# Patient Record
Sex: FEMALE | Race: WHITE | NOT HISPANIC OR LATINO | Employment: OTHER | URBAN - METROPOLITAN AREA
[De-identification: names, ages, dates, MRNs, and addresses within clinical notes are randomized per-mention and may not be internally consistent; named-entity substitution may affect disease eponyms.]

---

## 2017-01-24 ENCOUNTER — HOSPITAL ENCOUNTER (OUTPATIENT)
Dept: MAMMOGRAPHY | Facility: MEDICAL CENTER | Age: 52
Discharge: HOME | End: 2017-01-24
Payer: COMMERCIAL

## 2017-01-24 DIAGNOSIS — Z12.31 ENCOUNTER FOR SCREENING MAMMOGRAM FOR MALIGNANT NEOPLASM OF BREAST: ICD-10-CM

## 2017-01-24 PROCEDURE — 77063 BREAST TOMOSYNTHESIS BI: CPT

## 2017-01-24 PROCEDURE — G0202 SCR MAMMO BI INCL CAD: HCPCS

## 2017-01-25 ENCOUNTER — GENERIC CONVERSION - ENCOUNTER (OUTPATIENT)
Dept: OTHER | Facility: OTHER | Age: 52
End: 2017-01-25

## 2017-01-31 ENCOUNTER — GENERIC CONVERSION - ENCOUNTER (OUTPATIENT)
Dept: OTHER | Facility: OTHER | Age: 52
End: 2017-01-31

## 2017-03-09 ENCOUNTER — ALLSCRIPTS OFFICE VISIT (OUTPATIENT)
Dept: OTHER | Facility: OTHER | Age: 52
End: 2017-03-09

## 2017-04-12 ENCOUNTER — GENERIC CONVERSION - ENCOUNTER (OUTPATIENT)
Dept: OTHER | Facility: OTHER | Age: 52
End: 2017-04-12

## 2018-01-10 NOTE — PROGRESS NOTES
Assessment    1  Viral syndrome (079 99) (B34 9)    Plan  Viral syndrome    · Oseltamivir Phosphate 75 MG Oral Capsule (Oseltamivir Phosphate); TAKE 1  CAPSULE TWICE DAILY WITH MEALS    Discussion/Summary    Take meds as rxed, note for work  Chief Complaint    1  Cold Symptoms  PT C/O HEADACHE, FEVER, WEAKNESS, AND SORE THROAT  -sx started Monday- was sick, t max 102      Advance Directives  Advance Directive [facility]:   The patient is not in agreement to receive the Advance Care Planning service   Capacity/Competence: This patient has full decision making capacity for discussion of advance care planning and This patient has full decision making competency for discussion of advance care planning  Summary of Advance Directive Conversation  declines at this time  History of Present Illness  Cold Symptoms:   Kashif Tomlinson presents with complaints of sudden onset of constant episodes of moderate cold symptoms  Her symptoms are caused by home contact with URI  Symptoms are worsening  Review of Systems    Constitutional: fever, feeling poorly and chills  ENT: sore throat  Respiratory: cough  Gastrointestinal: no complaints of abdominal pain, no constipation, no nausea or diarrhea, no vomiting, no bloody stools  Neurological: headache  Active Problems    1  Abdominal pain (789 00) (R10 9)   2  Hashimoto's disease (245 2) (E06 3)   3  Visit for screening mammogram (V76 12) (Z12 31)    Past Medical History    1  History of hypothyroidism (V12 29) (Z86 39)  Active Problems And Past Medical History Reviewed: The active problems and past medical history were reviewed and updated today  Family History  Mother    1  Family history of kidney disease (V18 69) (Z84 1)  Father    2  Family history of cardiac disorder (V17 49) (Z82 49)   3  Family history of diabetes mellitus (V18 0) (Z83 3)   4  Family history of glaucoma (V19 11) (Z83 511)   5   Family history of hypothyroidism (V18 19) (Z83 49)  Sister    6  Family history of anemia (V18 2) (Z83 2)  Brother    7  Family history of hypothyroidism (V18 19) (Z83 49)  Family History Reviewed: The family history was reviewed and updated today  Social History    ·    · Never a smoker  The social history was reviewed and updated today  Surgical History    1  History of Oral Surgery Tooth Extraction  Surgical History Reviewed: The surgical history was reviewed and updated today  Current Meds   1  Levothroid 88 MCG TABS; Take 1 tablet daily Recorded    The medication list was reviewed and updated today  Allergies    1  Amoxicillin TABS   2  erythromycin    Vitals   Recorded: 02KQT0791 12:33PM   Temperature 98 5 F   Heart Rate 73   Systolic 188   Diastolic 78   Height 5 ft 6 in   Weight 176 lb 8 0 oz   BMI Calculated 28 49   BSA Calculated 1 9     Physical Exam    Constitutional   General appearance: Abnormal   acutely ill and overweight  Ears, Nose, Mouth, and Throat   Otoscopic examination: Tympanic membranes translucent with normal light reflex  Canals patent without erythema  Nasal mucosa, septum, and turbinates: Normal without edema or erythema  Oropharynx: Abnormal   There was erythema of both tonsils  Pulmonary   Auscultation of lungs: Clear to auscultation  Lymphatic   Palpation of lymph nodes in neck: No lymphadenopathy  Message  Return to work or school:   Mirtha Gallego is under my professional care  She was seen in my office on 3/9/17   She is able to return to work on  3/13/17      Oow 3/6-3/10 with leaving work early 3/6 and 3/9  Signatures   Electronically signed by :  Dilip Evans MD; Mar  9 2017 12:53PM EST                       (Author)

## 2018-01-10 NOTE — RESULT NOTES
Verified Results  MAMMO SCREENING BILATERAL W 3D & CAD [id number] 06:18PM [facility] Order Number: [id number]    - Patient Instructions: To schedule this appointment, please contact Central Scheduling at [phone]  Do not wear any perfume, powder, lotion or deodorant on breast or underarm area  Please bring your doctors order, referral (if needed) and insurance information with you on the day of the test  Failure to bring this information may result in this test being rescheduled  Arrive 15 minutes prior to your appointment time to register  On the day of your test, please bring any prior mammogram or breast studies with you that were not performed at a [facility]  Failure to bring prior exams may result in your test needing to be rescheduled  Test Name Result Flag Reference   MAMMO SCREENING BILATERAL W 3D & CAD (Report)     Patient History:   Patient is nulliparous  Family history of prostate cancer in paternal uncle at age 48 or    over  Patient has never smoked  Patient's BMI is 28 2  Reason for exam: screening (asymptomatic)  Mammo Screening Bilateral W DBT and CAD: January 24, 2017 - Check   In #: [de-identified]   2D/3D Procedure   3D views: Bilateral MLO view(s) were taken  2D views: Bilateral CC view(s) were taken  Technologist: DAVID Mendez (R)(M)   Prior study comparison: December 28, 2015, bilateral [facility] scr    bilat mammo w/DBT and CAD performed at [address] [place] [address]  December 19, 2014, bilateral digital    screening mammogram performed at [place] [address]  November 1, 2012, bilateral digital screening    mammogram performed at [place] [address]  September 29, 2011, bilateral digital screening    mammogram performed at [place] [address]  The breast tissue is heterogeneously dense, potentially limiting    the sensitivity of mammography  Patient risk, included in this    report, assists in determining the appropriate screening regimen    (such as 3-D mammography or the inclusion of automated breast    ultrasound or MRI)  3-D mammography may also remain indicated as    screening  No dominant soft tissue mass, architectural distortion or    suspicious calcifications are noted in either breast   The skin    and nipple structures are within normal limits  Scattered benign   appearing calcifications are noted  No significant changes when compared with prior studies  ASSESSMENT: BiRad:2 - Benign     Recommendation:   Routine screening mammogram of both breasts in 1 year  A    reminder letter will be scheduled  8-10% of cancers will be missed on mammography  Management of a    palpable abnormality must be based on clinical grounds  Patients    will be notified of their results via letter from our facility  Accredited by [employer] of Radiology and FDA       Transcription Location:  [address]orge [address]: [id number]     Risk Value(s):   Tyrer-Cuzick 10 Year: 3 731%, Tyrer-Cuzick Lifetime: 13 569%,    Myriad Table: 1 5%, ARTHUR 5 Year: 1 1%, NCI Lifetime: 8 6%

## 2018-01-10 NOTE — MISCELLANEOUS
Message  Return to work or school:   Zeina Segundo is under my professional care  She was seen in my office on 3/9/17   She is able to return to work on  3/13/17      Oow 3/6-3/10 with leaving work early 3/6 and 3/9  Advance Directives  Advance Directive [facility]:   The patient is not in agreement to receive the Advance Care Planning service   Capacity/Competence: This patient has full decision making capacity for discussion of advance care planning and This patient has full decision making competency for discussion of advance care planning  Summary of Advance Directive Conversation  declines at this time  Signatures   Electronically signed by :  Roly Ludwig MD; Mar  9 2017 12:53PM EST                       (Author)

## 2018-01-13 VITALS
BODY MASS INDEX: 28.37 KG/M2 | TEMPERATURE: 98.5 F | HEIGHT: 66 IN | SYSTOLIC BLOOD PRESSURE: 118 MMHG | WEIGHT: 176.5 LBS | DIASTOLIC BLOOD PRESSURE: 78 MMHG | HEART RATE: 73 BPM

## 2018-01-15 ENCOUNTER — TRANSCRIBE ORDERS (OUTPATIENT)
Dept: ADMINISTRATIVE | Facility: HOSPITAL | Age: 53
End: 2018-01-15

## 2018-01-15 DIAGNOSIS — Z12.31 VISIT FOR SCREENING MAMMOGRAM: Primary | ICD-10-CM

## 2018-01-16 DIAGNOSIS — Z12.31 ENCOUNTER FOR SCREENING MAMMOGRAM FOR MALIGNANT NEOPLASM OF BREAST: ICD-10-CM

## 2018-02-06 ENCOUNTER — HOSPITAL ENCOUNTER (OUTPATIENT)
Dept: MAMMOGRAPHY | Facility: MEDICAL CENTER | Age: 53
Discharge: HOME | End: 2018-02-06
Payer: COMMERCIAL

## 2018-02-06 DIAGNOSIS — Z12.31 ENCOUNTER FOR SCREENING MAMMOGRAM FOR MALIGNANT NEOPLASM OF BREAST: ICD-10-CM

## 2018-02-06 PROCEDURE — 77067 SCR MAMMO BI INCL CAD: CPT

## 2018-02-06 PROCEDURE — 77063 BREAST TOMOSYNTHESIS BI: CPT

## 2018-02-07 ENCOUNTER — TELEPHONE (OUTPATIENT)
Dept: FAMILY MEDICINE CLINIC | Facility: CLINIC | Age: 53
End: 2018-02-07

## 2018-02-27 ENCOUNTER — HOSPITAL ENCOUNTER (OUTPATIENT)
Dept: MAMMOGRAPHY | Facility: CLINIC | Age: 53
Discharge: HOME | End: 2018-02-27
Payer: COMMERCIAL

## 2018-02-27 DIAGNOSIS — R92.8 ABNORMAL SCREENING MAMMOGRAM: ICD-10-CM

## 2018-02-27 PROCEDURE — 77065 DX MAMMO INCL CAD UNI: CPT

## 2018-02-27 RX ORDER — MULTIVITAMIN
1 TABLET ORAL DAILY
COMMUNITY

## 2018-02-27 RX ORDER — LEVOTHYROXINE SODIUM 88 UG/1
1 TABLET ORAL DAILY
COMMUNITY

## 2018-02-27 NOTE — PROGRESS NOTES
Met with patient and Dr Petey Soni regarding recommendation for;      __x___ RIGHT ______LEFT      _____Ultrasound guided  ___x___Stereotactic  Breast biopsy  _____Verbalized understanding        Blood thinners:  _____yes __x___no    Date stopped: _____n/a______    Biopsy teaching sheet given:  ____x___yes ______no

## 2018-02-28 ENCOUNTER — TELEPHONE (OUTPATIENT)
Dept: FAMILY MEDICINE CLINIC | Facility: CLINIC | Age: 53
End: 2018-02-28

## 2018-02-28 NOTE — TELEPHONE ENCOUNTER
----- Message from Valery Peralta MD sent at 2/27/2018  5:48 PM EST -----  Based on mammo they are recommending further testing-is pt scheduled for testing they recommended and is she okay with going for further testing?

## 2018-03-01 ENCOUNTER — HOSPITAL ENCOUNTER (OUTPATIENT)
Dept: MAMMOGRAPHY | Facility: CLINIC | Age: 53
Discharge: HOME | End: 2018-03-01
Payer: COMMERCIAL

## 2018-03-01 VITALS — HEART RATE: 88 BPM | DIASTOLIC BLOOD PRESSURE: 82 MMHG | SYSTOLIC BLOOD PRESSURE: 108 MMHG

## 2018-03-01 DIAGNOSIS — R92.0 MAMMOGRAPHIC MICROCALCIFICATION: ICD-10-CM

## 2018-03-01 DIAGNOSIS — R92.8 OTHER ABNORMAL AND INCONCLUSIVE FINDINGS ON DIAGNOSTIC IMAGING OF BREAST: ICD-10-CM

## 2018-03-01 PROCEDURE — 88305 TISSUE EXAM BY PATHOLOGIST: CPT | Performed by: PATHOLOGY

## 2018-03-01 PROCEDURE — 88342 IMHCHEM/IMCYTCHM 1ST ANTB: CPT | Performed by: RADIOLOGY

## 2018-03-01 PROCEDURE — 88361 TUMOR IMMUNOHISTOCHEM/COMPUT: CPT | Performed by: PATHOLOGY

## 2018-03-01 PROCEDURE — 88361 TUMOR IMMUNOHISTOCHEM/COMPUT: CPT | Performed by: RADIOLOGY

## 2018-03-01 PROCEDURE — 88341 IMHCHEM/IMCYTCHM EA ADD ANTB: CPT | Performed by: RADIOLOGY

## 2018-03-01 PROCEDURE — 88342 IMHCHEM/IMCYTCHM 1ST ANTB: CPT | Performed by: PATHOLOGY

## 2018-03-01 PROCEDURE — 88305 TISSUE EXAM BY PATHOLOGIST: CPT | Performed by: RADIOLOGY

## 2018-03-01 PROCEDURE — 19081 BX BREAST 1ST LESION STRTCTC: CPT

## 2018-03-01 PROCEDURE — 88341 IMHCHEM/IMCYTCHM EA ADD ANTB: CPT | Performed by: PATHOLOGY

## 2018-03-01 RX ORDER — SODIUM BICARBONATE 42 MG/ML
1 INJECTION, SOLUTION INTRAVENOUS ONCE
Status: COMPLETED | OUTPATIENT
Start: 2018-03-01 | End: 2018-03-01

## 2018-03-01 RX ORDER — LIDOCAINE HYDROCHLORIDE 10 MG/ML
4 INJECTION, SOLUTION INFILTRATION; PERINEURAL ONCE
Status: COMPLETED | OUTPATIENT
Start: 2018-03-01 | End: 2018-03-01

## 2018-03-01 RX ORDER — LIDOCAINE HYDROCHLORIDE AND EPINEPHRINE BITARTRATE 20; .01 MG/ML; MG/ML
9 INJECTION, SOLUTION SUBCUTANEOUS ONCE
Status: COMPLETED | OUTPATIENT
Start: 2018-03-01 | End: 2018-03-01

## 2018-03-01 RX ADMIN — LIDOCAINE HYDROCHLORIDE AND EPINEPHRINE 9 ML: 20; 10 INJECTION, SOLUTION INFILTRATION; PERINEURAL at 12:44

## 2018-03-01 RX ADMIN — LIDOCAINE HYDROCHLORIDE 4 ML: 10 INJECTION, SOLUTION INFILTRATION; PERINEURAL at 12:44

## 2018-03-01 RX ADMIN — SODIUM BICARBONATE 0.5 MEQ: 42 SOLUTION INTRAVENOUS at 12:44

## 2018-03-01 NOTE — PROGRESS NOTES
Procedure type:    _____ultrasound guided __X___stereotactic    Breast:    _____Left __X___Right    Location:     Needle: 9G Standard Eviva    # of passes: A - 4 cores with calcs            B - remaining no calcs    Clip: S mey [place] 2S 13-Tophat    Performed by: Dr Birgit Quiroz held for 5 minutes by: Keshia Hess RN    Stergregory Strips:    __X___yes _____no    Alexandre Ryan aid:    __X___yes_____no    Tape and guaze:    __X___yes _____no    Tolerated procedure:    __X___yes _____no

## 2018-03-01 NOTE — PROGRESS NOTES
Ice pack given:    __X___yes _____no    Discharge instructions signed by patient:    ___X__yes _____no    Discharge instructions given to patient:    ___X__yes _____no    Discharged via:    __X___amulatory    _____wheelchair    _____stretcher    Stable on discharge:    __X___yes ____no

## 2018-03-01 NOTE — DISCHARGE INSTR - OTHER ORDERS
POST LARGE CORE BREAST BIOPSY PATIENT INFORMATION      1  Place an ice pack inside your bra over the top of the dressing every hour for 20 minutes (20 minutes on, 60 minutes off) Do this until bedtime  2  Do not shower or bathe until the following morning       3  You may bathe your breast carefully with the steri-strips in place  Be careful    Not to loosen them  The steri-strips will fall off in 3-5 days  4  You may have mild discomfort, and you may have some bruising where the   Needle entered the skin  This should clear within 5-7 days  5  If you need medicine for discomfort, take acetaminophen products such as   Tylenol  You may also take Advil or Motrin products  6  Do not participate in strenuous activities such as-tennis, aerobics, skiing,  Weight lifting, etc  for 24 hours  Refrain from swimming for 72 hours  7  Wearing a bra for sleeping may be more comfortable for the first 24-48 hours  8  Watch for continued bleeding, pain or fever over 101     For procedures done at the [address] [place] [address] call:  Shani Bland RN at [phone] or  Eren Santillan RN at [phone]  After 4 PM call the Interventional Radiology Department at [phone] and ask to speak with the nurse on call  For procedures performed at [address] [place] [address] call: The Radiology Nurse at [phone]    After 4 PM call your physician, or go to the Emergency Department  9          The final results of your biopsy are usually available within one week

## 2018-03-02 NOTE — PROGRESS NOTES
Post procedure call completed on 3/2/18 at 1125    Bleeding: _____yes __x___no    Pain: _____yes ___x___no    Redness/Swelling: ______yes __x____no    Band aid removed: ___x__yes _____no    Steri-Strips intact: ____x__yes _____no

## 2018-03-05 ENCOUNTER — TELEPHONE (OUTPATIENT)
Dept: FAMILY MEDICINE CLINIC | Facility: CLINIC | Age: 53
End: 2018-03-05

## 2018-03-05 DIAGNOSIS — D05.11 DUCTAL CARCINOMA IN SITU (DCIS) OF RIGHT BREAST: Primary | ICD-10-CM

## 2018-03-06 NOTE — PROGRESS NOTES
Patient Past Medical History:  Thyroid dysfunction          Allergies:Bactrim, Amoxicillin, E-Mycin, Clindamycin        Past Breast History:     Previous Biopsy:   Yes______ No____x____      Breast: Right____ Left______       Malignant______ Benign_______      Treatments if applicable        Present Breast History:     Right_____x_____    Left_____________     Density_________    Calcifications______x______   Palpable______________      Patient notified of results on:  3/5/18    Date of Appointment with Surgeon Dr Kimberly Loving, appt   3/12/18

## 2018-03-08 ENCOUNTER — TELEPHONE (OUTPATIENT)
Dept: FAMILY MEDICINE CLINIC | Facility: CLINIC | Age: 53
End: 2018-03-08

## 2018-03-08 NOTE — TELEPHONE ENCOUNTER
Patient left voice mail requesting her biopsy results shea had done on  3/1/2018  Patient also requested her Pathology report to be mailed to her house  Report will be mailed out tomorrow

## 2018-03-08 NOTE — TELEPHONE ENCOUNTER
I spoke with pt yesterday-did she have more questions after that, please mail results to her so she has for appt with surgeon Dr Bryson Serra next week

## 2018-03-14 ENCOUNTER — DOCUMENTATION (OUTPATIENT)
Dept: HEMATOLOGY ONCOLOGY | Facility: CLINIC | Age: 53
End: 2018-03-14

## 2018-03-14 ENCOUNTER — OFFICE VISIT (OUTPATIENT)
Dept: SURGICAL ONCOLOGY | Facility: CLINIC | Age: 53
End: 2018-03-14
Payer: COMMERCIAL

## 2018-03-14 VITALS
DIASTOLIC BLOOD PRESSURE: 82 MMHG | BODY MASS INDEX: 28.38 KG/M2 | HEIGHT: 66 IN | SYSTOLIC BLOOD PRESSURE: 134 MMHG | RESPIRATION RATE: 16 BRPM | WEIGHT: 176.6 LBS | HEART RATE: 72 BPM

## 2018-03-14 DIAGNOSIS — R92.2 DENSE BREAST TISSUE: ICD-10-CM

## 2018-03-14 DIAGNOSIS — D05.11 DUCTAL CARCINOMA IN SITU (DCIS) OF RIGHT BREAST WITH MICROINVASIVE COMPONENT: Primary | ICD-10-CM

## 2018-03-14 PROBLEM — C50.919: Status: ACTIVE | Noted: 2018-03-14

## 2018-03-14 PROBLEM — R92.30 DENSE BREAST TISSUE: Status: ACTIVE | Noted: 2018-03-14

## 2018-03-14 PROBLEM — D05.10: Status: ACTIVE | Noted: 2018-03-14

## 2018-03-14 PROCEDURE — 99244 OFF/OP CNSLTJ NEW/EST MOD 40: CPT | Performed by: SURGERY

## 2018-03-14 NOTE — PROGRESS NOTES
Oncology Navigator Visit  Assessment:  Called and left voice message on pt's cell phone for purpose of initial RN navigation outreach  Left contact info and instructions to call office at best convenience  Will outreach PRN        Potential Barriers:    Care Coordination:      Communication/Education:    Cultural/Faith/Spiritual:    Health Promotion:    Insurance/Medical Costs:    Logistical:    Psychosocial/Distress/Behavioral:    Work/School:    Interventions:    Referrals:        Comments:

## 2018-03-14 NOTE — LETTER
2018     MD [address]  [id number]    Patient: Ana Archuleta   YOB: 1965   Date of Visit: 3/14/2018       Dear Dr Gerald Townsend:    Thank you for referring Erika Flynn to me for evaluation  Below are my notes for this consultation  If you have questions, please do not hesitate to call me  I look forward to following your patient along with you  Sincerely,        Geneva Schneider MD        CC: No Recipients  Geneva Schneider MD  3/14/2018 11:32 AM  Sign at close encounter    Breast Consultation-Surgical Oncology     [place] [address]    Name:  Ana Archuleta  YOB: 1965  MRN:  [id number]    Assessment/Plan   Diagnoses and all orders for this visit:    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  -     MRI breast bilateral w or wo contrast; Future    Dense breast tissue  -     MRI breast bilateral w or wo contrast; Future        Assessment:  Right breast carcinoma, microinvasion in a background of extensive ductal carcinoma in situ; dense breast tissue and additional calcifications in the right breast    Plan:  MRI of the bilateral breast to define extent of disease, surgical recommendations will be made following this    HPI: Ana Archuleta is a 45y o  year old female who presents with a newly diagnosed right breast cancer  Pt denies any breast lumps, nipple drainage or skin changes  Right breast microcalcifications seen on breast imaging  She had right stereotactic breast biopsy done on 18 which revealed DCIS with microinvasion  Pt's biopsy site is dry, intact and healing  Surgical treatment to date consisted of n/a              Pertinent reproductive history:  Age at menarche:  13  OB History      Para Term  AB Living    0 0 0 0 0 0    SAB TAB Ectopic Multiple Live Births    0 0 0 0 0        Obstetric Comments    Age at menarche 13  Hx use of BCP X 2 years        Age at first live birth:  Never pregnant  Age at menopause:  unk  Hysterectomy/Oophrectomy:  No  Hormone replacement therapy:  No  Birth control pills:  Yes    Problem List:   Patient Active Problem List   Diagnosis    Ductal carcinoma in situ (DCIS) of breast with microinvasive component    Dense breast tissue     Past Medical History:   Diagnosis Date    Bilateral breast cysts     Breast tenderness     Frequent urination     Night sweats      Past Surgical History:   Procedure Laterality Date    BREAST BIOPSY Right 03 01/18    DCIS    THYROID SURGERY       Family History   Problem Relation Age of Onset    Prostate cancer Paternal Uncle 64     Social History     Social History    Marital status: /Civil Union     Spouse name: N/A    Number of children: N/A    Years of education: N/A     Occupational History    Not on file  Social History Main Topics    Smoking status: Never Smoker    Smokeless tobacco: Never Used    Alcohol use Yes      Comment: 8 drinks per week    Drug use: Unknown    Sexual activity: Not on file     Other Topics Concern    Not on file     Social History Narrative    No narrative on file     Current Outpatient Prescriptions   Medication Sig Dispense Refill    levothyroxine 88 mcg tablet Take 1 tablet by mouth daily      Multiple Vitamin (MULTIVITAMIN) tablet Take 1 tablet by mouth daily       No current facility-administered medications for this visit        Allergies   Allergen Reactions    Clindamycin Other (See Comments)     Was painful to walk     Erythromycin Hives    Sulfamethoxazole-Trimethoprim     Amoxicillin Rash         The following portions of the patient's history were reviewed and updated as appropriate: allergies, current medications, past family history, past medical history, past social history, past surgical history and problem list     Review of Systems:  Review of Systems Constitutional: Negative for appetite change and fever  Hot flashes   HENT: Positive for tinnitus  Eyes: Negative  Respiratory: Negative for shortness of breath  Cardiovascular: Negative  Gastrointestinal: Negative  Endocrine: Negative  Genitourinary: Positive for frequency  Musculoskeletal: Negative  Negative for arthralgias and myalgias  Skin: Negative  Allergic/Immunologic: Negative  Neurological: Negative  Hematological: Negative  Negative for adenopathy  Does not bruise/bleed easily  Psychiatric/Behavioral: Negative  Physical Exam:  Physical Exam   Constitutional: She is oriented to person, place, and time  She appears well-developed and well-nourished  HENT:   Head: Normocephalic and atraumatic  Cardiovascular: Normal heart sounds  Pulmonary/Chest: Breath sounds normal  Right breast exhibits skin change (well healed biopsy site upper outer right breast)  Right breast exhibits no inverted nipple, no mass, no nipple discharge and no tenderness  Left breast exhibits no inverted nipple, no mass, no nipple discharge, no skin change and no tenderness  Abdominal: Soft  Lymphadenopathy:        Right axillary: No pectoral and no lateral adenopathy present  Left axillary: No pectoral and no lateral adenopathy present  Right: No supraclavicular adenopathy present  Left: No supraclavicular adenopathy present  Neurological: She is alert and oriented to person, place, and time  Psychiatric: She has a normal mood and affect  Laboratory:  2018 stereotactic biopsy of the right breast microinvasive carcinoma in a background of extensive ductal carcinoma in situ    Tumor node status:  Clinically node-negative     Hormone receptor status:  estrogen receptor negative, ? On invasive component vs DCIS      Imagin18  3D Bilateral screening mammogram,  B0 (3)   Right breast calcifications; left benign  18 right dx mammogram, B4 (3)  Suspicious calcs for which biopsy was recommended  03/01/18 right stereotactic breast biopsy as noted        Treatment options include:  Lumpectomy vs mastectomy    Discussion/Summary:  61-year-old female with newly diagnosed carcinoma of the right breast   This is a micro invasive carcinoma in in a background of extensive ductal carcinoma in situ  She does have additional calcifications in the right breast that have remained stable  She also has dense glandular tissue  For this reason, a breast MRI has been recommended by the radiologist and I concur with this recommendation  I did discuss with her the multimodality treatment of breast cancer to include surgery, radiation and medical therapy  If there are no additional areas of concern seen on the breast MRI, then she would be a good candidate for breast conservation  If any of the other calcifications would be of concern, then she would need an additional biopsy  Irregardless of the type of surgery, she will need a sentinel node biopsy  She understands if she has breast conservation that she will need radiation therapy as well  Regarding the medical therapy, I discussed her receptor status with her  One of the questions I have is whether or not this was done on the invasive component versus the in situ component  The invasive component is noted to be 0 8 mm on the current biopsy report  Even if she is indeed ER WV negative and her2 positive on the invasive component, with a tumor this small she would not likely benefit from any adjuvant medical therapy  I discussed the possibility of repeating this on her final surgical pathology  All of her questions were answered today  I will make arrangements for her breast MRI  I will call her following this with the results and to make any further recommendations

## 2018-03-14 NOTE — PROGRESS NOTES
Breast Consultation-Surgical Oncology     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  -     MRI breast bilateral w or wo contrast; Future    Dense breast tissue  -     MRI breast bilateral w or wo contrast; Future        Assessment:  Right breast carcinoma, microinvasion in a background of extensive ductal carcinoma in situ; dense breast tissue and additional calcifications in the right breast    Plan:  MRI of the bilateral breast to define extent of disease, surgical recommendations will be made following this    HPI: Александр Ash is a 45y o  year old female who presents with a newly diagnosed right breast cancer  Pt denies any breast lumps, nipple drainage or skin changes  Right breast microcalcifications seen on breast imaging  She had right stereotactic breast biopsy done on 18 which revealed DCIS with microinvasion  Pt's biopsy site is dry, intact and healing  Surgical treatment to date consisted of n/a  Pertinent reproductive history:  Age at menarche:  13  OB History      Para Term  AB Living    0 0 0 0 0 0    SAB TAB Ectopic Multiple Live Births    0 0 0 0 0        Obstetric Comments    Age at menarche 13   Hx use of BCP X 2 years        Age at first live birth:  Never pregnant  Age at menopause:  unk  Hysterectomy/Oophrectomy:  No  Hormone replacement therapy:  No  Birth control pills:  Yes    Problem List:   Patient Active Problem List   Diagnosis    Ductal carcinoma in situ (DCIS) of breast with microinvasive component    Dense breast tissue     Past Medical History:   Diagnosis Date    Bilateral breast cysts     Breast tenderness     Frequent urination     Night sweats      Past Surgical History:   Procedure Laterality Date    BREAST BIOPSY Right 03 01/18    DCIS    THYROID SURGERY       Family History   Problem Relation Age of Onset    Prostate cancer Paternal Uncle 64     Social History     Social History    Marital status: /Civil Union     Spouse name: N/A    Number of children: N/A    Years of education: N/A     Occupational History    Not on file  Social History Main Topics    Smoking status: Never Smoker    Smokeless tobacco: Never Used    Alcohol use Yes      Comment: 8 drinks per week    Drug use: Unknown    Sexual activity: Not on file     Other Topics Concern    Not on file     Social History Narrative    No narrative on file     Current Outpatient Prescriptions   Medication Sig Dispense Refill    levothyroxine 88 mcg tablet Take 1 tablet by mouth daily      Multiple Vitamin (MULTIVITAMIN) tablet Take 1 tablet by mouth daily       No current facility-administered medications for this visit  Allergies   Allergen Reactions    Clindamycin Other (See Comments)     Was painful to walk     Erythromycin Hives    Sulfamethoxazole-Trimethoprim     Amoxicillin Rash         The following portions of the patient's history were reviewed and updated as appropriate: allergies, current medications, past family history, past medical history, past social history, past surgical history and problem list     Review of Systems:  Review of Systems   Constitutional: Negative for appetite change and fever  Hot flashes   HENT: Positive for tinnitus  Eyes: Negative  Respiratory: Negative for shortness of breath  Cardiovascular: Negative  Gastrointestinal: Negative  Endocrine: Negative  Genitourinary: Positive for frequency  Musculoskeletal: Negative  Negative for arthralgias and myalgias  Skin: Negative  Allergic/Immunologic: Negative  Neurological: Negative  Hematological: Negative  Negative for adenopathy  Does not bruise/bleed easily  Psychiatric/Behavioral: Negative  Physical Exam:  Physical Exam   Constitutional: She is oriented to person, place, and time  She appears well-developed and well-nourished  HENT:   Head: Normocephalic and atraumatic  Cardiovascular: Normal heart sounds  Pulmonary/Chest: Breath sounds normal  Right breast exhibits skin change (well healed biopsy site upper outer right breast)  Right breast exhibits no inverted nipple, no mass, no nipple discharge and no tenderness  Left breast exhibits no inverted nipple, no mass, no nipple discharge, no skin change and no tenderness  Abdominal: Soft  Lymphadenopathy:        Right axillary: No pectoral and no lateral adenopathy present  Left axillary: No pectoral and no lateral adenopathy present  Right: No supraclavicular adenopathy present  Left: No supraclavicular adenopathy present  Neurological: She is alert and oriented to person, place, and time  Psychiatric: She has a normal mood and affect  Laboratory:  2018 stereotactic biopsy of the right breast microinvasive carcinoma in a background of extensive ductal carcinoma in situ    Tumor node status:  Clinically node-negative     Hormone receptor status:  estrogen receptor negative, ? On invasive component vs DCIS      Imagin18  3D Bilateral screening mammogram,  B0 (3)  Right breast calcifications; left benign  18 right dx mammogram, B4 (3)  Suspicious calcs for which biopsy was recommended  18 right stereotactic breast biopsy as noted        Treatment options include:  Lumpectomy vs mastectomy    Discussion/Summary:  77-year-old female with newly diagnosed carcinoma of the right breast   This is a micro invasive carcinoma in in a background of extensive ductal carcinoma in situ  She does have additional calcifications in the right breast that have remained stable  She also has dense glandular tissue   For this reason, a breast MRI has been recommended by the radiologist and I concur with this recommendation  I did discuss with her the multimodality treatment of breast cancer to include surgery, radiation and medical therapy  If there are no additional areas of concern seen on the breast MRI, then she would be a good candidate for breast conservation  If any of the other calcifications would be of concern, then she would need an additional biopsy  Irregardless of the type of surgery, she will need a sentinel node biopsy  She understands if she has breast conservation that she will need radiation therapy as well  Regarding the medical therapy, I discussed her receptor status with her  One of the questions I have is whether or not this was done on the invasive component versus the in situ component  The invasive component is noted to be 0 8 mm on the current biopsy report  Even if she is indeed ER CT negative and her2 positive on the invasive component, with a tumor this small she would not likely benefit from any adjuvant medical therapy  I discussed the possibility of repeating this on her final surgical pathology  All of her questions were answered today  I will make arrangements for her breast MRI  I will call her following this with the results and to make any further recommendations

## 2018-03-20 ENCOUNTER — HOSPITAL ENCOUNTER (OUTPATIENT)
Dept: RADIOLOGY | Facility: HOSPITAL | Age: 53
Discharge: HOME | End: 2018-03-20
Attending: SURGERY
Payer: COMMERCIAL

## 2018-03-20 DIAGNOSIS — R92.2 DENSE BREAST TISSUE: ICD-10-CM

## 2018-03-20 DIAGNOSIS — D05.11 DUCTAL CARCINOMA IN SITU (DCIS) OF RIGHT BREAST WITH MICROINVASIVE COMPONENT: ICD-10-CM

## 2018-03-20 PROCEDURE — C8908 MRI W/O FOL W/CONT, BREAST,: HCPCS

## 2018-03-20 PROCEDURE — A9585 GADOBUTROL INJECTION: HCPCS | Performed by: SURGERY

## 2018-03-20 RX ADMIN — GADOBUTROL 6 ML: 604.72 INJECTION INTRAVENOUS at 13:20

## 2018-03-21 DIAGNOSIS — R92.8 ABNORMAL MRI, BREAST: Primary | ICD-10-CM

## 2018-03-23 ENCOUNTER — TELEPHONE (OUTPATIENT)
Dept: SURGICAL ONCOLOGY | Facility: CLINIC | Age: 53
End: 2018-03-23

## 2018-03-23 NOTE — TELEPHONE ENCOUNTER
As per Dr Maegan Sanchez spoke with pt and informed her of breast MRI results and the recommendation for a left breast second look Us  Pt verbalized understanding and is scheduled for Tuesday, 03/27/18  Will proceed with biopsy if warranted  Pt informed of refraining from blood thinning medications

## 2018-03-27 ENCOUNTER — HOSPITAL ENCOUNTER (OUTPATIENT)
Dept: ULTRASOUND IMAGING | Facility: CLINIC | Age: 53
Discharge: HOME | End: 2018-03-27
Payer: COMMERCIAL

## 2018-03-27 ENCOUNTER — HOSPITAL ENCOUNTER (OUTPATIENT)
Dept: ULTRASOUND IMAGING | Facility: CLINIC | Age: 53
Discharge: HOME | End: 2018-03-27

## 2018-03-27 DIAGNOSIS — R92.8 ABNORMAL MRI, BREAST: ICD-10-CM

## 2018-03-27 DIAGNOSIS — R93.89 ABNORMAL MRI: Primary | ICD-10-CM

## 2018-03-27 PROCEDURE — 76642 ULTRASOUND BREAST LIMITED: CPT

## 2018-03-27 RX ORDER — LIDOCAINE HYDROCHLORIDE 10 MG/ML
4 INJECTION, SOLUTION INFILTRATION; PERINEURAL ONCE
Status: DISCONTINUED | OUTPATIENT
Start: 2018-03-27 | End: 2018-03-28 | Stop reason: HOSPADM

## 2018-03-27 RX ORDER — SODIUM BICARBONATE 42 MG/ML
1 INJECTION, SOLUTION INTRAVENOUS ONCE
Status: DISCONTINUED | OUTPATIENT
Start: 2018-03-27 | End: 2018-03-28 | Stop reason: HOSPADM

## 2018-04-04 ENCOUNTER — OFFICE VISIT (OUTPATIENT)
Dept: SURGICAL ONCOLOGY | Facility: CLINIC | Age: 53
End: 2018-04-04
Payer: COMMERCIAL

## 2018-04-04 VITALS
HEIGHT: 66 IN | WEIGHT: 175 LBS | TEMPERATURE: 98.2 F | SYSTOLIC BLOOD PRESSURE: 90 MMHG | BODY MASS INDEX: 28.12 KG/M2 | HEART RATE: 72 BPM | DIASTOLIC BLOOD PRESSURE: 80 MMHG | RESPIRATION RATE: 16 BRPM

## 2018-04-04 DIAGNOSIS — D05.11 DUCTAL CARCINOMA IN SITU (DCIS) OF RIGHT BREAST WITH MICROINVASIVE COMPONENT: Primary | ICD-10-CM

## 2018-04-04 PROBLEM — C50.911 DUCTAL CARCINOMA IN SITU (DCIS) OF RIGHT BREAST WITH MICROINVASIVE COMPONENT (HCC): Status: ACTIVE | Noted: 2018-04-04

## 2018-04-04 PROCEDURE — 99215 OFFICE O/P EST HI 40 MIN: CPT | Performed by: SURGERY

## 2018-04-04 RX ORDER — TRAMADOL HYDROCHLORIDE 50 MG/1
50 TABLET ORAL EVERY 6 HOURS PRN
Qty: 20 TABLET | Refills: 0 | Status: SHIPPED | OUTPATIENT
Start: 2018-04-04 | End: 2018-08-15

## 2018-04-04 NOTE — PROGRESS NOTES
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  -     traMADol (ULTRAM) 50 mg tablet; Take 1 tablet (50 mg total) by mouth every 6 (six) hours as needed for moderate pain  -     Mammo needle localization right (all inc); Future  -     Mammo needle localization right (all inc) each add; Future  -     NM lymphatic breast; Future        Advance Care Planning/Advance Directives:  Did not discuss  with the patient  Oncology History:     No history exists  History of Present Illness: multifocal dcis right breast with microinvasion  -Interval History:none    Review of Systems:  Review of Systems   Constitutional: Negative  Negative for appetite change and fever  Eyes: Negative  Respiratory: Negative for shortness of breath  Cardiovascular: Negative  Gastrointestinal: Negative  Endocrine: Negative  Genitourinary: Negative  Musculoskeletal: Negative  Negative for arthralgias and myalgias  Skin: Negative  Allergic/Immunologic: Negative  Neurological: Negative  Hematological: Negative  Negative for adenopathy  Does not bruise/bleed easily  Psychiatric/Behavioral: Negative          Patient Active Problem List   Diagnosis    Ductal carcinoma in situ (DCIS) of breast with microinvasive component    Dense breast tissue     Past Medical History:   Diagnosis Date    Bilateral breast cysts     Breast tenderness     Frequent urination     Night sweats      Past Surgical History:   Procedure Laterality Date  BREAST BIOPSY Right 03 01/18    DCIS    THYROID SURGERY       Family History   Problem Relation Age of Onset    Prostate cancer Paternal Uncle 64     Social History     Social History    Marital status: /Civil Union     Spouse name: N/A    Number of children: N/A    Years of education: N/A     Occupational History    Not on file  Social History Main Topics    Smoking status: Never Smoker    Smokeless tobacco: Never Used    Alcohol use Yes      Comment: 8 drinks per week    Drug use: Unknown    Sexual activity: Not on file     Other Topics Concern    Not on file     Social History Narrative    No narrative on file       Current Outpatient Prescriptions:     levothyroxine 88 mcg tablet, Take 1 tablet by mouth daily, Disp: , Rfl:     Multiple Vitamin (MULTIVITAMIN) tablet, Take 1 tablet by mouth daily, Disp: , Rfl:     traMADol (ULTRAM) 50 mg tablet, Take 1 tablet (50 mg total) by mouth every 6 (six) hours as needed for moderate pain, Disp: 20 tablet, Rfl: 0  Allergies   Allergen Reactions    Clindamycin Other (See Comments)     Was painful to walk     Erythromycin Hives    Sulfamethoxazole-Trimethoprim     Amoxicillin Rash       The following portions of the patient's history were reviewed and updated as appropriate: allergies, current medications, past family history, past medical history, past social history, past surgical history and problem list         Vitals:    04/04/18 0820   BP: 90/80   Pulse: 72   Resp: 16   Temp: 98 2 °F (36 8 °C)       Physical Exam   Constitutional: She is oriented to person, place, and time  She appears well-developed and well-nourished  Cardiovascular: Normal heart sounds  Pulmonary/Chest: Breath sounds normal    Neurological: She is alert and oriented to person, place, and time  Psychiatric: She has a normal mood and affect           Results:      Imaging  3/20/18 bilateral breast MRI multifocal DCIS right breast, area of enhancement on the left for which a second-look ultrasound was recommended  03/27/2018 left breast ultrasound no correlate identified for the MRI finding therefore an MRI guided biopsy is recommended    I reviewed the above imaging data  Discussion/Summary:  57-year-old female with multifocal ductal carcinoma in situ of the right breast with an area of microinvasion  This appears to involve the entire radian of the breast   I discussed this with her and recommended either a bracketed lumpectomy versus a mastectomy  If she chooses the former, she will have asymmetry to the breast   She will also need radiation therapy  If the latter, she has the option of doing reconstruction at the same time of the mastectomy  She does prefers to save her breast   I therefore discussed a bracketed needle localized lumpectomy of the right breast along with sentinel node biopsy  As stated she will need radiation therapy  She will also meet with Medical Oncology in the postoperative setting to discuss any adjuvant therapy  If she has additional invasive foci, I will repeat her receptor status  She has an upcoming MRI guided biopsy of the contralateral breast scheduled for 4/16  I will plan for surgery on 04/24 to allow time for those results  If the MRI guided biopsy is benign, we will proceed as stated above with a needle localized bracketed right lumpectomy with lymphatic mapping and sentinel node biopsy  All of her questions were answered today and consent was signed in the office

## 2018-04-11 ENCOUNTER — ANESTHESIA EVENT (OUTPATIENT)
Dept: PERIOP | Facility: HOSPITAL | Age: 53
End: 2018-04-11
Payer: COMMERCIAL

## 2018-04-11 RX ORDER — SODIUM CHLORIDE 9 MG/ML
125 INJECTION, SOLUTION INTRAVENOUS CONTINUOUS
Status: CANCELLED | OUTPATIENT
Start: 2018-04-24

## 2018-04-12 ENCOUNTER — HOSPITAL ENCOUNTER (OUTPATIENT)
Dept: NON INVASIVE DIAGNOSTICS | Facility: HOSPITAL | Age: 53
Discharge: HOME | End: 2018-04-12
Attending: SURGERY
Payer: COMMERCIAL

## 2018-04-12 ENCOUNTER — HOSPITAL ENCOUNTER (OUTPATIENT)
Dept: RADIOLOGY | Facility: HOSPITAL | Age: 53
Discharge: HOME | End: 2018-04-12
Attending: SURGERY
Payer: COMMERCIAL

## 2018-04-12 ENCOUNTER — APPOINTMENT (OUTPATIENT)
Dept: LAB | Facility: HOSPITAL | Age: 53
End: 2018-04-12
Attending: SURGERY
Payer: COMMERCIAL

## 2018-04-12 ENCOUNTER — APPOINTMENT (OUTPATIENT)
Dept: PREADMISSION TESTING | Facility: HOSPITAL | Age: 53
End: 2018-04-12
Payer: COMMERCIAL

## 2018-04-12 DIAGNOSIS — D05.11 DUCTAL CARCINOMA IN SITU (DCIS) OF RIGHT BREAST WITH MICROINVASIVE COMPONENT: ICD-10-CM

## 2018-04-12 LAB
ALBUMIN SERPL BCP-MCNC: 3.7 G/DL (ref 3.5–5)
ALP SERPL-CCNC: 73 U/L (ref 46–116)
ALT SERPL W P-5'-P-CCNC: 38 U/L (ref 12–78)
ANION GAP SERPL CALCULATED.3IONS-SCNC: 8 MMOL/L (ref 4–13)
APTT PPP: 28 SECONDS (ref 23–35)
AST SERPL W P-5'-P-CCNC: 24 U/L (ref 5–45)
ATRIAL RATE: 72 BPM
BASOPHILS # BLD AUTO: 0.05 THOUSANDS/ΜL (ref 0–0.1)
BASOPHILS NFR BLD AUTO: 1 % (ref 0–1)
BILIRUB SERPL-MCNC: 0.41 MG/DL (ref 0.2–1)
BILIRUB UR QL STRIP: NEGATIVE
BUN SERPL-MCNC: 18 MG/DL (ref 5–25)
CALCIUM SERPL-MCNC: 9.4 MG/DL
CHLORIDE SERPL-SCNC: 104 MMOL/L (ref 100–108)
CLARITY UR: CLEAR
CO2 SERPL-SCNC: 28 MMOL/L (ref 21–32)
COLOR UR: YELLOW
CREAT SERPL-MCNC: 0.76 MG/DL (ref 0.6–1.3)
EOSINOPHIL # BLD AUTO: 0.17 THOUSAND/ΜL (ref 0–0.61)
EOSINOPHIL NFR BLD AUTO: 2 % (ref 0–6)
ERYTHROCYTE [DISTWIDTH] IN BLOOD BY AUTOMATED COUNT: 12.5 % (ref 11.6–15.1)
GFR SERPL CREATININE-BSD FRML MDRD: 89 ML/MIN/1.73SQ M
GLUCOSE SERPL-MCNC: 91 MG/DL (ref 65–140)
GLUCOSE UR STRIP-MCNC: NEGATIVE MG/DL
HCT VFR BLD AUTO: 41.7 % (ref 34.8–46.1)
HGB BLD-MCNC: 13.8 G/DL (ref 11.5–15.4)
HGB UR QL STRIP.AUTO: NEGATIVE
INR PPP: 0.96 (ref 0.86–1.16)
KETONES UR STRIP-MCNC: NEGATIVE MG/DL
LEUKOCYTE ESTERASE UR QL STRIP: NEGATIVE
LYMPHOCYTES # BLD AUTO: 2.18 THOUSANDS/ΜL (ref 0.6–4.47)
LYMPHOCYTES NFR BLD AUTO: 30 % (ref 14–44)
MCH RBC QN AUTO: 29.7 PG (ref 26.8–34.3)
MCHC RBC AUTO-ENTMCNC: 33.1 G/DL (ref 31.4–37.4)
MCV RBC AUTO: 90 FL (ref 82–98)
MONOCYTES # BLD AUTO: 0.56 THOUSAND/ΜL (ref 0.17–1.22)
MONOCYTES NFR BLD AUTO: 8 % (ref 4–12)
NEUTROPHILS # BLD AUTO: 4.38 THOUSANDS/ΜL (ref 1.85–7.62)
NEUTS SEG NFR BLD AUTO: 59 % (ref 43–75)
NITRITE UR QL STRIP: NEGATIVE
NRBC BLD AUTO-RTO: 0 /100 WBCS
P AXIS: 63 DEGREES
PH UR STRIP.AUTO: 5 [PH] (ref 4.5–8)
PLATELET # BLD AUTO: 230 THOUSANDS/UL (ref 149–390)
PMV BLD AUTO: 11 FL (ref 8.9–12.7)
POTASSIUM SERPL-SCNC: 4.3 MMOL/L (ref 3.5–5.3)
PR INTERVAL: 150 MS
PROT SERPL-MCNC: 7.7 G/DL (ref 6.4–8.2)
PROT UR STRIP-MCNC: NEGATIVE MG/DL
PROTHROMBIN TIME: 12.8 SECONDS (ref 12.1–14.4)
QRS AXIS: 45 DEGREES
QRSD INTERVAL: 80 MS
QT INTERVAL: 388 MS
QTC INTERVAL: 424 MS
RBC # BLD AUTO: 4.64 MILLION/UL (ref 3.81–5.12)
SODIUM SERPL-SCNC: 140 MMOL/L (ref 136–145)
SP GR UR STRIP.AUTO: 1.02 (ref 1–1.03)
T WAVE AXIS: 50 DEGREES
UROBILINOGEN UR QL STRIP.AUTO: 0.2 E.U./DL
VENTRICULAR RATE: 72 BPM
WBC # BLD AUTO: 7.34 THOUSAND/UL (ref 4.31–10.16)

## 2018-04-12 PROCEDURE — 93005 ELECTROCARDIOGRAM TRACING: CPT

## 2018-04-12 PROCEDURE — 71046 X-RAY EXAM CHEST 2 VIEWS: CPT

## 2018-04-12 PROCEDURE — 85610 PROTHROMBIN TIME: CPT

## 2018-04-12 PROCEDURE — 36415 COLL VENOUS BLD VENIPUNCTURE: CPT

## 2018-04-12 PROCEDURE — 85025 COMPLETE CBC W/AUTO DIFF WBC: CPT

## 2018-04-12 PROCEDURE — 93010 ELECTROCARDIOGRAM REPORT: CPT | Performed by: INTERNAL MEDICINE

## 2018-04-12 PROCEDURE — 81003 URINALYSIS AUTO W/O SCOPE: CPT | Performed by: SURGERY

## 2018-04-12 PROCEDURE — 80053 COMPREHEN METABOLIC PANEL: CPT

## 2018-04-12 PROCEDURE — 85730 THROMBOPLASTIN TIME PARTIAL: CPT

## 2018-04-12 NOTE — ANESTHESIA PREPROCEDURE EVALUATION
Review of Systems/Medical History  Patient summary reviewed        Cardiovascular  Negative cardio ROS    Pulmonary  Negative pulmonary ROS        GI/Hepatic  Negative GI/hepatic ROS          Negative  ROS        Endo/Other  History of thyroid disease , hypothyroidism,      GYN    Breast cancer (DCIS R breast)        Hematology  Negative hematology ROS      Musculoskeletal  Negative musculoskeletal ROS        Neurology  Negative neurology ROS      Psychology   Negative psychology ROS              Physical Exam    Airway    Mallampati score: II  TM Distance: >3 FB  Neck ROM: full     Dental   No notable dental hx     Cardiovascular  Comment: Negative ROS, Rhythm: regular, Rate: normal, Cardiovascular exam normal    Pulmonary  Pulmonary exam normal Breath sounds clear to auscultation,     Other Findings        Anesthesia Plan  ASA Score- 2     Anesthesia Type- general with ASA Monitors  Additional Monitors:   Airway Plan:         Plan Factors-Patient not instructed to abstain from smoking on day of procedure  Patient did not smoke on day of surgery  Induction- intravenous  Postoperative Plan- Plan for postoperative opioid use  Informed Consent- Anesthetic plan and risks discussed with patient

## 2018-04-12 NOTE — PRE-PROCEDURE INSTRUCTIONS
Pre-Surgery Instructions:   Medication Instructions    levothyroxine 88 mcg tablet Patient was instructed by Physician and understands   Multiple Vitamin (MULTIVITAMIN) tablet Patient was instructed by Physician and understands  Patient was seen by Dr Peter Hua and was instructed to take levothyroxine am of surgery with a sip of water  Patient was instructed to avoid NSAIDS, Aspirin, Vitamins, and supplements 7 days prior to surgery  [facility] pre-op instructions reviewed  Pre-op bathing reviewed with patient and was given chlorhexidine soap

## 2018-04-16 ENCOUNTER — HOSPITAL ENCOUNTER (OUTPATIENT)
Dept: RADIOLOGY | Facility: HOSPITAL | Age: 53
Discharge: HOME | End: 2018-04-16
Payer: COMMERCIAL

## 2018-04-16 ENCOUNTER — HOSPITAL ENCOUNTER (OUTPATIENT)
Dept: RADIOLOGY | Facility: HOSPITAL | Age: 53
Discharge: HOME | End: 2018-04-16
Attending: SURGERY
Payer: COMMERCIAL

## 2018-04-16 DIAGNOSIS — R93.89 ABNORMAL MRI: ICD-10-CM

## 2018-04-16 PROCEDURE — 19085 BX BREAST 1ST LESION MR IMAG: CPT

## 2018-04-16 PROCEDURE — 88305 TISSUE EXAM BY PATHOLOGIST: CPT | Performed by: PATHOLOGY

## 2018-04-16 PROCEDURE — A9585 GADOBUTROL INJECTION: HCPCS | Performed by: SURGERY

## 2018-04-16 RX ORDER — LIDOCAINE HYDROCHLORIDE AND EPINEPHRINE BITARTRATE 20; .01 MG/ML; MG/ML
18 INJECTION, SOLUTION SUBCUTANEOUS ONCE
Status: COMPLETED | OUTPATIENT
Start: 2018-04-16 | End: 2018-04-16

## 2018-04-16 RX ORDER — LIDOCAINE HYDROCHLORIDE 10 MG/ML
4.5 INJECTION, SOLUTION EPIDURAL; INFILTRATION; INTRACAUDAL; PERINEURAL ONCE
Status: COMPLETED | OUTPATIENT
Start: 2018-04-16 | End: 2018-04-16

## 2018-04-16 RX ADMIN — GADOBUTROL 9 ML: 604.72 INJECTION INTRAVENOUS at 09:05

## 2018-04-16 RX ADMIN — LIDOCAINE HYDROCHLORIDE 4.5 ML: 10 INJECTION, SOLUTION EPIDURAL; INFILTRATION; INTRACAUDAL; PERINEURAL at 09:17

## 2018-04-16 RX ADMIN — LIDOCAINE HYDROCHLORIDE,EPINEPHRINE BITARTRATE 18 ML: 20; .01 INJECTION, SOLUTION INFILTRATION; PERINEURAL at 09:17

## 2018-04-24 ENCOUNTER — HOSPITAL ENCOUNTER (OUTPATIENT)
Dept: MAMMOGRAPHY | Facility: HOSPITAL | Age: 53
Setting detail: OUTPATIENT SURGERY
Discharge: HOME | End: 2018-04-24
Payer: COMMERCIAL

## 2018-04-24 ENCOUNTER — CONVERSION ENCOUNTER (OUTPATIENT)
Dept: MAMMOGRAPHY | Facility: HOSPITAL | Age: 53
End: 2018-04-24

## 2018-04-24 ENCOUNTER — HOSPITAL ENCOUNTER (OUTPATIENT)
Facility: HOSPITAL | Age: 53
Setting detail: OUTPATIENT SURGERY
Discharge: HOME | End: 2018-04-24
Attending: SURGERY | Admitting: SURGERY
Payer: COMMERCIAL

## 2018-04-24 ENCOUNTER — HOSPITAL ENCOUNTER (OUTPATIENT)
Dept: MAMMOGRAPHY | Facility: HOSPITAL | Age: 53
Discharge: HOME | End: 2018-04-24
Attending: SURGERY
Payer: COMMERCIAL

## 2018-04-24 ENCOUNTER — HOSPITAL ENCOUNTER (OUTPATIENT)
Dept: NUCLEAR MEDICINE | Facility: HOSPITAL | Age: 53
Discharge: HOME | End: 2018-04-24
Attending: SURGERY
Payer: COMMERCIAL

## 2018-04-24 ENCOUNTER — ANESTHESIA (OUTPATIENT)
Dept: PERIOP | Facility: HOSPITAL | Age: 53
End: 2018-04-24
Payer: COMMERCIAL

## 2018-04-24 VITALS
TEMPERATURE: 98.4 F | HEART RATE: 62 BPM | DIASTOLIC BLOOD PRESSURE: 59 MMHG | WEIGHT: 170 LBS | BODY MASS INDEX: 27.32 KG/M2 | HEIGHT: 66 IN | OXYGEN SATURATION: 96 % | RESPIRATION RATE: 18 BRPM | SYSTOLIC BLOOD PRESSURE: 100 MMHG

## 2018-04-24 DIAGNOSIS — D05.11 DUCTAL CARCINOMA IN SITU (DCIS) OF RIGHT BREAST WITH MICROINVASIVE COMPONENT: ICD-10-CM

## 2018-04-24 PROCEDURE — 78195 LYMPH SYSTEM IMAGING: CPT

## 2018-04-24 PROCEDURE — 88367 INSITU HYBRIDIZATION AUTO: CPT | Performed by: PATHOLOGY

## 2018-04-24 PROCEDURE — 38525 BIOPSY/REMOVAL LYMPH NODES: CPT | Performed by: SURGERY

## 2018-04-24 PROCEDURE — 88307 TISSUE EXAM BY PATHOLOGIST: CPT | Performed by: PATHOLOGY

## 2018-04-24 PROCEDURE — 88361 TUMOR IMMUNOHISTOCHEM/COMPUT: CPT | Performed by: PATHOLOGY

## 2018-04-24 PROCEDURE — 88342 IMHCHEM/IMCYTCHM 1ST ANTB: CPT | Performed by: PATHOLOGY

## 2018-04-24 PROCEDURE — 19282 PERQ DEVICE BREAST EA IMAG: CPT

## 2018-04-24 PROCEDURE — 88341 IMHCHEM/IMCYTCHM EA ADD ANTB: CPT | Performed by: PATHOLOGY

## 2018-04-24 PROCEDURE — 19301 PARTIAL MASTECTOMY: CPT | Performed by: SURGERY

## 2018-04-24 PROCEDURE — A9541 TC99M SULFUR COLLOID: HCPCS

## 2018-04-24 PROCEDURE — 38900 IO MAP OF SENT LYMPH NODE: CPT | Performed by: SURGERY

## 2018-04-24 PROCEDURE — 19281 PERQ DEVICE BREAST 1ST IMAG: CPT

## 2018-04-24 RX ORDER — PROPOFOL 10 MG/ML
INJECTION, EMULSION INTRAVENOUS AS NEEDED
Status: DISCONTINUED | OUTPATIENT
Start: 2018-04-24 | End: 2018-04-24 | Stop reason: SURG

## 2018-04-24 RX ORDER — MIDAZOLAM HYDROCHLORIDE 1 MG/ML
INJECTION INTRAMUSCULAR; INTRAVENOUS AS NEEDED
Status: DISCONTINUED | OUTPATIENT
Start: 2018-04-24 | End: 2018-04-24 | Stop reason: SURG

## 2018-04-24 RX ORDER — SODIUM CHLORIDE 9 MG/ML
75 INJECTION, SOLUTION INTRAVENOUS CONTINUOUS
Status: DISCONTINUED | OUTPATIENT
Start: 2018-04-24 | End: 2018-04-24 | Stop reason: HOSPADM

## 2018-04-24 RX ORDER — MAGNESIUM HYDROXIDE 1200 MG/15ML
LIQUID ORAL AS NEEDED
Status: DISCONTINUED | OUTPATIENT
Start: 2018-04-24 | End: 2018-04-24 | Stop reason: HOSPADM

## 2018-04-24 RX ORDER — FENTANYL CITRATE/PF 50 MCG/ML
50 SYRINGE (ML) INJECTION
Status: DISCONTINUED | OUTPATIENT
Start: 2018-04-24 | End: 2018-04-24 | Stop reason: HOSPADM

## 2018-04-24 RX ORDER — IBUPROFEN 600 MG/1
600 TABLET ORAL EVERY 6 HOURS PRN
Status: DISCONTINUED | OUTPATIENT
Start: 2018-04-24 | End: 2018-04-24 | Stop reason: HOSPADM

## 2018-04-24 RX ORDER — LIDOCAINE WITH 8.4% SOD BICARB 0.9%(10ML)
5 SYRINGE (ML) INJECTION ONCE
Status: DISCONTINUED | OUTPATIENT
Start: 2018-04-24 | End: 2018-04-25 | Stop reason: HOSPADM

## 2018-04-24 RX ORDER — MEPERIDINE HYDROCHLORIDE 50 MG/ML
12.5 INJECTION INTRAMUSCULAR; INTRAVENOUS; SUBCUTANEOUS ONCE AS NEEDED
Status: DISCONTINUED | OUTPATIENT
Start: 2018-04-24 | End: 2018-04-24 | Stop reason: HOSPADM

## 2018-04-24 RX ORDER — LIDOCAINE HYDROCHLORIDE 10 MG/ML
INJECTION, SOLUTION INFILTRATION; PERINEURAL AS NEEDED
Status: DISCONTINUED | OUTPATIENT
Start: 2018-04-24 | End: 2018-04-24 | Stop reason: SURG

## 2018-04-24 RX ORDER — SODIUM CHLORIDE 9 MG/ML
125 INJECTION, SOLUTION INTRAVENOUS CONTINUOUS
Status: DISCONTINUED | OUTPATIENT
Start: 2018-04-24 | End: 2018-04-24

## 2018-04-24 RX ORDER — BUPIVACAINE HYDROCHLORIDE 5 MG/ML
INJECTION, SOLUTION PERINEURAL AS NEEDED
Status: DISCONTINUED | OUTPATIENT
Start: 2018-04-24 | End: 2018-04-24 | Stop reason: HOSPADM

## 2018-04-24 RX ORDER — TRAMADOL HYDROCHLORIDE 50 MG/1
50 TABLET ORAL EVERY 6 HOURS PRN
Status: DISCONTINUED | OUTPATIENT
Start: 2018-04-24 | End: 2018-04-24 | Stop reason: HOSPADM

## 2018-04-24 RX ORDER — ONDANSETRON 2 MG/ML
INJECTION INTRAMUSCULAR; INTRAVENOUS AS NEEDED
Status: DISCONTINUED | OUTPATIENT
Start: 2018-04-24 | End: 2018-04-24 | Stop reason: SURG

## 2018-04-24 RX ORDER — ISOSULFAN BLUE 50 MG/5ML
INJECTION, SOLUTION SUBCUTANEOUS AS NEEDED
Status: DISCONTINUED | OUTPATIENT
Start: 2018-04-24 | End: 2018-04-24 | Stop reason: HOSPADM

## 2018-04-24 RX ORDER — FENTANYL CITRATE 50 UG/ML
INJECTION, SOLUTION INTRAMUSCULAR; INTRAVENOUS AS NEEDED
Status: DISCONTINUED | OUTPATIENT
Start: 2018-04-24 | End: 2018-04-24 | Stop reason: SURG

## 2018-04-24 RX ORDER — ONDANSETRON 2 MG/ML
4 INJECTION INTRAMUSCULAR; INTRAVENOUS ONCE AS NEEDED
Status: DISCONTINUED | OUTPATIENT
Start: 2018-04-24 | End: 2018-04-24 | Stop reason: HOSPADM

## 2018-04-24 RX ADMIN — SODIUM CHLORIDE: 0.9 INJECTION, SOLUTION INTRAVENOUS at 14:21

## 2018-04-24 RX ADMIN — SODIUM CHLORIDE 75 ML/HR: 0.9 INJECTION, SOLUTION INTRAVENOUS at 10:10

## 2018-04-24 RX ADMIN — FENTANYL CITRATE 50 MCG: 50 INJECTION INTRAMUSCULAR; INTRAVENOUS at 15:18

## 2018-04-24 RX ADMIN — MIDAZOLAM HYDROCHLORIDE 2 MG: 1 INJECTION, SOLUTION INTRAMUSCULAR; INTRAVENOUS at 13:24

## 2018-04-24 RX ADMIN — PROPOFOL 200 MG: 10 INJECTION, EMULSION INTRAVENOUS at 13:29

## 2018-04-24 RX ADMIN — TRAMADOL HYDROCHLORIDE 50 MG: 50 TABLET, FILM COATED ORAL at 16:14

## 2018-04-24 RX ADMIN — ONDANSETRON HYDROCHLORIDE 8 MG: 2 INJECTION, SOLUTION INTRAVENOUS at 13:44

## 2018-04-24 RX ADMIN — DEXAMETHASONE SODIUM PHOSPHATE 8 MG: 10 INJECTION INTRAMUSCULAR; INTRAVENOUS at 13:44

## 2018-04-24 RX ADMIN — HYDROMORPHONE HYDROCHLORIDE 0.5 MG: 1 INJECTION, SOLUTION INTRAMUSCULAR; INTRAVENOUS; SUBCUTANEOUS at 15:35

## 2018-04-24 RX ADMIN — FENTANYL CITRATE 50 MCG: 50 INJECTION, SOLUTION INTRAMUSCULAR; INTRAVENOUS at 13:56

## 2018-04-24 RX ADMIN — HYDROMORPHONE HYDROCHLORIDE 0.5 MG: 1 INJECTION, SOLUTION INTRAMUSCULAR; INTRAVENOUS; SUBCUTANEOUS at 15:26

## 2018-04-24 RX ADMIN — FENTANYL CITRATE 50 MCG: 50 INJECTION INTRAMUSCULAR; INTRAVENOUS at 15:09

## 2018-04-24 RX ADMIN — FENTANYL CITRATE 50 MCG: 50 INJECTION, SOLUTION INTRAMUSCULAR; INTRAVENOUS at 13:32

## 2018-04-24 RX ADMIN — LIDOCAINE HYDROCHLORIDE 60 MG: 10 INJECTION, SOLUTION INFILTRATION; PERINEURAL at 13:29

## 2018-04-24 RX ADMIN — CEFAZOLIN SODIUM 1000 MG: 1 SOLUTION INTRAVENOUS at 13:41

## 2018-04-24 NOTE — H&P (VIEW-ONLY)
Surgical Oncology Follow Up       [facility] SURGICAL ONCOLOGY [place]  [address] [facility]  [place] [address]    Diomedes Paige  1965  [id number]  [facility] SURGICAL ONCOLOGY [place]  [address] 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  -     traMADol (ULTRAM) 50 mg tablet; Take 1 tablet (50 mg total) by mouth every 6 (six) hours as needed for moderate pain  -     Mammo needle localization right (all inc); Future  -     Mammo needle localization right (all inc) each add; Future  -     NM lymphatic breast; Future        Advance Care Planning/Advance Directives:  Did not discuss  with the patient  Oncology History:     No history exists  History of Present Illness: multifocal dcis right breast with microinvasion  -Interval History:none    Review of Systems:  Review of Systems   Constitutional: Negative  Negative for appetite change and fever  Eyes: Negative  Respiratory: Negative for shortness of breath  Cardiovascular: Negative  Gastrointestinal: Negative  Endocrine: Negative  Genitourinary: Negative  Musculoskeletal: Negative  Negative for arthralgias and myalgias  Skin: Negative  Allergic/Immunologic: Negative  Neurological: Negative  Hematological: Negative  Negative for adenopathy  Does not bruise/bleed easily  Psychiatric/Behavioral: Negative          Patient Active Problem List   Diagnosis    Ductal carcinoma in situ (DCIS) of breast with microinvasive component    Dense breast tissue     Past Medical History:   Diagnosis Date    Bilateral breast cysts     Breast tenderness     Frequent urination     Night sweats      Past Surgical History:   Procedure Laterality Date  BREAST BIOPSY Right 03 01/18    DCIS    THYROID SURGERY       Family History   Problem Relation Age of Onset    Prostate cancer Paternal Uncle 64     Social History     Social History    Marital status: /Civil Union     Spouse name: N/A    Number of children: N/A    Years of education: N/A     Occupational History    Not on file  Social History Main Topics    Smoking status: Never Smoker    Smokeless tobacco: Never Used    Alcohol use Yes      Comment: 8 drinks per week    Drug use: Unknown    Sexual activity: Not on file     Other Topics Concern    Not on file     Social History Narrative    No narrative on file       Current Outpatient Prescriptions:     levothyroxine 88 mcg tablet, Take 1 tablet by mouth daily, Disp: , Rfl:     Multiple Vitamin (MULTIVITAMIN) tablet, Take 1 tablet by mouth daily, Disp: , Rfl:     traMADol (ULTRAM) 50 mg tablet, Take 1 tablet (50 mg total) by mouth every 6 (six) hours as needed for moderate pain, Disp: 20 tablet, Rfl: 0  Allergies   Allergen Reactions    Clindamycin Other (See Comments)     Was painful to walk     Erythromycin Hives    Sulfamethoxazole-Trimethoprim     Amoxicillin Rash       The following portions of the patient's history were reviewed and updated as appropriate: allergies, current medications, past family history, past medical history, past social history, past surgical history and problem list         Vitals:    04/04/18 0820   BP: 90/80   Pulse: 72   Resp: 16   Temp: 98 2 °F (36 8 °C)       Physical Exam   Constitutional: She is oriented to person, place, and time  She appears well-developed and well-nourished  Cardiovascular: Normal heart sounds  Pulmonary/Chest: Breath sounds normal    Neurological: She is alert and oriented to person, place, and time  Psychiatric: She has a normal mood and affect           Results:      Imaging  3/20/18 bilateral breast MRI multifocal DCIS right breast, area of enhancement on the left for which a second-look ultrasound was recommended  03/27/2018 left breast ultrasound no correlate identified for the MRI finding therefore an MRI guided biopsy is recommended    I reviewed the above imaging data  Discussion/Summary:  24-year-old female with multifocal ductal carcinoma in situ of the right breast with an area of microinvasion  This appears to involve the entire radian of the breast   I discussed this with her and recommended either a bracketed lumpectomy versus a mastectomy  If she chooses the former, she will have asymmetry to the breast   She will also need radiation therapy  If the latter, she has the option of doing reconstruction at the same time of the mastectomy  She does prefers to save her breast   I therefore discussed a bracketed needle localized lumpectomy of the right breast along with sentinel node biopsy  As stated she will need radiation therapy  She will also meet with Medical Oncology in the postoperative setting to discuss any adjuvant therapy  If she has additional invasive foci, I will repeat her receptor status  She has an upcoming MRI guided biopsy of the contralateral breast scheduled for 4/16  I will plan for surgery on 04/24 to allow time for those results  If the MRI guided biopsy is benign, we will proceed as stated above with a needle localized bracketed right lumpectomy with lymphatic mapping and sentinel node biopsy  All of her questions were answered today and consent was signed in the office

## 2018-04-24 NOTE — OP NOTE
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 [D05 81]    Procedure(s) (LRB):  LUMPECTOMY BREAST NEEDLE LOCALIZED BRACKET (Right)  BIOPSY LYMPH NODE SENTINEL (Right)    Specimen(s):  ID Type Source Tests Collected by Time Destination   1 : Right Lumpectomy, Short Superior Long Lateral Tissue Breast, Right TISSUE EXAM Cheo Montemayor MD 2018 1401    2 : New Margin Inferior, Suture [place] True Margin  Tissue Breast, Right TISSUE EXAM Cheo Montemayor MD 2018 1408    3 : New Margin Medial, Suture Marks True Margin  Tissue Breast, Right TISSUE EXAM Cheo Montemayor MD 2018 1409    4 : New Margin Superior, Suture Marks True Margin  Tissue Breast, Right TISSUE EXAM Cheo Montemayor MD 2018 1410    5 : New Margin Posterior, Suture Marks True Margin Tissue Breast, Right TISSUE EXAM Cheo Montemayor MD 2018 1410    6 : Right [place] Node 1 Tissue Lymph Node, [place] TISSUE EXAM Cheo Montemayro MD 2018 1417        Estimated Blood Loss:   Minimal    Drains:       Anesthesia Type:   General    Operative Indications:  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 [D05 81]      Operative Findings:  Clip and wire in specimen with residual calcifications    Complications:   None    Procedure and Technique:  Needle localized lumpectomy right breast, bracket fashion; lymphatic mapping and sentinel node biopsy with use of gamma probe       Patient Disposition:  extubated and stable       Gilbert Hugo is a 24-year-old female who presented to my office with ductal carcinoma in situ with microinvasion of the right breast   She had additional imaging to include breast MRI which showed roughly a 5 to 7 cm extent of disease in the breast   She also had an area of concern on the contralateral breast and underwent a biopsy, which was benign  She was counseled on bracketed lumpectomy versus mastectomy  She opted for the former  She was also counseled on sentinel node biopsy  She presented the day of surgery to the radiology suite and underwent bracket localization of the right breast   She also had lymphoscintigraphy performed  She then presented to the operating room she was given general anesthesia  She had 1 g IV Ancef for antibiotic prophylaxis  She also had a 5 cc subareolar injection on the right side of Lymphazurin blue dye for lymphatic mapping  She was prepped and draped in the usual standard fashion  Time-out was performed  Attention was turned to the upper outer right breast   0 5% Marcaine plain, 10 cc, was used for supplemental anesthesia  An elliptical incision was created in the upper outer quadrant in between the two localization wires  Electrocautery was then used to dissect inferior and medial as well as superior to the level of the wires, which were then elevated into the wound  A broad margin was excised medial, inferior, lateral, superior and posterior  This was marked with a short stitch superior and a long stitch lateral and imaged in the operating room  The prior biopsy clip was noted as well as both localization wires, which were intact  There were also residual calcifications noted  The areas of concern were closest to the inferior, medial, superior and posterior margins  New margins were therefore excised in these locations and sutures were placed on the true margins  All breast specimens were then sent to pathology in formalin  Attention was then turned to the right axilla  There was an area of increased radioactive uptake at the pectoralis edge    Additional 0 5% Marcaine plain, 8 cc, was injected for supplemental anesthesia  An incision was created in this location through the skin and subcutaneous tissue  Electrocautery was then used to dissect through the remaining subcutaneous tissue and clavipectoral fascia to enter the axillary fat pad  In the high subpectoral region, there was a blue lymphatic channel that traced to a blue-stained and radioactive node  This was excised completely and submitted as sentinel node, right axilla  Following removal of this node there were no blue-stained, palpable or radioactive nodes remaining  There were hemoclips used on the draining vessel in the axilla  Both of her wounds were irrigated and hemostasis was achieved  Hemoclips were also placed within the lumpectomy cavity for anticipated radiation therapy  Her wounds were then closed in a layered fashion using interrupted 3-0 Monocryl suture and a running 4-0 Monocryl subcuticular stitch  The skin was cleaned and dried  Histacryl, fluffs and a surgical bra were applied  All counts were correct  The patient was then extubated and taken to recovery in stable condition      SIGNATURE: Donna Lima MD  DATE: April 24, 2018  TIME: 2:50 PM

## 2018-04-24 NOTE — DISCHARGE INSTRUCTIONS
POST-OPERATIVE CARE INSTRUCTIONS       Care after your procedure:   General  · Rest and relax for 24 hours, then gradually return to normal activities  · Do not preform any heavy lifting or strenuous physical activities for 14 days  · Your activity restrictions will be re-evaluated at your post op visit  · Drink clear liquids until you are certain there is no nausea, then resume a normal diet  · Do not drink alcohol, drive any vehicle, operate mechanical equipment or make critical decisions for at least 24 hours and until you are off any narcotic pain medications  The Incision  · Your incision is closed with:   dissolvable stiches just underneath the skin  · The incision is also covered with:                          clear waterproof glue  · A gauze-pad is covering the wound  Wound care  · Remove your gauze-pad after 24 hours  · You may then shower using soap and water to clean your incision  Gently dry the wound  · You may redress your wound with additional gauze and tape if you choose  · A little bruising at the wound site is normal     Medication  · Resume all previous medications  ·  Take either Naproxen (Aleve) one tablet every 8 hours or Ibuprofen(Advil/Motrin) one(1) to two(2) tablets every 6 hours around the clock for the first 2-3 days  Take this even if you don't think you need it for at least the first 24 hours  · Pain Medication Instructions:tramadol as prescribed          Other (If applicable)  · Wear a post-surgical bra around the clock  · May use ice to the incision site(s) for the next 24-48 hours, twice daily     Call your  doctor if you have any of the following:  · Redness, swelling, heat, drainage, and/or bleeding from your wound  · Chills or fever ( above 101' F )  · Pain, not relieved with the above medications  · If you have any questions or problems call our office [phone]    Follow-up appointment:  · As scheduled

## 2018-04-25 ENCOUNTER — DOCUMENTATION (OUTPATIENT)
Dept: HEMATOLOGY ONCOLOGY | Facility: CLINIC | Age: 53
End: 2018-04-25

## 2018-04-25 NOTE — PROGRESS NOTES
Oncology Navigator Visit    Assessment:  [facility] purpose of navigation follow-up post surgery  Pt encouraged to call for any questions and or concerns  Contact information provided  Will continue to outreach PRN        Potential Barriers:    Care Coordination:    Communication/Education:    Cultural/B[employer]ist/Spiritual:    Health Promotion:    Insurance/Medical Costs:    Logistical:    Psychosocial/Distress/Behavioral:    Work/School:    Interventions:    Referrals:        Comments:

## 2018-05-01 ENCOUNTER — TELEPHONE (OUTPATIENT)
Dept: HEMATOLOGY ONCOLOGY | Facility: CLINIC | Age: 53
End: 2018-05-01

## 2018-05-01 ENCOUNTER — DOCUMENTATION (OUTPATIENT)
Dept: HEMATOLOGY ONCOLOGY | Facility: CLINIC | Age: 53
End: 2018-05-01

## 2018-05-01 NOTE — PROGRESS NOTES
Oncology Navigator Visit  Spoke to pt on phone  Pt reported having a "swishing sensation" to area of surgical incision  She stated that she "spoke with the office about this last week but thought it would have stopped by now"  Pt voiced no other symptoms or concerns  We additionally discussed use of her surgical arm, exercise and physical therapy  Pt was given contact information and encouraged to call for any further concerns  Pt agreeable  Kat Sainz RN made aware  Pt has follow up on 5/5/18 with Dr Luis Garcia       Assessment:      Potential Barriers:    Care Coordination:    Communication/Education:    Cultural/Scientologist/Spiritual:    Health Promotion:    Insurance/Medical Costs:    Logistical:    Psychosocial/Distress/Behavioral:    Work/School:    Interventions:    Referrals:        Comments:

## 2018-05-08 ENCOUNTER — OFFICE VISIT (OUTPATIENT)
Dept: SURGICAL ONCOLOGY | Facility: CLINIC | Age: 53
End: 2018-05-08

## 2018-05-08 VITALS
BODY MASS INDEX: 27.64 KG/M2 | SYSTOLIC BLOOD PRESSURE: 102 MMHG | DIASTOLIC BLOOD PRESSURE: 70 MMHG | HEIGHT: 66 IN | WEIGHT: 172 LBS | RESPIRATION RATE: 18 BRPM | TEMPERATURE: 98.5 F | HEART RATE: 60 BPM

## 2018-05-08 DIAGNOSIS — C50.411 MALIGNANT NEOPLASM OF UPPER-OUTER QUADRANT OF RIGHT BREAST IN FEMALE, ESTROGEN RECEPTOR POSITIVE (HCC): Primary | ICD-10-CM

## 2018-05-08 DIAGNOSIS — Z17.0 MALIGNANT NEOPLASM OF UPPER-OUTER QUADRANT OF RIGHT BREAST IN FEMALE, ESTROGEN RECEPTOR POSITIVE (HCC): Primary | ICD-10-CM

## 2018-05-08 PROCEDURE — 99024 POSTOP FOLLOW-UP VISIT: CPT | Performed by: SURGERY

## 2018-05-08 NOTE — PROGRESS NOTES
45 y o  female is here today s/p right breast lumpectomy, SLNB on 18  Her incision lines are dry, intact and healing well  She has mild discomfort at surgical sites  Physical Exam   Constitutional: She appears well-developed and well-nourished  Pulmonary/Chest: Right breast exhibits skin change (well healing lumpectomy and axillary scars)  Psychiatric: She has a normal mood and affect  Data:       Stagin mm IDC is largest focus-2 additional 0 8mm foci and extensive DCIS  Tumor grade 2  LVI none  Margins clean for invasive and in situ  Estrogen receptor and progesterone receptor status ER +, SC-  HER2 status and test method 2+, FISH is pending   Lymph node assessment/status 0/1      Neoadjuvant therapy: none  Stage: IA      Diagnoses and all orders for this visit:    Malignant neoplasm of upper-outer quadrant of right breast in female, estrogen receptor positive ([facility] [address] )  -     Ambulatory referral to Hematology / Oncology; Future  -     Ambulatory referral to Radiation Oncology; Future        Assessment:  49-year-old female status post right breast conservation for a stage IA carcinoma with the largest focus being 4 mm  Plan:  I am referring her to Medical and Radiation Oncology  I will see her again in three months or sooner should the need arise

## 2018-05-11 ENCOUNTER — APPOINTMENT (OUTPATIENT)
Dept: RADIATION ONCOLOGY | Facility: CLINIC | Age: 53
End: 2018-05-11
Attending: RADIOLOGY
Payer: COMMERCIAL

## 2018-05-11 ENCOUNTER — RADIATION ONCOLOGY CONSULT (OUTPATIENT)
Dept: RADIATION ONCOLOGY | Facility: CLINIC | Age: 53
End: 2018-05-11

## 2018-05-11 VITALS
HEART RATE: 67 BPM | HEIGHT: 66 IN | SYSTOLIC BLOOD PRESSURE: 92 MMHG | RESPIRATION RATE: 16 BRPM | TEMPERATURE: 98.2 F | DIASTOLIC BLOOD PRESSURE: 60 MMHG | WEIGHT: 176.2 LBS | BODY MASS INDEX: 28.32 KG/M2

## 2018-05-11 DIAGNOSIS — Z17.0 MALIGNANT NEOPLASM OF UPPER-OUTER QUADRANT OF RIGHT BREAST IN FEMALE, ESTROGEN RECEPTOR POSITIVE (HCC): ICD-10-CM

## 2018-05-11 DIAGNOSIS — C50.411 MALIGNANT NEOPLASM OF UPPER-OUTER QUADRANT OF RIGHT BREAST IN FEMALE, ESTROGEN RECEPTOR POSITIVE (HCC): ICD-10-CM

## 2018-05-11 PROCEDURE — 99214 OFFICE O/P EST MOD 30 MIN: CPT | Performed by: RADIOLOGY

## 2018-05-11 PROCEDURE — G0463 HOSPITAL OUTPT CLINIC VISIT: HCPCS | Performed by: RADIOLOGY

## 2018-05-11 NOTE — PROGRESS NOTES
Surya Corea  1965  Ms Samira Granado is a 45 y o  female    Chief Complaint   Patient presents with    Consult     radiation oncology     Assessment:  Stage IA, pT1a (4 x 3 mm grade 2 invasive carcinoma, 4 mm size and 2 additional foci of 0 8 mm all margins negative, extensive high-grade DCIS which measured 2 6 cm approximately 85% of the lesion all margins negative greater than 2 mm), LCIS present and extensive, pN0 (3 sentinel node negative for metastatic carcinoma), cM0, ER positive 90-95%, LA less than 1% negative and Her 2 by IHC 2+ equivocal fish pending  Plan:  Adjuvant right breast irradiation  Discussion and summary:  Patient has an appointment to see Dr Mauricio Leon 21 at which time fish analysis of Her2 should be available  We discussed treatment course of more likely the standard of 33 treatments 50 4 Gy in 28 treatments to the entire breast tissue and boost to the lumpectomy cavity for additional dose of 10 Gy in 5 treatments  We also discussed the shorter hypofractionation course but by inspection today her breast size will probably fail to meet the criteria but the final determination will be after the CT scan simulation and planning  We inform patient of the side effects of skin reaction usually inframammary and lower axilla areas will show up towards the end of treatment and increasing moderate fatigue as she progress through the treatment course  We also discuss late treatment sequela of muscle pains or edema which will resolved by post treatment breast massage  We gave her a simulation appointment between her planned vacation weeks in June after Deepthi 3 but before June 9  Physical examination:  She is well-developed and looks her stated age, highly cooperative without any complaints  There are no palpable nodes  Lungs are clear  No breast masses  No seroma, edema or hematoma of the right breast with slightl tenderness on the incision which is well-healed    Heart tones are normal with sinus rhythm  Abdomen without masses  Cancer Staging  Malignant neoplasm of upper-outer quadrant of right breast in female, estrogen receptor positive ([facility] [address] )  Staging form: Breast, AJCC 8th Edition  - Clinical: cT1mi, cN0, cM0 - Signed by Eloy Whittington MD on 3/14/2018  - Pathologic: Stage IA (pT1a, pN0(sn), cM0, G2, ER: Positive, CA: Negative, HER2: Equivocal) - Signed by Eloy Whittington MD on 5/8/2018      Oncology History    Nulliparous  Menarche 13  Hx BCP x2 years  No HRT  Age at menopause - approximately at age 46  No family history of breast cancer  2/6/18 Bilateral screening mammogram  (asymptomatic):  New outer right breast calcifications  Stable left mammogram     2/27/18 Diagnostic RIGHT mammogram:  Right breast 9:00 calcifications are suspicious  Stereotactic biopsy is recommended  ACR BI-RADS® Assessments: BiRad:4 - Suspicious    3/11/18 RIGHT breast biopsy:  ER/CA negative, HER2 by IHC 3+ positive  Microinvasive carcinoma arising in extensive high-grade ductal carcinoma in-situ (DCIS), grade     3/14/18 Dr Fu consult    3/20/18 Bilateral breast MRI:  Findings suspicious for segmental multifocal DCIS throughout the 9-10 o'clock region in the right breast      Single focus of indeterminate intermediate suspicion nodular   enhancement in the left posterior outer lower quadrant  Recommend 2nd look ultrasound  3/27/18 LEFT breast US:  Small 5 x 4 mm cystic focus is identified in the 4 o'clock position 5 cm from   the nipple  No competent sonographic correlate for the left breast MRI enhancing nodule  4/16/18 MRI guided LEFT breast biopsy:  No malignancy,  in-situ carcinoma and atypical hyperplasia  4/24/18 RIGHT breast lumpectomy and SLN biopsy:  Stage IA - pT1a, pN0(sn), cM0, G2, ER 90-95% positive, CA <1% negative, HER2 by IHC 2+ equivocal; FISH pending  IDC 4 mm, with 2 additional foci of 0 8 mm; extensive DCIS, 2 6 cm, LCIS present, extensive  No LVI   Margins negative  0/3 negative SLN's     5/8/18 Dr Jose Castle post op visit  Refer to medical and radiation oncology  To return in 3 months     5/21/18 Dr Awilda Acosta consult    Vacation planned for weeks of 5/24 - 6/3/18 and 6/9-6/16/18    Mild right breast soreness, incisions are  closed and healing  She works full time as  for [employer]  Malignant neoplasm of upper-outer quadrant of right breast in female, estrogen receptor positive ([facility] [address] )    3/1/2018 Initial Diagnosis     Malignant neoplasm of upper-outer quadrant of right breast in female, estrogen receptor positive ([facility] [address] )         3/1/2018 Biopsy     RIGHT breast biopsy:  ER/SD negative, HER2 by IHC 3+ positive  Microinvasive carcinoma arising in extensive high-grade ductal carcinoma in-situ (DCIS), grade            4/24/2018 Surgery     LUMPECTOMY BREAST NEEDLE LOCALIZED BRACKET (Right)  BIOPSY LYMPH NODE SENTINEL (Right) - Dr Jose Castle         4/24/2018 -  Cancer Staged     Stage IA - pT1a, pN0(sn), cM0, G2, ER 90-95% positive, SD <1% negative, HER2 by IHC 2+ equivocal; FISH pending  4 mm IDC is largest focus-2 additional 0 8mm foci and extensive DCIS  No LVI   Margins negative for invasive and in situ  Lymph node assessment/status 0/3               Clinical Trial: no    Screening  Tobacco  Current tobacco user: no  If yes, brief counseling provided: NA    Hypertension  Hypertension screening performed: yes  Normotensive:  yes  If no, referred to PCP: n/a    Depression Screening  Screened for depression using PHQ-2: yes    Screened for depression using PHQ-9:  n/a  Screening positive or negative:  negative  If score >4, was any of the following actions taken?    Additional evaluation for depression, suicide risk assesment, referral to PCP or psychiatry, medication started:  n/a    Advanced Care Planning for Patients >65 years  Advanced Care Planning Discussed:  n/a  Patient named surrogate decision maker or care plan in chart: n/a    OB/GYN History:  The patient underwent menarche at 13 years  Menopause Status Pre, Brenna, Post, Unknown and No Answer  No LMP recorded  Patient is postmenopausal   Menopause at 48} years  Menopause Reason - normal  Hormone replacement therapy: no      0   Para 0   Age at first delivery being n/a   Nursing: not applicable  Birth control pills: yes    Years used 2      Health Maintenance   Topic Date Due    HIV SCREENING  1965    Hepatitis C Screening  1965    PNEUMOCOCCAL POLYSACCHARIDE VACCINE X 2 AGE 11-64 YEARS IMMUNOCOMPROMISED (1) 1974    Depression Screening PHQ-9  1975    DTaP,Tdap,and Td Vaccines (1 - Tdap) 1984    INFLUENZA VACCINE  2018    COLONOSCOPY  2023       Patient Active Problem List   Diagnosis    Malignant neoplasm of upper-outer quadrant of right breast in female, estrogen receptor positive ([facility] [address] )    Dense breast tissue     Past Medical History:   Diagnosis Date    Anxiety     Bilateral breast cysts     Breast tenderness     Depression     Frequent urination     Hashimoto's disease     Hypothyroidism     Night sweats     Palpitations     improved since lowered caffeine intake    Tinnitus     b/l    Viral syndrome      Past Surgical History:   Procedure Laterality Date    BREAST BIOPSY Right     DCIS    BREAST BIOPSY Left 2018    benign    BREAST LUMPECTOMY Right 2018    Procedure: LUMPECTOMY BREAST NEEDLE LOCALIZED BRACKET;  Surgeon: Himanshu Frausto MD;  Location: [facility];  Service: Surgical Oncology    COLONOSCOPY      ESOPHAGOGASTRODUODENOSCOPY      LYMPH NODE BIOPSY Right 2018    Procedure: BIOPSY LYMPH NODE SENTINEL;  Surgeon: Himanshu Frausto MD;  Location: [facility];  Service: Surgical Oncology    WISDOM TOOTH EXTRACTION       Family History   Problem Relation Age of Onset    Prostate cancer Paternal Uncle 64    No Known Problems Mother     Dementia Father     Heart disease Father Social History     Social History    Marital status: /Civil Union     Spouse name: N/A    Number of children: N/A    Years of education: N/A     Occupational History    Not on file  Social History Main Topics    Smoking status: Never Smoker    Smokeless tobacco: Never Used    Alcohol use 4 2 oz/week     7 Standard drinks or equivalent per week      Comment: 7 -8 drinks a week    Drug use: No    Sexual activity: Not on file     Other Topics Concern    Not on file     Social History Narrative    No narrative on file       Current Outpatient Prescriptions:     levothyroxine 88 mcg tablet, Take 1 tablet by mouth daily, Disp: , Rfl:     Multiple Vitamin (MULTIVITAMIN) tablet, Take 1 tablet by mouth daily, Disp: , Rfl:     traMADol (ULTRAM) 50 mg tablet, Take 1 tablet (50 mg total) by mouth every 6 (six) hours as needed for moderate pain, Disp: 20 tablet, Rfl: 0    Allergies   Allergen Reactions    Clindamycin Other (See Comments)     Was painful to walk     Erythromycin Hives    Sulfamethoxazole-Trimethoprim Hives    Amoxicillin Rash       Review of Systems:  Review of Systems   Constitutional: Negative  HENT: Positive for tinnitus (both ears)  Eyes: Negative  Respiratory: Negative  Cardiovascular: Negative  Gastrointestinal: Negative  Endocrine: Negative  Genitourinary: Negative  Musculoskeletal: Negative  Skin: Negative  Allergic/Immunologic: Negative  Neurological: Negative  Hematological: Negative  Psychiatric/Behavioral: Negative  Vitals:    05/11/18 0940   BP: 92/60   Pulse: 67   Resp: 16   Temp: 98 2 °F (36 8 °C)   TempSrc: Temporal   Weight: 79 9 kg (176 lb 3 2 oz)   Height: 5' 6" (1 676 m)       Pain Score:   4    Imaging:Xr Chest Pa & Lateral    Result Date: 4/14/2018  Narrative: CHEST INDICATION:   D05 81: Other specified type of carcinoma in situ of right breast   Preop   COMPARISON:  None EXAM PERFORMED/VIEWS:  XR CHEST PA & LATERAL FINDINGS: Cardiomediastinal silhouette appears unremarkable  The lungs are clear  No pneumothorax or pleural effusion  Osseous structures appear within normal limits for patient age  Impression: No acute cardiopulmonary disease  Workstation performed: [id number]     Nm Lymphatic Breast    Result Date: 4/24/2018  Narrative: SENTINEL NODE LYMPHOSCINTIGRAPHY INDICATION: Right breast carcinoma FINDINGS: 0 520 mCi Tc-99m sulfur colloid (0 6 cc volume) was administered in divided doses by Dr Perry Lima in the right periareolar region  Scintigraphic images were obtained over the hemithorax and axilla in multiple projections  Node identified  Using scintigraphic guidance, the corresponding skin site was marked with an indelible marker  The patient was transferred to the operating room in satisfactory condition  Impression: [place] lymph node localized to right axilla  Workstation performed: [id number]     Mri Breast Biopsy Left (all Inclusive)    Result Date: 4/16/2018  Narrative: Focus of enhancement at the 5 o'clock position of the left breast   Patient has a history of recently diagnosed right breast cancer  MRI Breast Biopsy Left: April 16, 2018 - Check In #: [de-identified] Technologists: Dhaval Conti, RT (R) MR; William Saunders, Technologist After informed consent was obtained, the patient was positioned for an MRI guided core biopsy of the left breast   Sagittal and axial pre and postcontrast imaging was obtained  The previously described 5:00 enhancing lesion was again identified  Betadine was used as sterile prep and 1% lidocaine as local anesthetic  After a small skin incision was made, a fiducial marker was used to targeted the lesion  After marker position was confirmed, an ATEC vacuum assisted needle was advanced to the lesion under MRI guidance  Multiple core biopsies were then obtained  The core specimens were then sent to Pathology for further evaluation    A clip was then deployed in order to mey the biopsy site to facilitate future intervention if necessary  Postbiopsy sequences demonstrate clip approximation with the previously described MRI finding  The patient tolerated the procedure well and left the department in stable condition  IMPRESSION: Successful MRI guided left breast biopsy  Pathology Results: Pending Recommendation: Pathology pending of the left breast  The patient is scheduled in a reminder system for screening mammography  Transcription Location: [address] [place], [place] [address]way: [id number]    Mammo Needle Localization Right ([employer])    Result Date: 4/24/2018  Narrative: Mammo Needle Localization Right ([employer]): April 24, 2018 - Check In #: [de-identified] CC and LM view(s) were taken of the right breast  Technologists: DAVID Crisostomo (R)(M); DAVID Sy (DAVID)(M) INDICATION: Biopsy-proven microinvasive carcinoma arising in extensive high-grade ductal carcinoma in situ in the right breast  FINDINGS: After obtaining informed consent for the procedure at which time the risks and benefits were explained to the patient including bleeding, infection, and pneumothorax, the right breast was placed in the alphanumeric grid  Utilizing sterile technique, 1% lidocaine was utilized for local anesthesia and a 5 cm BD needle was advanced adjacent to the biopsy clip in the anterior 3rd of the lateral breast  Again utilizing mammographic evidence and local anesthesia, a 2nd 7 mm BD needle was advanced in the posterior lateral upper breast adjacent to indeterminate microcalcifications which enhanced on recent MRI, likely multifocal DCIS  The hook wires were then deployed in routine fashion and the needles removed  The patient tolerated the procedure well with no immediate post procedural complications demonstrated  IMPRESSION: Bracket needle localization of the right lateral breast  Successful bracket needle localization of the right breast  Recommendation: Clinical management   Transcription Location: [id number] Signing Station: [id number]    Mammo Needle Localization Right ([employer]) Each Add    Result Date: 4/24/2018  Narrative: Mammo Needle Localization Right ([employer]) Each Additional: April 24, 2018 - Check In #: [de-identified] Technologist: DAVID Vásquez (DAVID)(M) INDICATION: Biopsy-proven microinvasive carcinoma arising in extensive high-grade ductal carcinoma in situ in the right breast  FINDINGS: After obtaining informed consent for the procedure at which time the risks and benefits were explained to the patient including bleeding, infection, and pneumothorax, the right breast was placed in the alphanumeric grid  Utilizing sterile technique, 1% lidocaine was utilized for local anesthesia and a 5 cm BD needle was advanced adjacent to the biopsy clip in the anterior 3rd of the lateral breast  Again utilizing mammographic evidence and local anesthesia, a 2nd 7 mm BD needle was advanced in the posterior lateral upper breast adjacent to indeterminate microcalcifications which enhanced on recent MRI, likely multifocal DCIS  The hook wires were then deployed in routine fashion and the needles removed  The patient tolerated the procedure well with no immediate post procedural complications demonstrated  IMPRESSION: Bracket needle localization of the right lateral breast  Successful bracket needle localization of the right breast  Recommendation: Clinical management  Transcription Location: [address] [place], [place] [address]way: [id number]    Mammo Pathology Report    Result Date: 4/18/2018  Narrative: Mammo Pathology Letter: April 16, 2018 - Check In #: [de-identified] Technologists: Edilma Oslon,  (R) MR; Bhargavi Paul, Technologist DEAR DR Virginia Mo, Thank you for the recent referral of the above named patient to [place] [address] for MRI guided needle localization of the left breast  The results of her recent procedure have been made available to me from pathology   The results are consistent with benign proliferative and nonproliferative fibrocystic changes without atypia  Fibroadenomatoid hyperplasia  The pathology is concordant with the radiographic appearance  The recommendation is for  continued surgical follow-up  A six-month postoperative MRI follow-up is recommended to evaluate the stability of this parenchymal enhancement pattern  Our nurse navigators, depending on your known office preferences, will ensure that the results and recommendations have been communicated to the patient  If there is any clarification needed, please do not hesitate to contact the [address]  at [phone]  Thank you, SUDHAKAR Carranza  Transcription Location: [address] [place] [address]: [id number]    Mammo Breast Specimen (no Charge)    Result Date: 2018  Narrative: Mammo Breast Specimen No Charge: Right Breast - 2018 - Check In #: [de-identified] The image of the right breast specimen demonstrates the 2 localization wires and biopsy clip to be within the specimen as were indeterminate calcifications adjacent to the posterior wire  Localized biopsy clip and indeterminate calcifications within the right breast specimen   Transcription Location: [address] [place] [address]: [id number]      [address] RT packet with RT to breast information provided

## 2018-05-17 ENCOUNTER — TELEPHONE (OUTPATIENT)
Dept: SURGICAL ONCOLOGY | Facility: HOSPITAL | Age: 53
End: 2018-05-17

## 2018-05-17 NOTE — TELEPHONE ENCOUNTER
Pt called with concerns of her post op breast incision having a" rash"  She denies fever, warmth to touch, swelling or pain  She denies area being raised  She just sees some redness at site  Instructed her to call office with any further change of fever, redness, swelling warmth or drainage  She understands and agrees

## 2018-05-21 ENCOUNTER — OFFICE VISIT (OUTPATIENT)
Dept: HEMATOLOGY ONCOLOGY | Facility: CLINIC | Age: 53
End: 2018-05-21
Payer: COMMERCIAL

## 2018-05-21 VITALS
TEMPERATURE: 98.5 F | BODY MASS INDEX: 28.82 KG/M2 | OXYGEN SATURATION: 97 % | RESPIRATION RATE: 16 BRPM | SYSTOLIC BLOOD PRESSURE: 90 MMHG | HEIGHT: 65 IN | DIASTOLIC BLOOD PRESSURE: 70 MMHG | HEART RATE: 77 BPM | WEIGHT: 173 LBS

## 2018-05-21 DIAGNOSIS — Z17.0 MALIGNANT NEOPLASM OF UPPER-OUTER QUADRANT OF RIGHT BREAST IN FEMALE, ESTROGEN RECEPTOR POSITIVE (HCC): ICD-10-CM

## 2018-05-21 DIAGNOSIS — C50.411 MALIGNANT NEOPLASM OF UPPER-OUTER QUADRANT OF RIGHT BREAST IN FEMALE, ESTROGEN RECEPTOR POSITIVE (HCC): ICD-10-CM

## 2018-05-21 PROCEDURE — 99245 OFF/OP CONSLTJ NEW/EST HI 55: CPT | Performed by: INTERNAL MEDICINE

## 2018-05-21 RX ORDER — LEVOFLOXACIN 500 MG/1
500 TABLET, FILM COATED ORAL EVERY 24 HOURS
Qty: 7 TABLET | Refills: 0 | Status: SHIPPED | OUTPATIENT
Start: 2018-05-21 | End: 2018-05-28

## 2018-05-21 RX ORDER — ANASTROZOLE 1 MG/1
1 TABLET ORAL DAILY
Qty: 90 TABLET | Refills: 1 | Status: SHIPPED | OUTPATIENT
Start: 2018-05-21 | End: 2018-09-28 | Stop reason: SDUPTHER

## 2018-05-21 NOTE — LETTER
May 21, 2018     Geovanna Coates MD  [address] [facility]  [address] [facility] [address]     Patient: Shira Gloria   YOB: 1965   Date of Visit: 5/21/2018       Dear Dr Patel Resides: Thank you for referring Barbie Baeza to me for evaluation  Below are my notes for this consultation  If you have questions, please do not hesitate to call me  I look forward to following your patient along with you  Sincerely,        Beau Ralph MD        CC: MD Beau Mg MD  5/21/2018  4:20 PM  Sign at close encounter  Hematology / Oncology Outpatient Consult Note    Shira Gloria 45 y o  female [id number]         Date:  5/21/2018    Assessment / Plan:  A 49-year-old postmenopausal woman with newly diagnosed stage IA right breast cancer, grade 2, ER 90% positive, GA negative, HER2 equivocal disease  Interestingly enough, initial biopsy which showed micro invasion was ER negative, GA negative HER2 3+ disease  She underwent lumpectomy with sentinel lymph node biopsy, resulting in FILEMON  Her tumor size was 4 mm  We had extensive discussion regarding the diagnosis, discrepancy of tumor phenotype between biopsy and lumpectomy, staging information, good prognosis, and treatment options  Because of the discrepant tumor phenotype as well as borderline size of tumor, several treatment options should be considered as follows  1   Adjuvant hormonal therapy with anastrozole alone  2   Adjuvant hormonal therapy with trastuzumab for 1 year with anastrozole  3   Adjuvant chemotherapy with weekly paclitaxel and trastuzumab followed by trastuzumab monotherapy for a year as well as anastrozole  With her tumor size, there is no definitive data that trastuzumab of improved the chance of cure, even if she had truly HER 2 positive disease which is somewhat questionable  For the same reason, there is no definitive data, that adjuvant chemotherapy make reasonable difference    Side effects of trastuzumab as well as chemotherapy was thoroughly discussed  After the lengthy discussion, she chose to be on anastrozole monotherapy which is reasonable decision  Side effects of anastrozole was thoroughly discussed, including but not limited to hot flashes, musculoskeletal symptoms and bone mineral density loss  I recommended her to take calcium vitamin-D on a regular basis  She may start radiation therapy any time  I will see her again in 4 months for routine follow-up  She is in agreement  All the patient questions were answered to her satisfaction  Addendum; she appeared to have some cellulitis at the operative site, I prescribed Levaquin  She may contact Dr Loco Smith if she has no improvement or worsening of skin change  Subjective:     HPI:  A 75-year-old postmenopausal woman who had last menstrual cycle at age of 44 was found to have mammographic abnormality in her right breast   Therefore, she underwent right breast biopsy in March 1, 2018 which showed micro invasive carcinoma with extensive DCIS  Micro invasive carcinoma was ER negative, DE negative, her 2 3+ disease  Subsequently, she underwent lumpectomy with sentinel lymph node biopsy by Dr Loco Smith in April 24, 2018  She had 4 x 3 mm of invasive ductal carcinoma, grade 2  Lymphovascular invasion was not seen  One sentinel lymph node was negative for metastatic disease  This was ER 90% positive, DE negative, her 2 2+ disease  HER2 fish was equivocal with ratio of 1 4  She presents today to discuss adjuvant treatment options  She has no complaint of pain  However, since last week, she has noticed some erythematous skin change at the lumpectomy site  There is no fever, chills or night sweats  She denied any respiratory symptoms  She only has hypothyroidism as past medical history  She has no history of major surgery  She denied family history of breast or ovarian cancer    Her performance status is normal         Interval History:          Objective:     Primary Diagnosis:    1  Right breast cancer, stage IA  (pT1a, pN0, M0) grade 2, ER 90% positive, HI negative, HER2 fish equivocal  (initial biopsy report micro invasive cancer, ER negative, HI negative, her 2 3+ disease )  Diagnosed in April 2018  Cancer Staging:  Cancer Staging  Malignant neoplasm of upper-outer quadrant of right breast in female, estrogen receptor positive ([address] )  Staging form: Breast, AJCC 8th Edition  - Clinical: cT1mi, cN0, cM0 - Signed by Chelsea Lin MD on 3/14/2018  - Pathologic: Stage IA (pT1a, pN0(sn), cM0, G2, ER: Positive, HI: Negative, HER2: Equivocal) - Signed by Chelsea Lin MD on 5/8/2018        Previous Hematologic/ Oncologic Treatment:         Current Hematologic/ Oncologic Treatment:      Adjuvant hormonal therapy with anastrozole to be started in May 2018  Disease Status:     FILEMON status post lumpectomy with sentinel lymph node biopsy  Test Results:    Pathology:    Initial biopsy showed micro invasive cancer with extensive DCIS  Micro invasive part was ER negative, HI negative, her 2 3+ disease  Lumpectomy showed 4 x 3 mm of invasive ductal carcinoma, grade 2  No evidence of lymphovascular invasion  1 sentinel lymph node was negative for metastatic disease  This was ER 90 -95 % positive, HI negative, HER2  2+ disease  HER2 fish was equivocal with ratio of 1 4  Stage IA (pT1a, pN0, M0)    Radiology:    Chest x-ray was negative for pulmonary disease  Laboratory:    CBC and CMP are within normal limits  Physical Exam:      General Appearance:    Alert, oriented        Eyes:    PERRL   Ears:    Normal external ear canals, both ears   Nose:   Nares normal, septum midline   Throat:   Mucosa moist  Pharynx without injection      Neck:   Supple       Lungs:     Clear to auscultation bilaterally   Chest Wall:    No tenderness or deformity    Heart:    Regular rate and rhythm       Abdomen:     Soft, non-tender, bowel sounds +, no organomegaly           Extremities:   Extremities no cyanosis or edema       Skin:   no rash or icterus  Lymph nodes:   Cervical, supraclavicular, and axillary nodes normal   Neurologic:   CNII-XII intact, normal strength, sensation and reflexes     Throughout          Breast exam: Lumpectomy scar at lateral aspect of her right breast with minor erythematous skin change  No palpable abnormality  Left breast exam is negative  ROS: Review of Systems   All other systems reviewed and are negative  Imaging: Nm Lymphatic Breast    Result Date: 4/24/2018  Narrative: SENTINEL NODE LYMPHOSCINTIGRAPHY INDICATION: Right breast carcinoma FINDINGS: 0 520 mCi Tc-99m sulfur colloid (0 6 cc volume) was administered in divided doses by Dr Silvana Hollis in the right periareolar region  Scintigraphic images were obtained over the hemithorax and axilla in multiple projections  Node identified  Using scintigraphic guidance, the corresponding skin site was marked with an indelible marker  The patient was transferred to the operating room in satisfactory condition  Impression: Ang[place] lymph node localized to right axilla  Workstation performed: [id number]     Mammo Needle Localization Right ([employer])    Result Date: 4/24/2018  Narrative: Mammo Needle Localization Right [employer]): April 24, 2018 - Check In #: [de-identified] CC and LM view(s) were taken of the right breast  Technologists: DAVID Weldon (R)(M); DAVID Overton (R)(M) INDICATION: Biopsy-proven microinvasive carcinoma arising in extensive high-grade ductal carcinoma in situ in the right breast  FINDINGS: After obtaining informed consent for the procedure at which time the risks and benefits were explained to the patient including bleeding, infection, and pneumothorax, the right breast was placed in the alphanumeric grid   Utilizing sterile technique, 1% lidocaine was utilized for local anesthesia and a 5 cm BD needle was advanced adjacent to the biopsy clip in the anterior 3rd of the lateral breast  Again utilizing mammographic evidence and local anesthesia, a 2nd 7 mm BD needle was advanced in the posterior lateral upper breast adjacent to indeterminate microcalcifications which enhanced on recent MRI, likely multifocal DCIS  The hook wires were then deployed in routine fashion and the needles removed  The patient tolerated the procedure well with no immediate post procedural complications demonstrated  IMPRESSION: Bracket needle localization of the right lateral breast  Successful bracket needle localization of the right breast  Recommendation: Clinical management  Transcription Location: [address] [place], [place] [address]: [id number]    Mammo Needle Localization Right [employer]) Each Add    Result Date: 4/24/2018  Narrative: Mammo Needle Localization Right [employer]) Each Additional: April 24, 2018 - Check In #: [de-identified] Technologist: DAVID Hayden (DAVID)(M) INDICATION: Biopsy-proven microinvasive carcinoma arising in extensive high-grade ductal carcinoma in situ in the right breast  FINDINGS: After obtaining informed consent for the procedure at which time the risks and benefits were explained to the patient including bleeding, infection, and pneumothorax, the right breast was placed in the alphanumeric grid  Utilizing sterile technique, 1% lidocaine was utilized for local anesthesia and a 5 cm BD needle was advanced adjacent to the biopsy clip in the anterior 3rd of the lateral breast  Again utilizing mammographic evidence and local anesthesia, a 2nd 7 mm BD needle was advanced in the posterior lateral upper breast adjacent to indeterminate microcalcifications which enhanced on recent MRI, likely multifocal DCIS  The hook wires were then deployed in routine fashion and the needles removed  The patient tolerated the procedure well with no immediate post procedural complications demonstrated   IMPRESSION: Bracket needle localization of the right lateral breast  Successful bracket needle localization of the right breast  Recommendation: Clinical management  Transcription Location: [address] [place] [address] [place]: [id number]    Mammo Breast Specimen (no Charge)    Result Date: 4/25/2018  Narrative: Mammo Breast Specimen No Charge: Right Breast - April 24, 2018 - Check In #: [de-identified] The image of the right breast specimen demonstrates the 2 localization wires and biopsy clip to be within the specimen as were indeterminate calcifications adjacent to the posterior wire  Localized biopsy clip and indeterminate calcifications within the right breast specimen  Transcription Location: [address] [place] [address] [place]: [id number]        Labs:   Lab Results   Component Value Date    WBC 7 34 04/12/2018    HGB 13 8 04/12/2018    HCT 41 7 04/12/2018    MCV 90 04/12/2018     04/12/2018     Lab Results   Component Value Date     04/12/2018    K 4 3 04/12/2018     04/12/2018    CO2 28 04/12/2018    ANIONGAP 8 04/12/2018    BUN 18 04/12/2018    CREATININE 0 76 04/12/2018    GLUCOSE 91 04/12/2018    CALCIUM 9 4 04/12/2018    AST 24 04/12/2018    ALT 38 04/12/2018    ALKPHOS 73 04/12/2018    PROT 7 7 04/12/2018    BILITOT 0 41 04/12/2018    EGFR 89 04/12/2018         Vital Sign:    Body surface area is 1 87 meters squared      Wt Readings from Last 3 Encounters:   05/21/18 78 5 kg (173 lb)   05/11/18 79 9 kg (176 lb 3 2 oz)   05/08/18 78 kg (172 lb)        Temp Readings from Last 3 Encounters:   05/21/18 98 5 °F (36 9 °C) (Tympanic)   05/11/18 98 2 °F (36 8 °C) (Temporal)   05/08/18 98 5 °F (36 9 °C)        BP Readings from Last 3 Encounters:   05/21/18 90/70   05/11/18 92/60   05/08/18 102/70         Pulse Readings from Last 3 Encounters:   05/21/18 77   05/11/18 67   05/08/18 60     @LASTSAO2(3)@    Active Problems:   Patient Active Problem List   Diagnosis    Malignant neoplasm of upper-outer quadrant of right breast in female, estrogen receptor positive ([facility] [address] )    Dense breast tissue       Past Medical History:   Past Medical History:   Diagnosis Date    Anxiety     Bilateral breast cysts     Breast tenderness     Depression     Frequent urination     Hashimoto's disease     Hypothyroidism     Night sweats     Palpitations     improved since lowered caffeine intake    Tinnitus     b/l    Viral syndrome        Surgical History:   Past Surgical History:   Procedure Laterality Date    BREAST BIOPSY Right 03 01/18    DCIS    BREAST BIOPSY Left 04/2018    benign    BREAST LUMPECTOMY Right 4/24/2018    Procedure: LUMPECTOMY BREAST NEEDLE LOCALIZED BRACKET;  Surgeon: Rut Crump MD;  [facility];  Service: Surgical Oncology    COLONOSCOPY      ESOPHAGOGASTRODUODENOSCOPY      LYMPH NODE BIOPSY Right 4/24/2018    Procedure: BIOPSY LYMPH NODE SENTINEL;  Surgeon: Rut Crump MD;  [facility];  Service: Surgical Oncology    WISDOM TOOTH EXTRACTION         Family History:    Family History   Problem Relation Age of Onset    Prostate cancer Paternal Uncle 64    No Known Problems Mother     Dementia Father     Heart disease Father        Cancer-related family history includes Prostate cancer (age of onset: 64) in her paternal uncle  Social History:   Social History     Social History    Marital status: /Civil Union     Spouse name: N/A    Number of children: N/A    Years of education: N/A     Occupational History    Not on file       Social History Main Topics    Smoking status: Never Smoker    Smokeless tobacco: Never Used    Alcohol use 4 2 oz/week     7 Standard drinks or equivalent per week      Comment: 7 -8 drinks a week    Drug use: No    Sexual activity: Not on file     Other Topics Concern    Not on file     Social History Narrative    No narrative on file       Current Medications:   Current Outpatient Prescriptions   Medication Sig Dispense Refill    levothyroxine 88 mcg tablet Take 1 tablet by mouth daily      Multiple Vitamin (MULTIVITAMIN) tablet Take 1 tablet by mouth daily      traMADol (ULTRAM) 50 mg tablet Take 1 tablet (50 mg total) by mouth every 6 (six) hours as needed for moderate pain 20 tablet 0     No current facility-administered medications for this visit  Allergies:    Allergies   Allergen Reactions    Clindamycin Other (See Comments)     Was painful to walk     Erythromycin Hives    Sulfamethoxazole-Trimethoprim Hives    Amoxicillin Rash

## 2018-06-06 ENCOUNTER — APPOINTMENT (OUTPATIENT)
Dept: RADIATION ONCOLOGY | Facility: CLINIC | Age: 53
End: 2018-06-06
Attending: RADIOLOGY
Payer: COMMERCIAL

## 2018-06-06 PROCEDURE — 77332 RADIATION TREATMENT AID(S): CPT | Performed by: RADIOLOGY

## 2018-06-06 PROCEDURE — 77290 THER RAD SIMULAJ FIELD CPLX: CPT | Performed by: RADIOLOGY

## 2018-06-15 PROCEDURE — 77334 RADIATION TREATMENT AID(S): CPT | Performed by: RADIOLOGY

## 2018-06-15 PROCEDURE — 77295 3-D RADIOTHERAPY PLAN: CPT | Performed by: RADIOLOGY

## 2018-06-15 PROCEDURE — 77300 RADIATION THERAPY DOSE PLAN: CPT | Performed by: RADIOLOGY

## 2018-06-18 PROCEDURE — 77417 THER RADIOLOGY PORT IMAGE(S): CPT | Performed by: RADIOLOGY

## 2018-06-18 PROCEDURE — 77280 THER RAD SIMULAJ FIELD SMPL: CPT | Performed by: RADIOLOGY

## 2018-06-19 PROCEDURE — 77412 RADIATION TX DELIVERY LVL 3: CPT | Performed by: RADIOLOGY

## 2018-06-20 ENCOUNTER — DOCUMENTATION (OUTPATIENT)
Dept: HEMATOLOGY ONCOLOGY | Facility: CLINIC | Age: 53
End: 2018-06-20

## 2018-06-20 DIAGNOSIS — C50.411 MALIGNANT NEOPLASM OF UPPER-OUTER QUADRANT OF RIGHT BREAST IN FEMALE, ESTROGEN RECEPTOR POSITIVE (HCC): Primary | ICD-10-CM

## 2018-06-20 DIAGNOSIS — Z17.0 MALIGNANT NEOPLASM OF UPPER-OUTER QUADRANT OF RIGHT BREAST IN FEMALE, ESTROGEN RECEPTOR POSITIVE (HCC): Primary | ICD-10-CM

## 2018-06-20 PROCEDURE — 77412 RADIATION TX DELIVERY LVL 3: CPT | Performed by: RADIOLOGY

## 2018-06-20 PROCEDURE — 77331 SPECIAL RADIATION DOSIMETRY: CPT | Performed by: RADIOLOGY

## 2018-06-20 NOTE — PROGRESS NOTES
Oncology Navigator Visit  Assessment:  Spoke to pt on phone for purpose of navigation outreach  Pt stated she recently started radiation treatment and it is so far going well  We discussed skin issues, sun exposure and protection, Also, discussed Anastrozole, side effects and ways to manage  All her questions were answered to her satisfaction  Pt encouraged to call for any questions/concerns  Pt has my contact information  Will outreach prn and as needed       Potential Barriers:    Care Coordination:    Communication/Education:    Cultural/Orthodox/Spiritual:    Health Promotion:    Insurance/Medical Costs:    Logistical:    Psychosocial/Distress/Behavioral:    Work/School:    Interventions:    Referrals:        Comments:

## 2018-06-21 PROCEDURE — 77412 RADIATION TX DELIVERY LVL 3: CPT | Performed by: RADIOLOGY

## 2018-06-22 PROCEDURE — 77412 RADIATION TX DELIVERY LVL 3: CPT | Performed by: RADIOLOGY

## 2018-06-25 PROCEDURE — 77336 RADIATION PHYSICS CONSULT: CPT | Performed by: RADIOLOGY

## 2018-06-25 PROCEDURE — 77412 RADIATION TX DELIVERY LVL 3: CPT | Performed by: RADIOLOGY

## 2018-06-25 PROCEDURE — 77417 THER RADIOLOGY PORT IMAGE(S): CPT | Performed by: RADIOLOGY

## 2018-06-26 PROCEDURE — 77412 RADIATION TX DELIVERY LVL 3: CPT | Performed by: RADIOLOGY

## 2018-06-27 ENCOUNTER — APPOINTMENT (OUTPATIENT)
Dept: LAB | Facility: CLINIC | Age: 53
End: 2018-06-27
Attending: RADIOLOGY
Payer: COMMERCIAL

## 2018-06-27 DIAGNOSIS — Z17.0 MALIGNANT NEOPLASM OF UPPER-OUTER QUADRANT OF RIGHT BREAST IN FEMALE, ESTROGEN RECEPTOR POSITIVE (HCC): ICD-10-CM

## 2018-06-27 DIAGNOSIS — C50.411 MALIGNANT NEOPLASM OF UPPER-OUTER QUADRANT OF RIGHT BREAST IN FEMALE, ESTROGEN RECEPTOR POSITIVE (HCC): ICD-10-CM

## 2018-06-27 LAB
ERYTHROCYTE [DISTWIDTH] IN BLOOD BY AUTOMATED COUNT: 13.7 % (ref 11.6–15.1)
GRANULOCYTES NFR BLD AUTO: 73.1 % (ref 47–80)
GRANULOCYTES NFR BLD: 7.3 THOUSAND/ΜL (ref 1.85–7.82)
HCT VFR BLD AUTO: 41.4 % (ref 34.8–46.1)
HGB BLD-MCNC: 13.5 G/DL (ref 11.5–15.4)
LYMPHOCYTES # BLD AUTO: 2.3 THOUSANDS/ΜL (ref 0.6–4.47)
LYMPHOCYTES NFR BLD AUTO: 23 % (ref 14–44)
MCH RBC QN AUTO: 29.9 PG (ref 26.8–34.3)
MCHC RBC AUTO-ENTMCNC: 32.6 G/DL (ref 31.4–37.4)
MCV RBC AUTO: 92 FL (ref 82–98)
MONOCYTES # BLD AUTO: 0.4 THOUSAND/ΜL (ref 0.17–1.22)
MONOCYTES NFR BLD AUTO: 4 % (ref 4–12)
PLATELET # BLD AUTO: 205 THOUSANDS/UL (ref 149–390)
PMV BLD AUTO: 8.2 FL (ref 8.9–12.7)
RBC # BLD AUTO: 4.5 MILLION/UL (ref 3.81–5.12)
WBC # BLD AUTO: 10 THOUSAND/UL (ref 4.31–10.16)
WBC NRBC COR # BLD: 10 THOUSAND/UL (ref 4.31–10.16)

## 2018-06-27 PROCEDURE — 77412 RADIATION TX DELIVERY LVL 3: CPT | Performed by: RADIOLOGY

## 2018-06-27 PROCEDURE — 85025 COMPLETE CBC W/AUTO DIFF WBC: CPT

## 2018-06-27 PROCEDURE — 36415 COLL VENOUS BLD VENIPUNCTURE: CPT

## 2018-06-28 PROCEDURE — 77412 RADIATION TX DELIVERY LVL 3: CPT | Performed by: RADIOLOGY

## 2018-06-29 PROCEDURE — 77412 RADIATION TX DELIVERY LVL 3: CPT | Performed by: RADIOLOGY

## 2018-07-02 ENCOUNTER — APPOINTMENT (OUTPATIENT)
Dept: RADIATION ONCOLOGY | Facility: CLINIC | Age: 53
End: 2018-07-02
Attending: RADIOLOGY
Payer: COMMERCIAL

## 2018-07-02 PROCEDURE — 77336 RADIATION PHYSICS CONSULT: CPT | Performed by: RADIOLOGY

## 2018-07-02 PROCEDURE — 77412 RADIATION TX DELIVERY LVL 3: CPT | Performed by: RADIOLOGY

## 2018-07-02 PROCEDURE — 77417 THER RADIOLOGY PORT IMAGE(S): CPT | Performed by: RADIOLOGY

## 2018-07-03 PROCEDURE — 77412 RADIATION TX DELIVERY LVL 3: CPT | Performed by: RADIOLOGY

## 2018-07-05 PROCEDURE — 77412 RADIATION TX DELIVERY LVL 3: CPT | Performed by: RADIOLOGY

## 2018-07-05 PROCEDURE — 77280 THER RAD SIMULAJ FIELD SMPL: CPT | Performed by: RADIOLOGY

## 2018-07-06 PROCEDURE — 77412 RADIATION TX DELIVERY LVL 3: CPT | Performed by: RADIOLOGY

## 2018-07-09 PROCEDURE — 77412 RADIATION TX DELIVERY LVL 3: CPT | Performed by: RADIOLOGY

## 2018-07-10 PROCEDURE — 77336 RADIATION PHYSICS CONSULT: CPT | Performed by: RADIOLOGY

## 2018-07-10 PROCEDURE — 77321 SPECIAL TELETX PORT PLAN: CPT | Performed by: RADIOLOGY

## 2018-07-10 PROCEDURE — 77412 RADIATION TX DELIVERY LVL 3: CPT | Performed by: RADIOLOGY

## 2018-07-10 PROCEDURE — 77334 RADIATION TREATMENT AID(S): CPT | Performed by: RADIOLOGY

## 2018-07-11 PROCEDURE — 77412 RADIATION TX DELIVERY LVL 3: CPT | Performed by: RADIOLOGY

## 2018-07-12 PROCEDURE — 77331 SPECIAL RADIATION DOSIMETRY: CPT | Performed by: RADIOLOGY

## 2018-07-12 PROCEDURE — 77280 THER RAD SIMULAJ FIELD SMPL: CPT | Performed by: RADIOLOGY

## 2018-07-12 PROCEDURE — 77412 RADIATION TX DELIVERY LVL 3: CPT | Performed by: RADIOLOGY

## 2018-07-13 PROCEDURE — 77412 RADIATION TX DELIVERY LVL 3: CPT | Performed by: RADIOLOGY

## 2018-07-16 PROCEDURE — 77412 RADIATION TX DELIVERY LVL 3: CPT | Performed by: RADIOLOGY

## 2018-07-17 PROCEDURE — 77412 RADIATION TX DELIVERY LVL 3: CPT | Performed by: RADIOLOGY

## 2018-07-17 PROCEDURE — 77336 RADIATION PHYSICS CONSULT: CPT | Performed by: RADIOLOGY

## 2018-07-18 PROCEDURE — 77412 RADIATION TX DELIVERY LVL 3: CPT | Performed by: RADIOLOGY

## 2018-08-07 ENCOUNTER — OFFICE VISIT (OUTPATIENT)
Dept: SURGICAL ONCOLOGY | Facility: CLINIC | Age: 53
End: 2018-08-07
Payer: COMMERCIAL

## 2018-08-07 VITALS
TEMPERATURE: 97.4 F | RESPIRATION RATE: 16 BRPM | DIASTOLIC BLOOD PRESSURE: 70 MMHG | HEIGHT: 65 IN | BODY MASS INDEX: 29.49 KG/M2 | SYSTOLIC BLOOD PRESSURE: 110 MMHG | HEART RATE: 73 BPM | WEIGHT: 177 LBS

## 2018-08-07 DIAGNOSIS — Z92.3 HISTORY OF THERAPEUTIC RADIATION: ICD-10-CM

## 2018-08-07 DIAGNOSIS — R92.8 ABNORMAL MRI, BREAST: ICD-10-CM

## 2018-08-07 DIAGNOSIS — Z79.811 USE OF ANASTROZOLE: ICD-10-CM

## 2018-08-07 DIAGNOSIS — C50.411 MALIGNANT NEOPLASM OF UPPER-OUTER QUADRANT OF RIGHT BREAST IN FEMALE, ESTROGEN RECEPTOR POSITIVE (HCC): ICD-10-CM

## 2018-08-07 DIAGNOSIS — Z17.0 MALIGNANT NEOPLASM OF UPPER-OUTER QUADRANT OF RIGHT BREAST IN FEMALE, ESTROGEN RECEPTOR POSITIVE (HCC): ICD-10-CM

## 2018-08-07 DIAGNOSIS — R92.2 DENSE BREAST TISSUE: Primary | ICD-10-CM

## 2018-08-07 PROBLEM — N60.02 BILATERAL BREAST CYSTS: Status: RESOLVED | Noted: 2018-08-07 | Resolved: 2018-08-07

## 2018-08-07 PROBLEM — N60.01 BILATERAL BREAST CYSTS: Status: RESOLVED | Noted: 2018-08-07 | Resolved: 2018-08-07

## 2018-08-07 PROCEDURE — 99214 OFFICE O/P EST MOD 30 MIN: CPT | Performed by: SURGERY

## 2018-08-07 NOTE — PROGRESS NOTES
Surgical Oncology Follow Up       [address]  CANCER CARE [facility] SURGICAL ONCOLOGY [place] [address]    Phillip Dickey  1965  [id number]  [address]  CANCER [facility] SURGICAL ONCOLOGY [place]  [place]    Chief Complaint   Patient presents with    Breast Cancer     Pt is herefor 3 month follow up        Assessment/Plan   Diagnoses and all orders for this visit:    Dense breast tissue    Malignant neoplasm of upper-outer quadrant of right breast in female, estrogen receptor positive ([facility] [address] )  -     MRI breast bilateral w wo contrast; Future  -     Mammo diagnostic bilateral w 3d & cad; Future    History of therapeutic radiation    Use of anastrozole    Abnormal MRI, breast  -     MRI breast bilateral w wo contrast; Future        Advance Care Planning/Advance Directives:  Did not discuss  with the patient  Oncology History:       Malignant neoplasm of upper-outer quadrant of right breast in female, estrogen receptor positive ([facility] [address] )    3/1/2018 Initial Diagnosis     Malignant neoplasm of upper-outer quadrant of right breast in female, estrogen receptor positive ([facility] [address] )         3/1/2018 Biopsy     RIGHT breast biopsy:  ER/DE negative, HER2 by IHC 3+ positive  Microinvasive carcinoma arising in extensive high-grade ductal carcinoma in-situ (DCIS), grade            4/24/2018 Surgery     LUMPECTOMY BREAST NEEDLE LOCALIZED BRACKET (Right)  BIOPSY LYMPH NODE SENTINEL (Right) - Dr Loco Smith         4/24/2018 -  Cancer Staged     Stage IA - pT1a, pN0(sn), cM0, G2, ER 90-95% positive, DE <1% negative, HER2 by IHC 2+ equivocal; FISH pending  4 mm IDC is largest focus-2 additional 0 8mm foci and extensive DCIS  No LVI  Extensive LCIS    Margins negative for invasive and in situ  Lymph node assessment/status 0/3               History of Present Illness: breast cancer follow up  -Interval History: none    Review of Systems:  Review of Systems Constitutional: Positive for unexpected weight change (4 lbs)  Negative for appetite change and fever  Eyes: Negative  Respiratory: Negative for shortness of breath  Cardiovascular: Negative  Gastrointestinal: Negative  Endocrine: Negative  Genitourinary: Negative  Musculoskeletal: Negative  Negative for arthralgias and myalgias  Skin: Negative  Radiation changes   Allergic/Immunologic: Negative  Neurological: Negative  Hematological: Negative  Negative for adenopathy  Does not bruise/bleed easily     Psychiatric/Behavioral:        Irritability       Patient Active Problem List   Diagnosis    Malignant neoplasm of upper-outer quadrant of right breast in female, estrogen receptor positive ([address] )    Dense breast tissue    History of therapeutic radiation    Use of anastrozole     Past Medical History:   Diagnosis Date    Anxiety     Bilateral breast cysts     Depression     Frequent urination     Hashimoto's disease     Hypothyroidism     Night sweats     Palpitations     improved since lowered caffeine intake    Tinnitus     b/l    Viral syndrome      Past Surgical History:   Procedure Laterality Date    BREAST BIOPSY Right 03 01/18    DCIS    BREAST BIOPSY Left 04/2018    benign    BREAST LUMPECTOMY Right 4/24/2018    Procedure: LUMPECTOMY BREAST NEEDLE LOCALIZED BRACKET;  Surgeon: Creig Halsted, MD;  [facility];  Service: Surgical Oncology    COLONOSCOPY      ESOPHAGOGASTRODUODENOSCOPY      LYMPH NODE BIOPSY Right 4/24/2018    Procedure: BIOPSY LYMPH NODE SENTINEL;  Surgeon: Creig Halsted, MD;  [facility];  Service: Surgical Oncology    WISDOM TOOTH EXTRACTION       Family History   Problem Relation Age of Onset    Prostate cancer Paternal Uncle 64    No Known Problems Mother     Dementia Father     Heart disease Father      Social History     Social History    Marital status: /Civil Union     Spouse name: N/A    Number of children: N/A    Years of education: N/A     Occupational History    Not on file  Social History Main Topics    Smoking status: Never Smoker    Smokeless tobacco: Never Used    Alcohol use 4 2 oz/week     7 Standard drinks or equivalent per week      Comment: 7 -8 drinks a week    Drug use: No    Sexual activity: Not on file     Other Topics Concern    Not on file     Social History Narrative    No narrative on file       Current Outpatient Prescriptions:     anastrozole (ARIMIDEX) 1 mg tablet, Take 1 tablet (1 mg total) by mouth daily, Disp: 90 tablet, Rfl: 1    Calcium Carb-Cholecalciferol (CALCIUM+D3 PO), Take 1 tablet by mouth every other day, Disp: , Rfl:     levothyroxine 88 mcg tablet, Take 1 tablet by mouth daily, Disp: , Rfl:     Multiple Vitamin (MULTIVITAMIN) tablet, Take 1 tablet by mouth daily, Disp: , Rfl:     Pseudoeph-Doxylamine-DM-APAP (NYQUIL PO), Take by mouth, Disp: , Rfl:     traMADol (ULTRAM) 50 mg tablet, Take 1 tablet (50 mg total) by mouth every 6 (six) hours as needed for moderate pain, Disp: 20 tablet, Rfl: 0  Allergies   Allergen Reactions    Clindamycin Other (See Comments)     Was painful to walk     Erythromycin Hives    Sulfamethoxazole-Trimethoprim Hives    Amoxicillin Rash       The following portions of the patient's history were reviewed and updated as appropriate: allergies, current medications, past family history, past medical history, past social history, past surgical history and problem list         Vitals:    08/07/18 0836   BP: 110/70   Pulse: 73   Resp: 16   Temp: (!) 97 4 °F (36 3 °C)       Physical Exam   Constitutional: She is oriented to person, place, and time  She appears well-developed and well-nourished  HENT:   Head: Normocephalic and atraumatic  Cardiovascular: Normal heart sounds  Pulmonary/Chest: Breath sounds normal  Right breast exhibits skin change (lumpectomy scar and radiation changes) and tenderness   Right breast exhibits no inverted nipple, no mass and no nipple discharge  Left breast exhibits no inverted nipple, no mass, no nipple discharge, no skin change and no tenderness  Breasts are asymmetrical (L>R)  Abdominal: Soft  Lymphadenopathy:        Right axillary: No pectoral and no lateral adenopathy present  Left axillary: No pectoral and no lateral adenopathy present  Right: No supraclavicular adenopathy present  Left: No supraclavicular adenopathy present  Neurological: She is alert and oriented to person, place, and time  Psychiatric: She has a normal mood and affect  Discussion/Summary:  57-year-old female status post right breast conservation for a stage IA carcinoma  She is currently on anastrozole  She did complete whole breast radiation mid July  There is no evidence of disease based on examination today  I did instruct her to do massage to the lumpectomy bed  She is due for a follow-up MRI in September  I will push this out an additional month secondary to the continued healing of the breast from radiation therapy  She is tender on examination today and may not tolerate this well at the present time  She will also be due for her mammogram in February  I will make these arrangements for her as well  I will see her again in six months or sooner should the need arise

## 2018-08-15 ENCOUNTER — CLINICAL SUPPORT (OUTPATIENT)
Dept: RADIATION ONCOLOGY | Facility: CLINIC | Age: 53
End: 2018-08-15
Payer: COMMERCIAL

## 2018-08-15 ENCOUNTER — RADIATION ONCOLOGY FOLLOW-UP (OUTPATIENT)
Dept: RADIATION ONCOLOGY | Facility: CLINIC | Age: 53
End: 2018-08-15
Attending: RADIOLOGY
Payer: COMMERCIAL

## 2018-08-15 VITALS
BODY MASS INDEX: 28.32 KG/M2 | RESPIRATION RATE: 16 BRPM | TEMPERATURE: 97.5 F | SYSTOLIC BLOOD PRESSURE: 96 MMHG | DIASTOLIC BLOOD PRESSURE: 60 MMHG | OXYGEN SATURATION: 95 % | HEART RATE: 68 BPM | WEIGHT: 176.2 LBS | HEIGHT: 66 IN

## 2018-08-15 DIAGNOSIS — Z17.0 MALIGNANT NEOPLASM OF UPPER-OUTER QUADRANT OF RIGHT BREAST IN FEMALE, ESTROGEN RECEPTOR POSITIVE (HCC): Primary | ICD-10-CM

## 2018-08-15 DIAGNOSIS — C50.411 MALIGNANT NEOPLASM OF UPPER-OUTER QUADRANT OF RIGHT BREAST IN FEMALE, ESTROGEN RECEPTOR POSITIVE (HCC): Primary | ICD-10-CM

## 2018-08-15 PROCEDURE — 99215 OFFICE O/P EST HI 40 MIN: CPT | Performed by: RADIOLOGY

## 2018-08-15 PROCEDURE — G0463 HOSPITAL OUTPT CLINIC VISIT: HCPCS | Performed by: RADIOLOGY

## 2018-08-15 PROCEDURE — 99213 OFFICE O/P EST LOW 20 MIN: CPT | Performed by: RADIOLOGY

## 2018-08-15 NOTE — PROGRESS NOTES
Latia Minus  1965   Ms  Ashleyniya Correia is a 52 y o  female       Chief Complaint   Patient presents with    Follow-up     radiation oncology     Assessment:  Stage IA invasive right breast carcinoma status post breast conservation surgery followed by adjuvant breast irradiation which she completed about 4 weeks ago  Plan:  Follow-up in 6 months  Discussion and summary:  We discussed at length with patient the importance of doing breast massage to prevent edema and muscle pains  This should be done daily at least for 6 months  Physical examination:  There are no palpable nodes  Lungs are clear  Heart tones are normal with regular rate and rhythm  No abdominal masses  Breast examination reveals no breast masses with the left breast is normal and some tenderness over the treated right breast also mild fibrosis around the lumpectomy cavity        Cancer Staging  Malignant neoplasm of upper-outer quadrant of right breast in female, estrogen receptor positive ([facility] [address] )  Staging form: Breast, AJCC 8th Edition  - Clinical: cT1mi, cN0, cM0 - Signed by Grecia Ann MD on 3/14/2018  - Pathologic: Stage IA (pT1a, pN0(sn), cM0, G2, ER: Positive, WV: Negative, HER2: Equivocal) - Signed by Grecia Ann MD on 5/8/2018         Malignant neoplasm of upper-outer quadrant of right breast in female, estrogen receptor positive ([facility] [address] )    3/1/2018 Initial Diagnosis     Malignant neoplasm of upper-outer quadrant of right breast in female, estrogen receptor positive ([facility] [address] )         3/1/2018 Biopsy     RIGHT breast biopsy:  ER/WV negative, HER2 by IHC 3+ positive  Microinvasive carcinoma arising in extensive high-grade ductal carcinoma in-situ (DCIS), grade            4/24/2018 Surgery     LUMPECTOMY BREAST NEEDLE LOCALIZED BRACKET (Right)  BIOPSY LYMPH NODE SENTINEL (Right) - Dr Kavon Correia         4/24/2018 -  Cancer Staged     Stage IA - pT1a, pN0(sn), cM0, G2, ER 90-95% positive, WV <1% negative, HER2 by IHC 2+ equivocal; FISH pending  4 mm IDC is largest focus-2 additional 0 8mm foci and extensive DCIS  No LVI  Extensive LCIS  Margins negative for invasive and in situ  Lymph node assessment/status 0/3            6/19/2018 - 7/18/2018 Radiation     Right breast to 4256 cGy in 16 fractions followed by a boost to the lumpectomy cavity of an additional 1000 cGy  Dr Abdiel Nowak Therapy     anastrozole            Clinical Trial: no      Interval History:  Neil Bansal returns today for her first follow up s/p completing radiation therapy to her right breast on 7/18/18 8/7/18 Follow up with Dr Gómez Pastrana  Recommended massage to the lumpectomy bed   and healing      Occasional right breast discomfort  She recently started breast massage  Fatigue improving  Taking anastrozole, states she thought in the beginning she was feeling a little depressed but this has become less and now feels this may be due stress at work  Future Appointments:  9/28/18 Dr Virginia Goss  10/2/18 Bilateral breast MRI   2/7/19 Mammogram  2/13/18 Dr Gómez Pastrana        Screening  Tobacco  Current tobacco user: no  If yes, brief counseling provided: NA    Hypertension  Hypertension screening performed: yes  Normotensive:  yes  If no, referred to PCP: n/a    Depression Screening  Screened for depression using PHQ-2: yes    Screened for depression using PHQ-9:  no  Screening positive or negative:  negative  If score >4, was any of the following actions taken?    Additional evaluation for depression, suicide risk assesment, referral to PCP or psychiatry, medication started:  [id number] [facility]65 [facility]  Advanced Care Planning Discussed:  n/a  Patient named surrogate decision maker or care plan in chart: n/a    [unfilled]  Health Maintenance   Topic Date Due    HIV SCREENING  1965    Hepatitis C Screening  1965    PNEUMOCOCCAL POLYSACCHARIDE VACCINE X 2 AGE 11-64 YEARS IMMUNOCOMPROMISED (1) 07/21/1974    DTaP,Tdap,and Td Vaccines (1 - Tdap) 07/21/1984    INFLUENZA VACCINE  09/01/2018    Depression Screening PHQ-9  05/11/2019    CRC Screening: Colonoscopy  11/11/2023       Patient Active Problem List   Diagnosis    Malignant neoplasm of upper-outer quadrant of right breast in female, estrogen receptor positive ([facility] [address] )    Dense breast tissue    History of therapeutic radiation    Use of anastrozole    Abnormal MRI, breast     Past Medical History:   Diagnosis Date    Anxiety     Bilateral breast cysts     Breast cancer ([facility] [address] )     Depression     Frequent urination     Hashimoto's disease     History of external beam radiation therapy     6/19/18-7/19/18 Right breast    Hypothyroidism     Night sweats     Palpitations     improved since lowered caffeine intake    Tinnitus     b/l    Viral syndrome      Past Surgical History:   Procedure Laterality Date    BREAST BIOPSY Right 03 01/18    DCIS    BREAST BIOPSY Left 04/2018    benign    BREAST LUMPECTOMY Right 4/24/2018    Procedure: LUMPECTOMY BREAST NEEDLE LOCALIZED BRACKET;  Surgeon: Yaw Subramanian MD;  Location: [facility];  Service: Surgical Oncology    COLONOSCOPY      ESOPHAGOGASTRODUODENOSCOPY      LYMPH NODE BIOPSY Right 4/24/2018    Procedure: BIOPSY LYMPH NODE SENTINEL;  Surgeon: Yaw Subramanian MD;  Location: [facility];  Service: Surgical Oncology    WISDOM TOOTH EXTRACTION       Family History   Problem Relation Age of Onset    Prostate cancer Paternal Uncle 64    No Known Problems Mother     Dementia Father     Heart disease Father      Social History     Social History    Marital status: /Civil Union     Spouse name: N/A    Number of children: N/A    Years of education: N/A     Occupational History    Not on file       Social History Main Topics    Smoking status: Never Smoker    Smokeless tobacco: Never Used    Alcohol use 4 2 oz/week     7 Standard drinks or equivalent per week      Comment: 7 -8 drinks a week    Drug use: No    Sexual activity: Not on file     Other Topics Concern    Not on file     Social History Narrative    No narrative on file       Current Outpatient Prescriptions:     anastrozole (ARIMIDEX) 1 mg tablet, Take 1 tablet (1 mg total) by mouth daily, Disp: 90 tablet, Rfl: 1    Calcium Carb-Cholecalciferol (CALCIUM+D3 PO), Take 1 tablet by mouth every other day, Disp: , Rfl:     levothyroxine 88 mcg tablet, Take 1 tablet by mouth daily, Disp: , Rfl:     Multiple Vitamin (MULTIVITAMIN) tablet, Take 1 tablet by mouth daily, Disp: , Rfl:   Allergies   Allergen Reactions    Clindamycin Other (See Comments)     Was painful to walk     Erythromycin Hives    Sulfamethoxazole-Trimethoprim Hives    Amoxicillin Rash       Review of Systems:  Review of Systems   Constitutional: Positive for fatigue (improving)  HENT: Negative  Eyes: Negative  Respiratory: Negative  Cardiovascular: Negative  Gastrointestinal: Negative  Endocrine: Negative  Genitourinary: Positive for frequency and urgency (sometimes)  Musculoskeletal: Negative  Skin: Positive for color change (faint pinkness to right breast)  Allergic/Immunologic: Negative  Neurological: Positive for dizziness (occasionally when gets up quickly)  Hematological: Negative  Psychiatric/Behavioral: Negative  Vitals:    08/15/18 1504   BP: 96/60   Pulse: 68   Resp: 16   Temp: 97 5 °F (36 4 °C)   TempSrc: Temporal   SpO2: 95%   Weight: 79 9 kg (176 lb 3 2 oz)   Height: 5' 5 75" (1 67 m)            Imaging:No results found      Teaching: reviewed breast massage

## 2018-09-28 ENCOUNTER — OFFICE VISIT (OUTPATIENT)
Dept: HEMATOLOGY ONCOLOGY | Facility: CLINIC | Age: 53
End: 2018-09-28
Payer: COMMERCIAL

## 2018-09-28 VITALS
WEIGHT: 177 LBS | DIASTOLIC BLOOD PRESSURE: 72 MMHG | RESPIRATION RATE: 16 BRPM | HEIGHT: 66 IN | BODY MASS INDEX: 28.45 KG/M2 | SYSTOLIC BLOOD PRESSURE: 110 MMHG | OXYGEN SATURATION: 98 % | HEART RATE: 74 BPM | TEMPERATURE: 97.8 F

## 2018-09-28 DIAGNOSIS — C50.411 MALIGNANT NEOPLASM OF UPPER-OUTER QUADRANT OF RIGHT BREAST IN FEMALE, ESTROGEN RECEPTOR POSITIVE (HCC): Primary | ICD-10-CM

## 2018-09-28 DIAGNOSIS — Z17.0 MALIGNANT NEOPLASM OF UPPER-OUTER QUADRANT OF RIGHT BREAST IN FEMALE, ESTROGEN RECEPTOR POSITIVE (HCC): Primary | ICD-10-CM

## 2018-09-28 PROCEDURE — 99214 OFFICE O/P EST MOD 30 MIN: CPT | Performed by: INTERNAL MEDICINE

## 2018-09-28 RX ORDER — ANASTROZOLE 1 MG/1
1 TABLET ORAL DAILY
Qty: 90 TABLET | Refills: 0 | Status: SHIPPED | OUTPATIENT
Start: 2018-09-28 | End: 2019-04-18 | Stop reason: SDUPTHER

## 2018-09-28 NOTE — PROGRESS NOTES
Hematology / Oncology Outpatient Follow Up Note    Vanessa Vuong 52 y o  female :1965 [id number]         Date:  2018    Assessment / Plan:  A 58-year-old postmenopausal woman with newly diagnosed stage IA right breast cancer, grade 2, ER 90% positive, HI negative, HER2 equivocal disease  Interestingly enough, initial biopsy which showed micro invasion was ER negative, HI negative HER2 3+ disease  She underwent lumpectomy with sentinel lymph node biopsy, resulting in FILEMON  Her tumor size was 4 mm  She is currently on adjuvant hormonal therapy with anastrozole with no significant toxicity  Clinically, she has no evidence recurrent disease  I recommended her to continue with anastrozole 1 mg once a day  She is in agreement with my recommendation  I will see her again in 6 months for routine follow-up                           Subjective:      HPI:  A 58-year-old postmenopausal woman who had last menstrual cycle at age of 44 was found to have mammographic abnormality in her right breast   Therefore, she underwent right breast biopsy in 2018 which showed micro invasive carcinoma with extensive DCIS  Micro invasive carcinoma was ER negative, HI negative, HER2 3+ disease  Subsequently, she underwent lumpectomy with sentinel lymph node biopsy by Dr Sherley Borrego in 2018  She had 4 x 3 mm of invasive ductal carcinoma, grade 2  Lymphovascular invasion was not seen  One sentinel lymph node was negative for metastatic disease  This was ER 90% positive, HI negative, HER2 2+ disease  HER2 fish was equivocal with ratio of 1 4  She presents today to discuss adjuvant treatment options  She has no complaint of pain  However, since last week, she has noticed some erythematous skin change at the lumpectomy site  There is no fever, chills or night sweats  She denied any respiratory symptoms  She only has hypothyroidism as past medical history  She has no history of major surgery    She denied family history of breast or ovarian cancer  Her performance status is normal            Interval History:  A 52year-old postmenopausal woman with stage IA right breast cancer, grade 2, ER 90% positive, CA negative, HER2 equivocal disease  Interestingly enough, initial biopsy which showed micro invasion was ER negative, CA negative HER2 3+ disease  She underwent lumpectomy with sentinel lymph node biopsy, resulting in FILEMON  Her tumor size was 4 mm  We had extensive discussion regarding trastuzumab, possible adjuvant chemotherapy as well as anastrozole monotherapy  She chose to be on anastrozole monotherapy, which she has been on since May 2018  She came in today for routine follow-up  She initially had somewhat depressed mood on anastrozole which later improved  She has no complaint hot flashes or musculoskeletal symptoms  She finished adjuvant radiation therapy with minimal skin toxicity  She feels well  She has no complaint of pain  She has stable weight  Her performance status is normal            Objective:      Primary Diagnosis:     1    Right breast cancer, stage IA  (pT1a, pN0, M0) grade 2, ER 90% positive, CA negative, HER2 fish equivocal  (initial biopsy report micro invasive cancer, ER negative, CA negative, her 2 3+ disease )  Diagnosed in April 2018      Cancer Staging:  Cancer Staging  Malignant neoplasm of upper-outer quadrant of right breast in female, estrogen receptor positive ([facility] [address] )  Staging form: Breast, AJCC 8th Edition  - Clinical: cT1mi, cN0, cM0 - Signed by Melinda Valentino MD on 3/14/2018  - Pathologic: Stage IA (pT1a, pN0(sn), cM0, G2, ER: Positive, CA: Negative, HER2: Equivocal) - Signed by Melinda Valentino MD on 5/8/2018           Previous Hematologic/ Oncologic Treatment:            Current Hematologic/ Oncologic Treatment:       Adjuvant hormonal therapy with anastrozole since June 2018      Disease Status:      FILEMON status post lumpectomy with sentinel lymph node biopsy      Test Results:     Pathology:     Initial biopsy showed micro invasive cancer with extensive DCIS  Micro invasive part was ER negative, CO negative, her 2 3+ disease        Lumpectomy showed 4 x 3 mm of invasive ductal carcinoma, grade 2  No evidence of lymphovascular invasion  1 sentinel lymph node was negative for metastatic disease  This was ER 90 -95 % positive, CO negative, HER2  2+ disease  HER2 fish was equivocal with ratio of 1 4  Stage IA (pT1a, pN0, M0)     Radiology:     Chest x-ray was negative for pulmonary disease      Laboratory:     CBC and CMP are within normal limits      Physical Exam:        General Appearance:    Alert, oriented          Eyes:    PERRL   Ears:    Normal external ear canals, both ears   Nose:   Nares normal, septum midline   Throat:   Mucosa moist  Pharynx without injection  Neck:   Supple         Lungs:     Clear to auscultation bilaterally   Chest Wall:    No tenderness or deformity    Heart:    Regular rate and rhythm         Abdomen:     Soft, non-tender, bowel sounds +, no organomegaly               Extremities:   Extremities no cyanosis or edema         Skin:   no rash or icterus  Lymph nodes:   Cervical, supraclavicular, and axillary nodes normal   Neurologic:   CNII-XII intact, normal strength, sensation and reflexes     Throughout             Breast exam: Lumpectomy scar at lateral aspect of her right breast with minor erythematous skin change  No palpable abnormality  Left breast exam is negative  ROS: Review of Systems   All other systems reviewed and are negative  Imaging: No results found        Labs:   Lab Results   Component Value Date    WBC 10 00 06/27/2018    HGB 13 5 06/27/2018    HCT 41 4 06/27/2018    MCV 92 06/27/2018     06/27/2018     Lab Results   Component Value Date     04/12/2018    K 4 3 04/12/2018     04/12/2018    CO2 28 04/12/2018    BUN 18 04/12/2018    CREATININE 0 76 04/12/2018 CALCIUM 9 4 04/12/2018    AST 24 04/12/2018    ALT 38 04/12/2018    ALKPHOS 73 04/12/2018    EGFR 89 04/12/2018         Current Medications: Reviewed  Allergies: Reviewed  PMH/FH/SH:  Reviewed      Vital Sign:    Body surface area is 1 89 meters squared      Wt Readings from Last 3 Encounters:   09/28/18 80 3 kg (177 lb)   08/15/18 79 9 kg (176 lb 3 2 oz)   08/07/18 80 3 kg (177 lb)        Temp Readings from Last 3 Encounters:   09/28/18 97 8 °F (36 6 °C) (Tympanic)   08/15/18 97 5 °F (36 4 °C) (Temporal)   08/07/18 (!) 97 4 °F (36 3 °C)        BP Readings from Last 3 Encounters:   09/28/18 110/72   08/15/18 96/60   08/07/18 110/70         Pulse Readings from Last 3 Encounters:   09/28/18 74   08/15/18 68   08/07/18 73     @LASTSAO2(3)@

## 2018-10-02 ENCOUNTER — HOSPITAL ENCOUNTER (OUTPATIENT)
Dept: RADIOLOGY | Facility: HOSPITAL | Age: 53
Discharge: HOME | End: 2018-10-02
Attending: SURGERY
Payer: COMMERCIAL

## 2018-10-02 DIAGNOSIS — R92.8 ABNORMAL MRI, BREAST: ICD-10-CM

## 2018-10-02 DIAGNOSIS — C50.411 MALIGNANT NEOPLASM OF UPPER-OUTER QUADRANT OF RIGHT BREAST IN FEMALE, ESTROGEN RECEPTOR POSITIVE (HCC): ICD-10-CM

## 2018-10-02 DIAGNOSIS — Z17.0 MALIGNANT NEOPLASM OF UPPER-OUTER QUADRANT OF RIGHT BREAST IN FEMALE, ESTROGEN RECEPTOR POSITIVE (HCC): ICD-10-CM

## 2018-10-02 PROCEDURE — A9585 GADOBUTROL INJECTION: HCPCS | Performed by: SURGERY

## 2018-10-02 PROCEDURE — C8908 MRI W/O FOL W/CONT, BREAST,: HCPCS

## 2018-10-02 PROCEDURE — 0159T HB CAD BREAST MRI: CPT

## 2018-10-02 RX ADMIN — GADOBUTROL 8 ML: 604.72 INJECTION INTRAVENOUS at 17:53

## 2018-11-02 DIAGNOSIS — Z17.0 MALIGNANT NEOPLASM OF UPPER-OUTER QUADRANT OF RIGHT BREAST IN FEMALE, ESTROGEN RECEPTOR POSITIVE (HCC): ICD-10-CM

## 2018-11-02 DIAGNOSIS — C50.411 MALIGNANT NEOPLASM OF UPPER-OUTER QUADRANT OF RIGHT BREAST IN FEMALE, ESTROGEN RECEPTOR POSITIVE (HCC): ICD-10-CM

## 2018-11-02 RX ORDER — ANASTROZOLE 1 MG/1
TABLET ORAL
Qty: 90 TABLET | Refills: 0 | Status: SHIPPED | OUTPATIENT
Start: 2018-11-02 | End: 2019-02-10 | Stop reason: SDUPTHER

## 2019-02-07 ENCOUNTER — HOSPITAL ENCOUNTER (OUTPATIENT)
Dept: MAMMOGRAPHY | Facility: CLINIC | Age: 54
Discharge: HOME | End: 2019-02-07
Payer: COMMERCIAL

## 2019-02-07 VITALS — WEIGHT: 177 LBS | HEIGHT: 66 IN | BODY MASS INDEX: 28.45 KG/M2

## 2019-02-07 DIAGNOSIS — Z17.0 MALIGNANT NEOPLASM OF UPPER-OUTER QUADRANT OF RIGHT BREAST IN FEMALE, ESTROGEN RECEPTOR POSITIVE (HCC): ICD-10-CM

## 2019-02-07 DIAGNOSIS — C50.411 MALIGNANT NEOPLASM OF UPPER-OUTER QUADRANT OF RIGHT BREAST IN FEMALE, ESTROGEN RECEPTOR POSITIVE (HCC): ICD-10-CM

## 2019-02-07 PROCEDURE — 77066 DX MAMMO INCL CAD BI: CPT

## 2019-02-07 PROCEDURE — G0279 TOMOSYNTHESIS, MAMMO: HCPCS

## 2019-02-10 DIAGNOSIS — C50.411 MALIGNANT NEOPLASM OF UPPER-OUTER QUADRANT OF RIGHT BREAST IN FEMALE, ESTROGEN RECEPTOR POSITIVE (HCC): ICD-10-CM

## 2019-02-10 DIAGNOSIS — Z17.0 MALIGNANT NEOPLASM OF UPPER-OUTER QUADRANT OF RIGHT BREAST IN FEMALE, ESTROGEN RECEPTOR POSITIVE (HCC): ICD-10-CM

## 2019-02-11 RX ORDER — ANASTROZOLE 1 MG/1
TABLET ORAL
Qty: 90 TABLET | Refills: 0 | Status: SHIPPED | OUTPATIENT
Start: 2019-02-11 | End: 2019-02-13 | Stop reason: HOSPADM

## 2019-02-13 ENCOUNTER — OFFICE VISIT (OUTPATIENT)
Dept: SURGICAL ONCOLOGY | Facility: CLINIC | Age: 54
End: 2019-02-13
Payer: COMMERCIAL

## 2019-02-13 ENCOUNTER — RADIATION ONCOLOGY FOLLOW-UP (OUTPATIENT)
Dept: RADIATION ONCOLOGY | Facility: CLINIC | Age: 54
End: 2019-02-13
Attending: RADIOLOGY
Payer: COMMERCIAL

## 2019-02-13 ENCOUNTER — CLINICAL SUPPORT (OUTPATIENT)
Dept: RADIATION ONCOLOGY | Facility: CLINIC | Age: 54
End: 2019-02-13
Attending: RADIOLOGY

## 2019-02-13 VITALS
HEIGHT: 66 IN | WEIGHT: 181 LBS | HEART RATE: 64 BPM | TEMPERATURE: 97.5 F | BODY MASS INDEX: 29.09 KG/M2 | SYSTOLIC BLOOD PRESSURE: 110 MMHG | RESPIRATION RATE: 14 BRPM | DIASTOLIC BLOOD PRESSURE: 70 MMHG

## 2019-02-13 VITALS
DIASTOLIC BLOOD PRESSURE: 64 MMHG | SYSTOLIC BLOOD PRESSURE: 98 MMHG | WEIGHT: 181.6 LBS | TEMPERATURE: 98.4 F | HEIGHT: 66 IN | RESPIRATION RATE: 16 BRPM | HEART RATE: 61 BPM | OXYGEN SATURATION: 97 % | BODY MASS INDEX: 29.18 KG/M2

## 2019-02-13 DIAGNOSIS — Z79.811 USE OF ANASTROZOLE: ICD-10-CM

## 2019-02-13 DIAGNOSIS — C50.411 MALIGNANT NEOPLASM OF UPPER-OUTER QUADRANT OF RIGHT BREAST IN FEMALE, ESTROGEN RECEPTOR POSITIVE (HCC): Primary | ICD-10-CM

## 2019-02-13 DIAGNOSIS — R92.2 DENSE BREAST TISSUE: ICD-10-CM

## 2019-02-13 DIAGNOSIS — Z17.0 MALIGNANT NEOPLASM OF UPPER-OUTER QUADRANT OF RIGHT BREAST IN FEMALE, ESTROGEN RECEPTOR POSITIVE (HCC): Primary | ICD-10-CM

## 2019-02-13 DIAGNOSIS — R92.8 ABNORMAL MRI, BREAST: ICD-10-CM

## 2019-02-13 PROBLEM — Z92.3 HISTORY OF THERAPEUTIC RADIATION: Status: RESOLVED | Noted: 2018-08-07 | Resolved: 2019-02-13

## 2019-02-13 PROCEDURE — 99215 OFFICE O/P EST HI 40 MIN: CPT | Performed by: RADIOLOGY

## 2019-02-13 PROCEDURE — G0463 HOSPITAL OUTPT CLINIC VISIT: HCPCS | Performed by: RADIOLOGY

## 2019-02-13 PROCEDURE — 99214 OFFICE O/P EST MOD 30 MIN: CPT | Performed by: SURGERY

## 2019-02-13 NOTE — PROGRESS NOTES
Tiffanie Boland  1965   Ms Emily Diaz is a 52 y o  female       Chief Complaint   Patient presents with    Follow-up     radiation oncology       Cancer Staging  Malignant neoplasm of upper-outer quadrant of right breast in female, estrogen receptor positive ([facility] [address] )  Staging form: Breast, AJCC 8th Edition  - Clinical: cT1mi, cN0, cM0 - Signed by Jaylen Yañez MD on 3/14/2018  - Pathologic: Stage IA (pT1a, pN0(sn), cM0, G2, ER: Positive, UT: Negative, HER2: Equivocal) - Signed by Jaylen Yañez MD on 5/8/2018      Pt had Stage IA invasive right breast carcinoma status post breast conservation surgery followed by adjuvant breast irradiation which she completed 7/18/18  Malignant neoplasm of upper-outer quadrant of right breast in female, estrogen receptor positive ([facility] [address] )    3/1/2018 Initial Diagnosis     Malignant neoplasm of upper-outer quadrant of right breast in female, estrogen receptor positive ([facility] [address] )         3/1/2018 Biopsy     RIGHT breast biopsy:  ER/UT negative, HER2 by IHC 3+ positive  Microinvasive carcinoma arising in extensive high-grade ductal carcinoma in-situ (DCIS), grade            4/24/2018 Surgery     LUMPECTOMY BREAST NEEDLE LOCALIZED BRACKET (Right)  BIOPSY LYMPH NODE SENTINEL (Right) - Dr Emily Diaz         4/24/2018 -  Cancer Staged     Stage IA - pT1a, pN0(sn), cM0, G2, ER 90-95% positive, UT <1% negative, HER2 by IHC 2+ equivocal; FISH pending  4 mm IDC is largest focus-2 additional 0 8mm foci and extensive DCIS  No LVI  Extensive LCIS  Margins negative for invasive and in situ  Lymph node assessment/status 0/3            5/2018 -  Hormone Therapy     Anastrozole 1 mg daily    - Dr Medardo Osorio         6/19/2018 - 7/18/2018 Radiation     Right breast to 4256 cGy in 16 fractions followed by a boost to the lumpectomy cavity of an additional 1000 cGy         - Dr Susanna Tyson            Clinical Trial: no    Interval History:  She was last seen on 8/15/18 by Dr Susanna Tyson, at which time she was healing well from radiation  9/18  f/u in Med Onc, she had no evidence of disease  10/2/18 She had an MRI bilateral breasts: Expected postoperative and postradiation changes  No findings to suggest new or residual malignancy  2/7/19 She had mammogram bilateral w 3D and cad: Birad 2  Benign    She was seen by Dr Antony Yin today and returns in 6 months  Today, she has no breast complaints  She is doing breast massage intermittently  She is tolerating anastrozole daily with occasional hot flashes and reports feeling more emotional on this  GYN HX:  Nulliparous  Menarche at 13 years  Patient is postmenopausal   Menopause at 46 years  Menopause Reason - normal  Hormone replacement therapy: no     Birth control pills: yes  Years used 2      Screening  Tobacco  Current tobacco user: no  If yes, brief counseling provided: NA    Hypertension  Hypertension screening performed: yes  Normotensive:  yes  If no, referred to PCP: no    Depression Screening  Screened for depression using PHQ-2: yes    Screened for depression using PHQ-9:  no  Screening positive or negative:  negative  If score >4, was any of the following actions taken?    Additional evaluation for depression, suicide risk assesment, referral to PCP or psychiatry, medication started:  [address] [place] [address] for Patients >65 years  Advanced Care Planning Discussed:  n/a  Patient named surrogate decision maker or care plan in chart: n/a    Health Maintenance   Topic Date Due    Hepatitis C Screening  1965    BMI: Followup Plan  07/21/1981    Pneumococcal PPSV23 Highest Risk Adult (1 of 3 - PCV13) 07/21/1982    DTaP,Tdap,and Td Vaccines (1 - Tdap) 07/21/1984    Depression Screening PHQ  08/15/2019    BMI: Adult  02/07/2020    CRC Screening: Colonoscopy  11/11/2023    INFLUENZA VACCINE  Completed    HEPATITIS B VACCINES  Aged Out       Patient Active Problem List   Diagnosis    Malignant neoplasm of upper-outer quadrant of right breast in female, estrogen receptor positive ([facility] [address] )    Dense breast tissue    Use of anastrozole     Past Medical History:   Diagnosis Date    Anxiety     Bilateral breast cysts     Breast cancer ([facility] [address] )     Depression     Frequent urination     Hashimoto's disease     Hypothyroidism     Night sweats     Palpitations     improved since lowered caffeine intake    Tinnitus     b/l    Viral syndrome      Past Surgical History:   Procedure Laterality Date    BREAST BIOPSY Right 03 01/18    DCIS    BREAST BIOPSY Left 04/2018    benign    BREAST LUMPECTOMY Right 4/24/2018    Procedure: LUMPECTOMY BREAST NEEDLE LOCALIZED BRACKET;  Surgeon: Phil Pat MD;  [facility];  Service: Surgical Oncology    COLONOSCOPY      ESOPHAGOGASTRODUODENOSCOPY      LYMPH NODE BIOPSY Right 4/24/2018    Procedure: BIOPSY LYMPH NODE SENTINEL;  Surgeon: Phil Pat MD;  [facility];  Service: Surgical Oncology    MAMMO NEEDLE LOCALIZATION RIGHT (ALL INC) Right 4/24/2018    MAMMO NEEDLE LOCALIZATION RIGHT (ALL INC) EACH ADD Right 4/24/2018    MAMMO STEREOTACTIC BREAST BIOPSY RIGHT (ALL INC) Right 3/1/2018    MRI BREAST BIOPSY LEFT (ALL INCLUSIVE) Left 4/16/2018    WISDOM TOOTH EXTRACTION       Family History   Problem Relation Age of Onset    Prostate cancer Paternal Uncle 64    No Known Problems Mother     Dementia Father     Heart disease Father      Social History     Socioeconomic History    Marital status: /Civil Union     Spouse name: Not on file    Number of children: Not on file    Years of education: Not on file    Highest education level: Not on file   Occupational History    Not on file   Social Needs    Financial resource strain: Not on file    Food insecurity:     Worry: Not on file     Inability: Not on file    Transportation needs:     Medical: Not on file     Non-medical: Not on file   Tobacco Use    Smoking status: Never Smoker    Smokeless tobacco: Never Used   Substance and Sexual Activity    Alcohol use: Yes     Alcohol/week: 4 2 oz     Types: 7 Standard drinks or equivalent per week     Comment: 7 -8 drinks a week    Drug use: No    Sexual activity: Not on file   Lifestyle    Physical activity:     Days per week: Not on file     Minutes per session: Not on file    Stress: Not on file   Relationships    Social connections:     Talks on phone: Not on file     Gets together: Not on file     Attends Christianity service: Not on file     Active member of club or organization: Not on file     Attends meetings of clubs or organizations: Not on file     Relationship status: Not on file    Intimate partner violence:     Fear of current or ex partner: Not on file     Emotionally abused: Not on file     Physically abused: Not on file     Forced sexual activity: Not on file   Other Topics Concern    Not on file   Social History Narrative    Not on file       Current Outpatient Medications:     anastrozole (ARIMIDEX) 1 mg tablet, Take 1 tablet (1 mg total) by mouth daily, Disp: 90 tablet, Rfl: 0    Calcium Carb-Cholecalciferol (CALCIUM+D3 PO), Take 1 tablet by mouth every other day, Disp: , Rfl:     levothyroxine 88 mcg tablet, Take 1 tablet by mouth daily, Disp: , Rfl:     Multiple Vitamin (MULTIVITAMIN) tablet, Take 1 tablet by mouth daily, Disp: , Rfl:   Allergies   Allergen Reactions    Clindamycin Other (See Comments)     Was painful to walk     Erythromycin Hives    Sulfamethoxazole-Trimethoprim Hives    Amoxicillin Rash       Review of Systems:  Review of Systems   Constitutional: Negative  HENT: Positive for tinnitus (b/l)  Eyes: Negative  Respiratory: Negative  Cardiovascular: Negative  Gastrointestinal: Negative  Endocrine: Positive for cold intolerance  Genitourinary: Positive for frequency  Nocturia 1-2x  Musculoskeletal: Negative  Skin: Negative  Allergic/Immunologic: Negative  Neurological: Positive for dizziness (occasional )  Hematological: Negative  Psychiatric/Behavioral: Negative  Vitals:    02/13/19 1022   BP: 98/64   Pulse: 61   Resp: 16   Temp: 98 4 °F (36 9 °C)   SpO2: 97%   Weight: 82 4 kg (181 lb 9 6 oz)   Height: 5' 6" (1 676 m)            Imaging:Mammo Diagnostic Bilateral W 3d & Cad    Result Date: 2/7/2019  Narrative: DIAGNOSIS: Malignant neoplasm of upper-outer quadrant of right breast in female, estrogen receptor positive ([facility] [address] ) RELEVANT HISTORY: Family Breast Cancer History: No known family history of breast cancer  Family Medical history: No relevant family history has been documented for this patient  Personal History: Hormone history includes other  Surgical history includes breast biopsy and lumpectomy  Medical history includes breast cancer and history of radiation therapy  COMPARISONS: Compared to: 02/06/2018 Mammo screening bilateral w 3d & cad, 01/24/2017 Mammo screening bilateral w 3d & cad, and 12/19/2014 Mammo screening bilateral w cad INDICATION: Jasmin Chow is a 52 y o  female presenting for yr  TECHNOLOSGIST: Marianna Macedo VIEWS: 2D views: CC, XCCL views of left, right breast(s) were taken  2D synth views: MLO views of right, left breast(s) were taken  3D views: MLO views of right breast(s) were taken  TECHNIQUE: The current study was evaluated with Computer Aided Detection  TISSUE DENSITY: The breasts are heterogeneously dense, which may obscure small masses  RISK ASSESSMENT: 5 Year Tyrer-Cuzick: No Score 10 Year Tyrer-Cuzick: No Score Lifetime Tyrer-Cuzick: No Score FINDINGS: RIGHT 1) POST-SURGICAL FINDING: There are post-surgical findings from a previous lumpectomy seen in the upper outer quadrant of the right breast  LEFT There are no suspicious masses, grouped microcalcifications or areas of architectural distortion  The skin and nipple areolar complex are unremarkable  Stable benign-appearing calcifications are seen in both breasts  Impression:  1   No mammographic evidence of malignancy in this post right lumpectomy patient  2  Bilateral diagnostic mammography in 1 year recommended  ASSESSMENT/BI-RADS CATEGORY: Right: 2 - Benign Overall: 2 - Benign RECOMMENDATION:      - Diagnostic Mammogram in 1 year for both breasts   Workstation ID: [id number]

## 2019-02-13 NOTE — PROGRESS NOTES
Follow-up - Radiation Oncology   Marizol Riley 1965 52 y o  female [id number]      History of Present Illness   Cancer Staging  Malignant neoplasm of upper-outer quadrant of right breast in female, estrogen receptor positive ([facility] [address] )  Staging form: Breast, AJCC 8th Edition  - Clinical: cT1mi, cN0, cM0 - Signed by Isabella Sims MD on 3/14/2018  - Pathologic: Stage IA (pT1a, pN0(sn), cM0, G2, ER: Positive, IA: Negative, HER2: Equivocal) - Signed by Isabella Sims MD on 5/8/2018      Marizol Riley is a 52y o  year old female with a history of stage IA invasive right breast carcinoma status post lumpectomy followed by adjuvant breast radiation that was completed on July 18, 2018  She returns today for follow-up visit  Interval History:  She was last seen on 8/15/18 by Dr Alexandria Hinton, at which time she was healing well from radiation       9/18  f/u in Med Onc, she had no evidence of disease  Continue on anastrozole      10/2/18 She had an MRI bilateral breasts: Expected postoperative and postradiation changes   No findings to suggest new or residual malignancy      2/7/19 She had mammogram bilateral w 3D and cad: Birad 2  Benign     She was seen by Dr Kavitha Spangler today and returns in 6 months      Today, she has no breast complaints  She is doing breast massage intermittently  She is tolerating anastrozole daily with occasional hot flashes and reports feeling more emotional on this on the Anastrozole and can be tearful at times especially when she is stressed at work  She works as a  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  She denies any breast masses bilaterally  She reports she is sleeping well       GYN HX:  Nulliparous  Menarche at 10 years  Patient is postmenopausal   Menopause at 48 years    Menopause Reason - normal  Hormone replacement therapy: no     Birth control pills: yes   Years used 2    Clinical Trial: no      Screening  Tobacco  Current tobacco user: no  If yes, brief counseling provided: NA     Hypertension  Hypertension screening performed: yes  Normotensive:  yes  If no, referred to PCP: no     Depression Screening  Screened for depression using PHQ-2: yes     Screened for depression using PHQ-9:  no  Screening positive or negative:  negative  If score >4, was any of the following actions taken? Additional evaluation for depression, suicide risk assesment, referral to PCP or psychiatry, medication started:  n/a     Advanced Care Planning for Patients >65 years  Advanced Care Planning Discussed:  n/a  Patient named surrogate decision maker or care plan in chart: n/a    Historical Information      Malignant neoplasm of upper-outer quadrant of right breast in female, estrogen receptor positive ([facility] [address] )    3/1/2018 Initial Diagnosis     Malignant [facility] of right [facility], [facility]         3/1/2018 Biopsy     RIGHT breast biopsy:  ER/WI negative, HER2 by IHC 3+ positive  Microinvasive carcinoma arising in extensive high-grade ductal carcinoma in-situ (DCIS), grade            4/24/2018 Surgery     LUMPECTOMY BREAST NEEDLE LOCALIZED BRACKET (Right)  BIOPSY LYMPH NODE SENTINEL (Right) - Dr Jovani Ojeda         4/24/2018 -  Cancer Staged     Stage IA - pT1a, pN0(sn), cM0, G2, ER 90-95% positive, WI <1% negative, HER2 by IHC 2+ equivocal; FISH pending  4 mm IDC is largest focus-2 additional 0 8mm foci and extensive DCIS  No LVI  Extensive LCIS  Margins negative for invasive and in situ  Lymph node assessment/status 0/3            5/2018 -  Hormone Therapy     Anastrozole 1 mg daily    - Dr Milton Duque         6/19/2018 - 7/18/2018 Radiation     Right breast to 4256 cGy in 16 fractions followed by a boost to the lumpectomy cavity of an additional 1000 cGy         - Dr Gustavo Armendariz            Past Medical History:   Diagnosis Date    Anxiety     Bilateral breast cysts     Breast cancer ([facility] [address] )     Depression     Frequent urination     Hashimoto's disease     Hypothyroidism     Night sweats     Palpitations     improved since lowered caffeine intake    Tinnitus     b/l    Viral syndrome      Past Surgical History:   Procedure Laterality Date    BREAST BIOPSY Right 03 01/18    DCIS    BREAST BIOPSY Left 04/2018    benign    BREAST LUMPECTOMY Right 4/24/2018    Procedure: LUMPECTOMY BREAST NEEDLE LOCALIZED BRACKET;  Surgeon: Shaylee Thomas MD;  [facility];  Service: Surgical Oncology    COLONOSCOPY      ESOPHAGOGASTRODUODENOSCOPY      LYMPH NODE BIOPSY Right 4/24/2018    Procedure: BIOPSY LYMPH NODE SENTINEL;  Surgeon: Shaylee Thomas MD;  [facility];  Service: Surgical Oncology    MAMMO NEEDLE LOCALIZATION RIGHT (ALL INC) Right 4/24/2018    MAMMO NEEDLE LOCALIZATION RIGHT (ALL INC) EACH ADD Right 4/24/2018    MAMMO STEREOTACTIC BREAST BIOPSY RIGHT (ALL INC) Right 3/1/2018    MRI BREAST BIOPSY LEFT (ALL INCLUSIVE) Left 4/16/2018    WISDOM TOOTH EXTRACTION         Social History   Social History     Substance and Sexual Activity   Alcohol Use Yes    Alcohol/week: 4 2 oz    Types: 7 Standard drinks or equivalent per week    Comment: 7 -8 drinks a week     Social History     Substance and Sexual Activity   Drug Use No     Social History     Tobacco Use   Smoking Status Never Smoker   Smokeless Tobacco Never Used         Meds/Allergies     Current Outpatient Medications:     anastrozole (ARIMIDEX) 1 mg tablet, Take 1 tablet (1 mg total) by mouth daily, Disp: 90 tablet, Rfl: 0    Calcium Carb-Cholecalciferol (CALCIUM+D3 PO), Take 1 tablet by mouth every other day, Disp: , Rfl:     levothyroxine 88 mcg tablet, Take 1 tablet by mouth daily, Disp: , Rfl:     MAGNESIUM-ZINC PO, Take 2 tablets by mouth daily, Disp: , Rfl:     Multiple Vitamin (MULTIVITAMIN) tablet, Take 1 tablet by mouth daily, Disp: , Rfl:   Allergies   Allergen Reactions    Clindamycin Other (See Comments)     Was painful to walk     Erythromycin Hives    Sulfamethoxazole-Trimethoprim Hives    Amoxicillin Rash         Review of Systems   Constitutional: Negative  HENT: Positive for tinnitus (b/l)  Eyes: Negative  Respiratory: Negative  Cardiovascular: Negative  Gastrointestinal: Negative  Endocrine: Positive for cold intolerance  Genitourinary: Positive for frequency  Nocturia 1-2x  Musculoskeletal: Negative  Skin: Negative  Allergic/Immunologic: Negative  Neurological: Positive for dizziness (occasional )  Hematological: Negative  Psychiatric/Behavioral: Negative  OBJECTIVE:   BP 98/64   Pulse 61   Temp 98 4 °F (36 9 °C)   Resp 16   Ht 5' 6" (1 676 m)   Wt 82 4 kg (181 lb 9 6 oz)   LMP  (LMP Unknown)   SpO2 97%   BMI 29 31 kg/m²   Pain Assessment:  0  ECOG/Zubrod/WHO: 1 - Symptomatic but completely ambulatory    Physical Exam   Constitutional: She is oriented to person, place, and time  She appears well-developed and well-nourished  No distress  HENT:   Head: Normocephalic and atraumatic  Mouth/Throat: No oropharyngeal exudate  Eyes: Pupils are equal, round, and reactive to light  Conjunctivae and EOM are normal  No scleral icterus  Neck: Normal range of motion  Neck supple  No tracheal deviation present  No thyromegaly present  Cardiovascular: Normal rate, regular rhythm and normal heart sounds  Pulmonary/Chest: Effort normal and breath sounds normal  No respiratory distress  She has no wheezes  She has no rales  She exhibits no tenderness  Right breast exhibits no inverted nipple, no mass, no nipple discharge, no skin change ( There is a well-healed lumpectomy incision with underlying post treatment changes and no evidence of any masses  Skin is intact and without any erythema nor dryness ) and no tenderness  Left breast exhibits no inverted nipple, no mass, no nipple discharge, no skin change and no tenderness  Abdominal: Soft  Bowel sounds are normal  She exhibits no distension and no mass  There is no tenderness  Musculoskeletal: Normal range of motion  She exhibits no edema or tenderness  Lymphadenopathy:     She has no cervical adenopathy  She has no axillary adenopathy  Right: No supraclavicular adenopathy present  Left: No supraclavicular adenopathy present  Neurological: She is alert and oriented to person, place, and time  No cranial nerve deficit  Coordination normal    Skin: Skin is warm and dry  No rash noted  She is not diaphoretic  No erythema  No pallor  Psychiatric: She has a normal mood and affect  Her behavior is normal  Judgment and thought content normal    Nursing note and vitals reviewed  RESULTS    Lab Results: No results found for this or any previous visit (from the past 672 hour(s))  Imaging Studies:Mammo Diagnostic Bilateral W 3d & Cad    Result Date: 2/7/2019  Narrative: DIAGNOSIS: Malignant neoplasm of upper-outer quadrant of right breast in female, estrogen receptor positive ([facility] [address] ) RELEVANT HISTORY: Family Breast Cancer History: No known family history of breast cancer  Family Medical history: No relevant family history has been documented for this patient  Personal History: Hormone history includes other  Surgical history includes breast biopsy and lumpectomy  Medical history includes breast cancer and history of radiation therapy  COMPARISONS: Compared to: 02/06/2018 Mammo screening bilateral w 3d & cad, 01/24/2017 Mammo screening bilateral w 3d & cad, and 12/19/2014 Mammo screening bilateral w cad INDICATION: Dolores Bourne is a 52 y o  female presenting for yr  TECHNOLOSGIST: Sandy Snook VIEWS: 2D views: CC, XCCL views of left, right breast(s) were taken  2D synth views: MLO views of right, left breast(s) were taken  3D views: MLO views of right breast(s) were taken  TECHNIQUE: The current study was evaluated with Computer Aided Detection  TISSUE DENSITY: The breasts are heterogeneously dense, which may obscure small masses   RISK ASSESSMENT: 5 Year Tyrer-Cuzick: No Score 10 Year Tyrer-Cuzick: No Score Lifetime Tyrer-Cuzick: No Score FINDINGS: RIGHT 1) POST-SURGICAL FINDING: There are post-surgical findings from a previous lumpectomy seen in the upper outer quadrant of the right breast  LEFT There are no suspicious masses, grouped microcalcifications or areas of architectural distortion  The skin and nipple areolar complex are unremarkable  Stable benign-appearing calcifications are seen in both breasts  Impression:  1  No mammographic evidence of malignancy in this post right lumpectomy patient  2  Bilateral diagnostic mammography in 1 year recommended  ASSESSMENT/BI-RADS CATEGORY: Right: 2 - Benign Overall: 2 - Benign RECOMMENDATION:      - Diagnostic Mammogram in 1 year for both breasts  Workstation ID: [id number]    Assessment/Plan:  No orders of the defined types were placed in this encounter  Haresh Chavis is a 52y o  year old female with a history of stage IA invasive right breast carcinoma status post lumpectomy followed by adjuvant breast radiation that was completed on July 18, 2018  She returns today for follow-up visit  She is doing well and has no clinical nor mammographic evidence of any recurrent disease  She is on anastrozole and is tolerating this relatively well  She was seen by Dr Kimberly Loving today follow-up and will return in 6 months  We also asked her to return here for follow-up in 6 months  Shayna Kyle MD  2/13/2019,10:57 AM    Portions of the record may have been created with voice recognition software   Occasional wrong word or "sound a like" substitutions may have occurred due to the inherent limitations of voice recognition software   Read the chart carefully and recognize, using context, where substitutions have occurred

## 2019-02-13 NOTE — PROGRESS NOTES
Surgical Oncology Follow Up       [facility]  [facility] [address] [facility] [address]    Danielillie Schneider  1965  [id number]  [address]  CANCER [facility] SURGICAL ONCOLOGY [place]  [place]    Chief Complaint   Patient presents with    Follow-up     6 month        Assessment/Plan   Diagnoses and all orders for this visit:    Malignant neoplasm of upper-outer quadrant of right breast in female, estrogen receptor positive ([address] )    Dense breast tissue    Use of anastrozole    Abnormal MRI, breast        Advance Care Planning/Advance Directives:  Discussed disease status, cancer treatment plans and/or cancer treatment goals with the patient  Oncology History:    Oncology History    Pt had Stage IA invasive right breast carcinoma status post breast conservation surgery followed by adjuvant breast irradiation which she completed 7/18/18  Her last f/u was 8/15/18 at which time she was healing well, from radiation  Her 9/18  f/u in Med Onc, she had no evidence of disease  10/2/18 She had an MRI bilateral breasts: Expected postoperative and postradiation changes  No findings to suggest new or residual malignancy  2/7/19 She had mammogram bilateral w 3D and cad: [address] 2  Benign  She returns for f/u in Rad onc as well as Surgery today  GYN HX  Nulliparous  Menarche at 13 years  Patient is postmenopausal   Menopause at 46 years  Menopause Reason - normal  Hormone replacement therapy: no     Birth control pills: yes    Years used 2          Malignant neoplasm of upper-outer quadrant of right breast in female, estrogen receptor positive ([address] )    3/1/2018 Initial Diagnosis     Malignant neoplasm of upper-outer quadrant of right breast in female, estrogen receptor positive ([facility] [address] )         3/1/2018 Biopsy     RIGHT breast biopsy:  ER/IN negative, HER2 by IHC 3+ positive  Microinvasive carcinoma arising in extensive high-grade ductal carcinoma in-situ (DCIS), grade            4/24/2018 Surgery     LUMPECTOMY BREAST NEEDLE LOCALIZED BRACKET (Right)  BIOPSY LYMPH NODE SENTINEL (Right) - Dr Kavitha Spangler         4/24/2018 -  Cancer Staged     Stage IA - pT1a, pN0(sn), cM0, G2, ER 90-95% positive, OK <1% negative, HER2 by IHC 2+ equivocal; FISH pending  4 mm IDC is largest focus-2 additional 0 8mm foci and extensive DCIS  No LVI  Extensive LCIS  Margins negative for invasive and in situ  Lymph node assessment/status 0/3            5/2018 -  Hormone Therapy     anastrozole         6/19/2018 - 7/18/2018 Radiation     Right breast to 4256 cGy in 16 fractions followed by a boost to the lumpectomy cavity of an additional 1000 cGy  Dr Alexandria Hinton            History of Present Illness:  Breast cancer follow-up  -Interval History:  None    Review of Systems:  Review of Systems   Constitutional: Negative  Negative for appetite change and fever  Eyes: Negative  Respiratory: Negative for shortness of breath  Cardiovascular: Negative  Gastrointestinal: Negative  Endocrine: Negative  Genitourinary: Negative  Musculoskeletal: Negative  Negative for arthralgias and myalgias  Skin: Negative  Allergic/Immunologic: Negative  Neurological: Negative  Hematological: Negative  Negative for adenopathy  Does not bruise/bleed easily     Psychiatric/Behavioral:        Reports being more emotional and crying since taking the anastrozole       Patient Active Problem List   Diagnosis    Malignant neoplasm of upper-outer quadrant of right breast in female, estrogen receptor positive ([facility] [address] )    Dense breast tissue    Use of anastrozole     Past Medical History:   Diagnosis Date    Anxiety     Bilateral breast cysts     Depression     Frequent urination     Hashimoto's disease     Hypothyroidism     Night sweats     Palpitations     improved since lowered caffeine intake    Tinnitus     b/l    Viral syndrome      Past Surgical History:   Procedure Laterality Date    BREAST BIOPSY Right 03 01/18    DCIS    BREAST BIOPSY Left 04/2018    benign    BREAST LUMPECTOMY Right 4/24/2018    Procedure: LUMPECTOMY BREAST NEEDLE LOCALIZED BRACKET;  Surgeon: Dora Harris MD;  [facility];  Service: Surgical Oncology    COLONOSCOPY      ESOPHAGOGASTRODUODENOSCOPY      LYMPH NODE BIOPSY Right 4/24/2018    Procedure: BIOPSY LYMPH NODE SENTINEL;  Surgeon: Dora Harris MD;  [facility];  Service: Surgical Oncology    MAMMO NEEDLE LOCALIZATION RIGHT (ALL INC) Right 4/24/2018    MAMMO NEEDLE LOCALIZATION RIGHT (ALL INC) EACH ADD Right 4/24/2018    MAMMO STEREOTACTIC BREAST BIOPSY RIGHT (ALL INC) Right 3/1/2018    MRI BREAST BIOPSY LEFT (ALL INCLUSIVE) Left 4/16/2018    WISDOM TOOTH EXTRACTION       Family History   Problem Relation Age of Onset    Prostate cancer Paternal Uncle 64    No Known Problems Mother     Dementia Father     Heart disease Father      Social History     Socioeconomic History    Marital status: /Civil Union     Spouse name: Not on file    Number of children: Not on file    Years of education: Not on file    Highest education level: Not on file   Occupational History    Not on file   Social Needs    Financial resource strain: Not on file    Food insecurity:     Worry: Not on file     Inability: Not on file    Transportation needs:     Medical: Not on file     Non-medical: Not on file   Tobacco Use    Smoking status: Never Smoker    Smokeless tobacco: Never Used   Substance and Sexual Activity    Alcohol use:  Yes     Alcohol/week: 4 2 oz     Types: 7 Standard drinks or equivalent per week     Comment: 7 -8 drinks a week    Drug use: No    Sexual activity: Not on file   Lifestyle    Physical activity:     Days per week: Not on file     Minutes per session: Not on file    Stress: Not on file   Relationships    Social connections:     Talks on phone: Not on file     Gets together: Not on file     Attends Sabianism service: Not on file     Active member of club or organization: Not on file     Attends meetings of clubs or organizations: Not on file     Relationship status: Not on file    Intimate partner violence:     Fear of current or ex partner: Not on file     Emotionally abused: Not on file     Physically abused: Not on file     Forced sexual activity: Not on file   Other Topics Concern    Not on file   Social History Narrative    Not on file       Current Outpatient Medications:     anastrozole (ARIMIDEX) 1 mg tablet, Take 1 tablet (1 mg total) by mouth daily, Disp: 90 tablet, Rfl: 0    Calcium Carb-Cholecalciferol (CALCIUM+D3 PO), Take 1 tablet by mouth every other day, Disp: , Rfl:     levothyroxine 88 mcg tablet, Take 1 tablet by mouth daily, Disp: , Rfl:     Multiple Vitamin (MULTIVITAMIN) tablet, Take 1 tablet by mouth daily, Disp: , Rfl:   Allergies   Allergen Reactions    Clindamycin Other (See Comments)     Was painful to walk     Erythromycin Hives    Sulfamethoxazole-Trimethoprim Hives    Amoxicillin Rash       The following portions of the patient's history were reviewed and updated as appropriate: allergies, current medications, past family history, past medical history, past social history, past surgical history and problem list         Vitals:    02/13/19 0939   BP: 110/70   Pulse: 64   Resp: 14   Temp: 97 5 °F (36 4 °C)       Physical Exam   Constitutional: She is oriented to person, place, and time  She appears well-developed and well-nourished  HENT:   Head: Normocephalic and atraumatic  Cardiovascular: Normal heart sounds  Pulmonary/Chest: Breath sounds normal  Right breast exhibits skin change (Lumpectomy scar)  Right breast exhibits no inverted nipple, no mass, no nipple discharge and no tenderness  Left breast exhibits no inverted nipple, no mass, no nipple discharge, no skin change and no tenderness  Breasts are asymmetrical    Abdominal: Soft  Lymphadenopathy:        Right axillary: No pectoral and no lateral adenopathy present  Left axillary: No pectoral and no lateral adenopathy present  Right: No supraclavicular adenopathy present  Left: No supraclavicular adenopathy present  Neurological: She is alert and oriented to person, place, and time  Psychiatric: She has a normal mood and affect  Results:      Imaging  10/02/2018 bilateral MRI the breast is benign/stable  02/07/2019 bilateral 3D diagnostic mammogram is benign/stable, density is three    I reviewed the above imaging data  Discussion/Summary:  38-year-old female status post right breast conservation for a stage IA carcinoma  She continues on anastrozole  She reports feeling more emotional on this but no other concerns  There are no concerns on examination today  She had her follow-up breast MRI which was stable  She also had her mammogram done recently which was also benign  She continues to have dense breast tissue  The recommendation is to continue with annual mammography and MRI  I will see her again in six months for another clinical exam or sooner should the need arise

## 2019-04-18 ENCOUNTER — OFFICE VISIT (OUTPATIENT)
Dept: HEMATOLOGY ONCOLOGY | Facility: CLINIC | Age: 54
End: 2019-04-18
Payer: COMMERCIAL

## 2019-04-18 VITALS
WEIGHT: 180.2 LBS | SYSTOLIC BLOOD PRESSURE: 98 MMHG | BODY MASS INDEX: 28.28 KG/M2 | RESPIRATION RATE: 18 BRPM | HEIGHT: 67 IN | TEMPERATURE: 97.6 F | HEART RATE: 75 BPM | OXYGEN SATURATION: 96 % | DIASTOLIC BLOOD PRESSURE: 70 MMHG

## 2019-04-18 DIAGNOSIS — C50.411 MALIGNANT NEOPLASM OF UPPER-OUTER QUADRANT OF RIGHT BREAST IN FEMALE, ESTROGEN RECEPTOR POSITIVE (HCC): Primary | ICD-10-CM

## 2019-04-18 DIAGNOSIS — Z17.0 MALIGNANT NEOPLASM OF UPPER-OUTER QUADRANT OF RIGHT BREAST IN FEMALE, ESTROGEN RECEPTOR POSITIVE (HCC): Primary | ICD-10-CM

## 2019-04-18 PROCEDURE — 99214 OFFICE O/P EST MOD 30 MIN: CPT | Performed by: INTERNAL MEDICINE

## 2019-04-18 RX ORDER — ANASTROZOLE 1 MG/1
1 TABLET ORAL DAILY
Qty: 90 TABLET | Refills: 3 | Status: SHIPPED | OUTPATIENT
Start: 2019-04-18 | End: 2020-01-13 | Stop reason: SDUPTHER

## 2019-04-18 RX ORDER — MULTIVIT-MIN/IRON/FOLIC ACID/K 45-800-120
1 CAPSULE ORAL
COMMUNITY
End: 2019-04-18 | Stop reason: ALTCHOICE

## 2019-08-20 ENCOUNTER — OFFICE VISIT (OUTPATIENT)
Dept: SURGICAL ONCOLOGY | Facility: CLINIC | Age: 54
End: 2019-08-20
Payer: COMMERCIAL

## 2019-08-20 VITALS
DIASTOLIC BLOOD PRESSURE: 70 MMHG | SYSTOLIC BLOOD PRESSURE: 122 MMHG | WEIGHT: 177.6 LBS | HEIGHT: 67 IN | HEART RATE: 80 BPM | RESPIRATION RATE: 18 BRPM | BODY MASS INDEX: 27.88 KG/M2 | TEMPERATURE: 98.9 F

## 2019-08-20 DIAGNOSIS — Z17.0 MALIGNANT NEOPLASM OF UPPER-OUTER QUADRANT OF RIGHT BREAST IN FEMALE, ESTROGEN RECEPTOR POSITIVE (HCC): Primary | ICD-10-CM

## 2019-08-20 DIAGNOSIS — R10.2 PELVIC PAIN: ICD-10-CM

## 2019-08-20 DIAGNOSIS — R92.2 DENSE BREAST TISSUE: ICD-10-CM

## 2019-08-20 DIAGNOSIS — C44.311 BASAL CELL CARCINOMA (BCC) OF SKIN OF NOSE: ICD-10-CM

## 2019-08-20 DIAGNOSIS — Z79.811 USE OF ANASTROZOLE: ICD-10-CM

## 2019-08-20 DIAGNOSIS — C50.411 MALIGNANT NEOPLASM OF UPPER-OUTER QUADRANT OF RIGHT BREAST IN FEMALE, ESTROGEN RECEPTOR POSITIVE (HCC): Primary | ICD-10-CM

## 2019-08-20 PROCEDURE — 99215 OFFICE O/P EST HI 40 MIN: CPT | Performed by: SURGERY

## 2019-08-20 NOTE — PROGRESS NOTES
Surgical Oncology Follow Up       [address] SURGICAL ONCOLOGY [facility] [address]    Frutoso Hoar  1965  [id number]  [facility] SURGICAL ONCOLOGY [facility]    Chief Complaint   Patient presents with    Follow-up       Assessment/Plan   Diagnoses and all orders for this visit:    Malignant neoplasm of upper-outer quadrant of right breast in female, estrogen receptor positive ([facility] [address] )  -     MRI breast bilateral w and wo contrast w cad; Future  -     Mammo diagnostic bilateral w 3d & cad; Future    Dense breast tissue    Pelvic pain  -     Ambulatory referral to Gynecology; Future    Basal cell carcinoma (BCC) of skin of nose  -     Ambulatory referral to Dermatology; Future    Use of anastrozole        Advance Care Planning/Advance Directives:  Discussed disease status, cancer treatment plans and/or cancer treatment goals with the patient  Oncology History:       Malignant neoplasm of upper-outer quadrant of right breast in female, estrogen receptor positive ([facility] [address] )    3/1/2018 Initial Diagnosis     Malignant neoplasm of upper-outer quadrant of right breast in female, estrogen receptor positive ([facility] [address] )      3/1/2018 Biopsy     RIGHT breast biopsy:  ER/WA negative, HER2 by IHC 3+ positive  Microinvasive carcinoma arising in extensive high-grade ductal carcinoma in-situ (DCIS), grade         4/24/2018 Surgery     LUMPECTOMY BREAST NEEDLE LOCALIZED BRACKET (Right)  BIOPSY LYMPH NODE SENTINEL (Right) - Dr Anne Suarez      4/24/2018 -  Cancer Staged     Stage IA - pT1a, pN0(sn), cM0, G2, ER 90-95% positive, WA <1% negative, HER2 by IHC 2+ equivocal; FISH pending  4 mm IDC is largest focus-2 additional 0 8mm foci and extensive DCIS  No LVI  Extensive LCIS    Margins negative for invasive and in situ  Lymph node assessment/status 0/3         5/2018 -  Hormone Therapy     Anastrozole 1 mg daily    -  Chevy Rodriguez      6/19/2018 - 7/18/2018 Radiation     Right breast to 4256 cGy in 16 fractions followed by a boost to the lumpectomy cavity of an additional 1000 cGy  - Dr Michael Wiggins         History of Present Illness:  Breast cancer follow-up  -Interval History:  As noted in ROS    Review of Systems:  Review of Systems   Constitutional: Negative  Negative for appetite change and fever  Eyes: Negative  Respiratory: Negative for shortness of breath  Cardiovascular: Negative  Gastrointestinal: Negative  Endocrine: Negative  Genitourinary: Positive for pelvic pain (Reports a history of ovarian cysts, has not seen a gynecologist in many years)  Musculoskeletal: Negative  Negative for arthralgias and myalgias  Skin:        Basal cell carcinoma of the nose, recommended to have Mohs procedure, would like 2nd opinion   Allergic/Immunologic: Negative  Neurological: Negative  Hematological: Negative  Negative for adenopathy  Does not bruise/bleed easily  Psychiatric/Behavioral: Negative          Patient Active Problem List   Diagnosis    Malignant neoplasm of upper-outer quadrant of right breast in female, estrogen receptor positive ([address] )    Dense breast tissue    Use of anastrozole    Pelvic pain    Basal cell carcinoma (BCC) of skin of nose     Past Medical History:   Diagnosis Date    Anxiety     Bilateral breast cysts     Depression     Frequent urination     Hashimoto's disease     Hypothyroidism     Night sweats     Palpitations     improved since lowered caffeine intake    Tinnitus     b/l    Viral syndrome      Past Surgical History:   Procedure Laterality Date    BREAST BIOPSY Right 03 01/18    DCIS    BREAST BIOPSY Left 04/2018    benign    BREAST LUMPECTOMY Right 4/24/2018    Procedure: LUMPECTOMY BREAST NEEDLE LOCALIZED BRACKET;  Surgeon: Creig Halsted, MD;  [facility]: [facility];  Service: Surgical Oncology    COLONOSCOPY      ESOPHAGOGASTRODUODENOSCOPY      LYMPH NODE BIOPSY Right 4/24/2018    Procedure: BIOPSY LYMPH NODE SENTINEL;  Surgeon: Lou Cowan MD;  [facility];  Service: Surgical Oncology    MAMMO NEEDLE LOCALIZATION RIGHT (ALL INC) Right 4/24/2018    MAMMO NEEDLE LOCALIZATION RIGHT (ALL INC) EACH ADD Right 4/24/2018    MAMMO STEREOTACTIC BREAST BIOPSY RIGHT (ALL INC) Right 3/1/2018    MRI BREAST BIOPSY LEFT (ALL INCLUSIVE) Left 4/16/2018    WISDOM TOOTH EXTRACTION       Family History   Problem Relation Age of Onset    Prostate cancer Paternal Uncle 64    No Known Problems Mother     Dementia Father     Heart disease Father      Social History     Socioeconomic History    Marital status: /Civil Union     Spouse name: Not on file    Number of children: Not on file    Years of education: Not on file    Highest education level: Not on file   Occupational History    Not on file   Social Needs    Financial resource strain: Not on file    Food insecurity:     Worry: Not on file     Inability: Not on file    Transportation needs:     Medical: Not on file     Non-medical: Not on file   Tobacco Use    Smoking status: Never Smoker    Smokeless tobacco: Never Used   Substance and Sexual Activity    Alcohol use:  Yes     Alcohol/week: 7 0 standard drinks     Types: 7 Standard drinks or equivalent per week     Comment: 7 -8 drinks a week    Drug use: No    Sexual activity: Not on file   Lifestyle    Physical activity:     Days per week: Not on file     Minutes per session: Not on file    Stress: Not on file   Relationships    Social connections:     Talks on phone: Not on file     Gets together: Not on file     Attends Druze service: Not on file     Active member of club or organization: Not on file     Attends meetings of clubs or organizations: Not on file     Relationship status: Not on file    Intimate partner violence:     Fear of current or ex partner: Not on file     Emotionally abused: Not on file     Physically abused: Not on file     Forced sexual activity: Not on file   Other Topics Concern    Not on file   Social History Narrative    Not on file       Current Outpatient Medications:     anastrozole (ARIMIDEX) 1 mg tablet, Take 1 tablet (1 mg total) by mouth daily, Disp: 90 tablet, Rfl: 3    Calcium Carb-Cholecalciferol (CALCIUM+D3 PO), Take 1 tablet by mouth every other day, Disp: , Rfl:     levothyroxine 88 mcg tablet, Take 1 tablet by mouth daily, Disp: , Rfl:     Multiple Vitamin (MULTIVITAMIN) tablet, Take 1 tablet by mouth daily, Disp: , Rfl:   Allergies   Allergen Reactions    Clindamycin Other (See Comments)     Was painful to walk     Erythromycin Hives    Sulfamethoxazole-Trimethoprim Hives    Amoxicillin Rash       The following portions of the patient's history were reviewed and updated as appropriate: allergies, current medications, past family history, past medical history, past social history, past surgical history and problem list         Vitals:    08/20/19 0935   BP: 122/70   Pulse: 80   Resp: 18   Temp: 98 9 °F (37 2 °C)       Physical Exam   Constitutional: She is oriented to person, place, and time  She appears well-developed and well-nourished  HENT:   Head: Normocephalic and atraumatic  Cardiovascular: Normal heart sounds  Pulmonary/Chest: Breath sounds normal  Right breast exhibits skin change (Lumpectomy scar) and tenderness ( in the lumpectomy bed)  Right breast exhibits no inverted nipple, no mass and no nipple discharge  Left breast exhibits no inverted nipple, no mass, no nipple discharge, no skin change and no tenderness  Abdominal: Soft  Lymphadenopathy:        Right axillary: No pectoral and no lateral adenopathy present  Left axillary: No pectoral and no lateral adenopathy present  Right: No supraclavicular adenopathy present  Left: No supraclavicular adenopathy present  Neurological: She is alert and oriented to person, place, and time     Psychiatric: She has a normal mood and affect  Discussion/Summary:  12-year-old female status post right breast conservation for a stage IA invasive ductal carcinoma  She continues on anastrozole and reports feeling better on this compared to when I saw her the last time  She states that she has pain in the pelvic area  She reports a history of ovarian cyst   She has not seen a gynecologist in many years  I am referring her to GYN for evaluation  She also states that she was recently diagnosed with a basal cell carcinoma on her nose and was advised to have a Mohs procedure  She is reluctant to have this done  I will refer her to another dermatologist for 2nd opinion  She is not happy with her current dermatologist   I will make arrangements for her breast MRI due in October and her mammogram due in February  I will see her again in six months or sooner should the need arise

## 2019-08-22 ENCOUNTER — RADIATION ONCOLOGY FOLLOW-UP (OUTPATIENT)
Dept: RADIATION ONCOLOGY | Facility: CLINIC | Age: 54
End: 2019-08-22
Attending: RADIOLOGY
Payer: COMMERCIAL

## 2019-08-22 VITALS
RESPIRATION RATE: 18 BRPM | BODY MASS INDEX: 28.25 KG/M2 | HEIGHT: 67 IN | HEART RATE: 63 BPM | WEIGHT: 180 LBS | TEMPERATURE: 97.8 F | SYSTOLIC BLOOD PRESSURE: 84 MMHG | OXYGEN SATURATION: 96 % | DIASTOLIC BLOOD PRESSURE: 62 MMHG

## 2019-08-22 DIAGNOSIS — C50.411 MALIGNANT NEOPLASM OF UPPER-OUTER QUADRANT OF RIGHT BREAST IN FEMALE, ESTROGEN RECEPTOR POSITIVE (HCC): Primary | ICD-10-CM

## 2019-08-22 DIAGNOSIS — Z17.0 MALIGNANT NEOPLASM OF UPPER-OUTER QUADRANT OF RIGHT BREAST IN FEMALE, ESTROGEN RECEPTOR POSITIVE (HCC): ICD-10-CM

## 2019-08-22 DIAGNOSIS — Z17.0 MALIGNANT NEOPLASM OF UPPER-OUTER QUADRANT OF RIGHT BREAST IN FEMALE, ESTROGEN RECEPTOR POSITIVE (HCC): Primary | ICD-10-CM

## 2019-08-22 DIAGNOSIS — Z79.811 USE OF ANASTROZOLE: ICD-10-CM

## 2019-08-22 DIAGNOSIS — C44.311 BASAL CELL CARCINOMA (BCC) OF SKIN OF NOSE: Primary | ICD-10-CM

## 2019-08-22 DIAGNOSIS — C50.411 MALIGNANT NEOPLASM OF UPPER-OUTER QUADRANT OF RIGHT BREAST IN FEMALE, ESTROGEN RECEPTOR POSITIVE (HCC): ICD-10-CM

## 2019-08-22 PROCEDURE — 99211 OFF/OP EST MAY X REQ PHY/QHP: CPT | Performed by: RADIOLOGY

## 2019-08-22 PROCEDURE — G0463 HOSPITAL OUTPT CLINIC VISIT: HCPCS | Performed by: RADIOLOGY

## 2019-08-22 NOTE — PROGRESS NOTES
Follow-up - Radiation Oncology   Rigoberto Cabrera 1965 62 y o  female [id number]      History of Present Illness   Cancer Staging  Malignant neoplasm of upper-outer quadrant of right breast in female, estrogen receptor positive ([facility] [address] )  Staging form: Breast, AJCC 8th Edition  - Clinical: cT1mi, cN0, cM0 - Signed by Rabia Bravo MD on 3/14/2018  Laterality: Right  - Pathologic: Stage IA (pT1a, pN0(sn), cM0, G2, ER: Positive, PA: Negative, HER2: Equivocal) - Signed by Rabia Bravo MD on 5/8/2018  Laterality: Right  Method of lymph node assessment: [place] lymph node biopsy  Histologic grading system: 3 grade system      Riogberto Cabrera is a 62y o  year old female with a history of a Stage IA invasive right breast carcinoma status post breast conservation surgery followed by adjuvant breast irradiation which she completed 7/18/18  She was last seen here on 2/13/19  Her last mammogram was 2/7/19 and was BI-RADS 2, benign  Interval History:   4/18/19 Dr Kevin Castro f/u  - continue anastrozole     8/20/19 Dr Mira Green f/u  - referral to gyn for complaints of pelvic pain, hx of ovarian cyst     Recently diagnosed with [facility], she reported the lesion that looked like a pimple that was scraped/biopsied in June 2019, Mohs surgery recommended by her dermatologist at [address] [place] [address] Dermatology  Patient undecided about this treatment, Dr Mira Green referring her to another dermatologist for second opinion  No breasts complaints  Doing breast massage about twice a week  Occasional pelvic cramping discomfort  No vaginal bleeding   Tolerating anastrozole well with infrequent hot flashes       10/3/19 Bilateral breast MRI  2/7/20 Bilateral diagnostic mammogram  2/25/20 Dr Mira Green f/u  4/20/20 Dr Kevin Castro f/u    Historical Information      Malignant neoplasm of upper-outer quadrant of right breast in female, estrogen receptor positive ([facility] [address] )    3/1/2018 Initial Diagnosis     Malignant neoplasm of upper-outer quadrant of right breast in female, estrogen receptor positive ([facility] [address] )      3/1/2018 Biopsy     RIGHT breast biopsy:  ER/LA negative, HER2 by IHC 3+ positive  Microinvasive carcinoma arising in extensive high-grade ductal carcinoma in-situ (DCIS), grade         4/24/2018 Surgery     LUMPECTOMY BREAST NEEDLE LOCALIZED BRACKET (Right)  BIOPSY LYMPH NODE SENTINEL (Right) - Dr Giovana Doss      4/24/2018 -  Cancer Staged     Stage IA - pT1a, pN0(sn), cM0, G2, ER 90-95% positive, LA <1% negative, HER2 by IHC 2+ equivocal; FISH pending  4 mm IDC is largest focus-2 additional 0 8mm foci and extensive DCIS  No LVI  Extensive LCIS  Margins negative for invasive and in situ  Lymph node assessment/status 0/3         5/2018 -  Hormone Therapy     Anastrozole 1 mg daily    - Dr Rufus Cranker      6/19/2018 - 7/18/2018 Radiation     Right breast to 4256 cGy in 16 fractions followed by a boost to the lumpectomy cavity of an additional 1000 cGy         - Dr Brinda Dewey         Past Medical History:   Diagnosis Date    Anxiety     Basal cell carcinoma (BCC) 2019    nose    Bilateral breast cysts     Breast cancer (HCC)     Depression     Frequent urination     Hashimoto's disease     Hypothyroidism     Night sweats     Ovarian cyst     Palpitations     improved since lowered caffeine intake    Tinnitus     b/l    Viral syndrome      Past Surgical History:   Procedure Laterality Date    BREAST BIOPSY Right 03 01/18    DCIS    BREAST BIOPSY Left 04/2018    benign    BREAST LUMPECTOMY Right 4/24/2018    Procedure: LUMPECTOMY BREAST NEEDLE LOCALIZED BRACKET;  Surgeon: Flor Chester MD;  [facility];  Service: Surgical Oncology    COLONOSCOPY      ESOPHAGOGASTRODUODENOSCOPY      LYMPH NODE BIOPSY Right 4/24/2018    Procedure: BIOPSY LYMPH NODE SENTINEL;  Surgeon: Flor Chester MD;  [facility];  Service: Surgical Oncology    MAMMO NEEDLE LOCALIZATION RIGHT (ALL INC) Right 4/24/2018    MAMMO NEEDLE LOCALIZATION RIGHT (ALL INC) EACH ADD Right 4/24/2018    MAMMO STEREOTACTIC BREAST BIOPSY RIGHT (ALL INC) Right 3/1/2018    MRI BREAST BIOPSY LEFT (ALL INCLUSIVE) Left 4/16/2018    WISDOM TOOTH EXTRACTION         Social History   Social History     Substance and Sexual Activity   Alcohol Use Yes    Alcohol/week: 7 0 standard drinks    Types: 7 Standard drinks or equivalent per week    Comment: 7 -8 drinks a week     Social History     Substance and Sexual Activity   Drug Use No     Social History     Tobacco Use   Smoking Status Never Smoker   Smokeless Tobacco Never Used     Meds/Allergies     Current Outpatient Medications:     anastrozole (ARIMIDEX) 1 mg tablet, Take 1 tablet (1 mg total) by mouth daily, Disp: 90 tablet, Rfl: 3    Calcium Carb-Cholecalciferol (CALCIUM+D3 PO), Take 1 tablet by mouth daily , Disp: , Rfl:     levothyroxine 88 mcg tablet, Take 1 tablet by mouth daily, Disp: , Rfl:     Multiple Vitamin (MULTIVITAMIN) tablet, Take 1 tablet by mouth daily, Disp: , Rfl:   Allergies   Allergen Reactions    Clindamycin Other (See Comments)     Was painful to walk     Erythromycin Hives    Sulfamethoxazole-Trimethoprim Hives    Amoxicillin Rash     Review of Systems   Constitutional: Positive for fatigue (sometimes)  HENT: Negative  Eyes: Negative  Respiratory: Negative  Cardiovascular: Negative  Gastrointestinal: Negative  Endocrine: Negative  Genitourinary: Positive for pelvic pain (intermittent cramping)  Musculoskeletal: Negative  Skin: Negative  Allergic/Immunologic: Negative  Neurological: Negative  Hematological: Negative  Psychiatric/Behavioral: Negative        OBJECTIVE:   BP (!) 84/62   Pulse 63   Temp 97 8 °F (36 6 °C)   Resp 18   Ht 5' 6 5" (1 689 m)   Wt 81 6 kg (180 lb)   LMP  (LMP Unknown)   SpO2 96%   BMI 28 62 kg/m²   Pain Assessment:  0  ECOG/Zubrod/WHO: 1 - Symptomatic but completely ambulatory    Physical Exam  Constitutional: She is oriented to person, place, and time   She appears well-developed and well-nourished  No distress  HENT:   Head: Normocephalic and atraumatic  Mouth/Throat: No oropharyngeal exudate  Eyes: Pupils are equal, round, and reactive to light  Conjunctivae and EOM are normal  No scleral icterus  Neck: Normal range of motion  Neck supple  No tracheal deviation present  No thyromegaly present  Cardiovascular: Normal rate, regular rhythm and normal heart sounds  Pulmonary/Chest: Effort normal and breath sounds normal  No respiratory distress  She has no wheezes  She has no rales  She exhibits no tenderness  Right breast exhibits no inverted nipple, no mass, no nipple discharge, no skin change ( There is a well-healed lumpectomy incision with underlying post treatment changes and no evidence of any masses  Skin is intact and without any erythema nor dryness ) and no tenderness  Left breast exhibits no inverted nipple, no mass, no nipple discharge, no skin change and no tenderness  Abdominal: Soft  Bowel sounds are normal  She exhibits no distension and no mass  There is no tenderness  Musculoskeletal: Normal range of motion  She exhibits no edema or tenderness  Lymphadenopathy:     She has no cervical adenopathy  She has no axillary adenopathy  Right: No supraclavicular adenopathy present  Left: No supraclavicular adenopathy present  Neurological: She is alert and oriented to person, place, and time  No cranial nerve deficit  Coordination normal    Skin: Skin is warm and dry  No rash noted  She is not diaphoretic  No erythema  No pallor  Examination of the nose reveals no gross evidence of any skin cancer but there is biopsy site that is well healed  Psychiatric: She has a normal mood and affect  Her behavior is normal  Judgment and thought content normal    Nursing note and vitals reviewed  RESULTS    Lab Results: No results found for this or any previous visit (from the past 672 hour(s))      Imaging Studies:No results found       Assessment/Plan:  No orders of the defined types were placed in this encounter  Lana Galeano is a 62y o  year old female with a history of stage IA invasive right breast carcinoma status post lumpectomy followed by adjuvant breast radiation that was completed on July 18, 2018  She returns today for follow-up visit  She is doing well and has no clinical nor mammographic evidence of any recurrent disease  She is on anastrozole and is tolerating this relatively well  She was seen by Dr Cathy Cantor August 20, 2019 for follow-up and will return in 6 months  She has bilateral breast MRI scheduled for October 3, 2019 and bilateral diagnostic mammogram February 7, 2020  We asked her to return here for follow-up in 12 months  She was also recently been diagnosed with a basal cell carcinoma of the skin of the nose and is considering Mohs surgery  We also told her she would be a candidate for definitive radiation therapy for treatment of her nasal skin carcinoma  Jayesh Acosta MD  8/22/2019,10:50 AM    Portions of the record may have been created with voice recognition software   Occasional wrong word or "sound a like" substitutions may have occurred due to the inherent limitations of voice recognition software   Read the chart carefully and recognize, using context, where substitutions have occurred

## 2019-08-22 NOTE — PROGRESS NOTES
Shira Gloria 1965 is a 62 y o  female       Follow up visit   Barbie Baeza is a 62year old female with Stage IA invasive right breast carcinoma status post breast conservation surgery followed by adjuvant breast irradiation which she completed 7/18/18  She was last seen on 2/13/19  Her last mammogram was 2/7/19 and was BI-RADS 2, benign  4/18/19 Dr Mckenna Gomez f/u  - continue anastrozole    8/20/19 Dr Amanda Coronel f/u  - referral to gyn for complaints of pelvic pain, hx of ovarian cyst    Recently diagnosed with [facility], Mohs surgery recommended by her dermatologist  Patient undecided about this treatment, Dr Amanda Coronel referring her to another dermatologist for second opinion  No breasts complaints  Doing breast massage about twice a week  Occasional pelvic cramping discomfort  No vaginal bleeding  Tolerating anastrozole well with infrequent hot flashes  10/3/19 Bilateral breast MRI  2/7/20 Bilateral diagnostic mammogram  2/25/20 Dr Amanda Coronel f/u  4/20/20 Dr Mckenna Gomez f/u         Malignant neoplasm of upper-outer quadrant of right breast in female, estrogen receptor positive ([facility] [address] )    3/1/2018 Initial Diagnosis     Malignant neoplasm of upper-outer quadrant of right breast in female, estrogen receptor positive ([facility] [address] )      3/1/2018 Biopsy     RIGHT breast biopsy:  ER/MO negative, HER2 by IHC 3+ positive  Microinvasive carcinoma arising in extensive high-grade ductal carcinoma in-situ (DCIS), grade         4/24/2018 Surgery     LUMPECTOMY BREAST NEEDLE LOCALIZED BRACKET (Right)  BIOPSY LYMPH NODE SENTINEL (Right) - Dr Amanda Coronel      4/24/2018 -  Cancer Staged     Stage IA - pT1a, pN0(sn), cM0, G2, ER 90-95% positive, MO <1% negative, HER2 by IHC 2+ equivocal; FISH pending  4 mm IDC is largest focus-2 additional 0 8mm foci and extensive DCIS  No LVI  Extensive LCIS    Margins negative for invasive and in situ  Lymph node assessment/status 0/3         5/2018 -  Hormone Therapy     Anastrozole 1 mg daily    -  Len Mack      6/19/2018 - 7/18/2018 Radiation     Right breast to 4256 cGy in 16 fractions followed by a boost to the lumpectomy cavity of an additional 1000 cGy         - Dr Elena Cesar         Clinical Trial: no    Imaging    Health Maintenance   Topic Date Due    Hepatitis C Screening  1965    Pneumococcal Vaccine: Pediatrics (0 to 5 Years) and At-Risk Patients (6 to 59 Years) (1 of 3 - PCV13) 07/21/1969    BMI: Followup Plan  07/21/1981    DTaP,Tdap,and Td Vaccines (1 - Tdap) 07/21/1984    INFLUENZA VACCINE  07/01/2019    MAMMOGRAM  02/07/2020    Depression Screening PHQ  02/13/2020    BMI: Adult  08/20/2020    CRC Screening: Colonoscopy  11/11/2023    Pneumococcal Vaccine: 65+ Years (1 of 2 - PCV13) 07/21/2028    HEPATITIS B VACCINES  Aged Out       Patient Active Problem List   Diagnosis    Malignant neoplasm of upper-outer quadrant of right breast in female, estrogen receptor positive ([address] )    Dense breast tissue    Use of anastrozole    Pelvic pain    Basal cell carcinoma (BCC) of skin of nose     Past Medical History:   Diagnosis Date    Anxiety     Basal cell carcinoma (BCC) 2019    nose    Bilateral breast cysts     Breast cancer ([address] )     Depression     Frequent urination     Hashimoto's disease     Hypothyroidism     Night sweats     Ovarian cyst     Palpitations     improved since lowered caffeine intake    Tinnitus     b/l    Viral syndrome      Past Surgical History:   Procedure Laterality Date    BREAST BIOPSY Right 03 01/18    DCIS    BREAST BIOPSY Left 04/2018    benign    BREAST LUMPECTOMY Right 4/24/2018    Procedure: LUMPECTOMY BREAST NEEDLE LOCALIZED BRACKET;  Surgeon: Grecia Ann MD;  [facility];  Service: Surgical Oncology    COLONOSCOPY      ESOPHAGOGASTRODUODENOSCOPY      LYMPH NODE BIOPSY Right 4/24/2018    Procedure: BIOPSY LYMPH NODE SENTINEL;  Surgeon: Grecia Ann MD;  [facility];  Service: Surgical Oncology    MAMMO NEEDLE LOCALIZATION RIGHT (ALL INC) Right 4/24/2018    MAMMO NEEDLE LOCALIZATION RIGHT (ALL INC) EACH ADD Right 4/24/2018    MAMMO STEREOTACTIC BREAST BIOPSY RIGHT (ALL INC) Right 3/1/2018    MRI BREAST BIOPSY LEFT (ALL INCLUSIVE) Left 4/16/2018    WISDOM TOOTH EXTRACTION       Family History   Problem Relation Age of Onset    Prostate cancer Paternal Uncle 64    No Known Problems Mother     Dementia Father     Heart disease Father      Social History     Socioeconomic History    Marital status: /Civil Union     Spouse name: Not on file    Number of children: Not on file    Years of education: Not on file    Highest education level: Not on file   Occupational History    Not on file   Social Needs    Financial resource strain: Not on file    Food insecurity:     Worry: Not on file     Inability: Not on file    Transportation needs:     Medical: Not on file     Non-medical: Not on file   Tobacco Use    Smoking status: Never Smoker    Smokeless tobacco: Never Used   Substance and Sexual Activity    Alcohol use:  Yes     Alcohol/week: 7 0 standard drinks     Types: 7 Standard drinks or equivalent per week     Comment: 7 -8 drinks a week    Drug use: No    Sexual activity: Not on file   Lifestyle    Physical activity:     Days per week: Not on file     Minutes per session: Not on file    Stress: Not on file   Relationships    Social connections:     Talks on phone: Not on file     Gets together: Not on file     Attends Islam service: Not on file     Active member of club or organization: Not on file     Attends meetings of clubs or organizations: Not on file     Relationship status: Not on file    Intimate partner violence:     Fear of current or ex partner: Not on file     Emotionally abused: Not on file     Physically abused: Not on file     Forced sexual activity: Not on file   Other Topics Concern    Not on file   Social History Narrative    Not on file       Current Outpatient Medications:     anastrozole (ARIMIDEX) 1 mg tablet, Take 1 tablet (1 mg total) by mouth daily, Disp: 90 tablet, Rfl: 3    Calcium Carb-Cholecalciferol (CALCIUM+D3 PO), Take 1 tablet by mouth daily , Disp: , Rfl:     levothyroxine 88 mcg tablet, Take 1 tablet by mouth daily, Disp: , Rfl:     Multiple Vitamin (MULTIVITAMIN) tablet, Take 1 tablet by mouth daily, Disp: , Rfl:   Allergies   Allergen Reactions    Clindamycin Other (See Comments)     Was painful to walk     Erythromycin Hives    Sulfamethoxazole-Trimethoprim Hives    Amoxicillin Rash       Review of Systems:  Review of Systems   Constitutional: Positive for fatigue (sometimes)  HENT: Negative  Eyes: Negative  Respiratory: Negative  Cardiovascular: Negative  Gastrointestinal: Negative  Endocrine: Negative  Genitourinary: Positive for pelvic pain (intermittent cramping)  Musculoskeletal: Negative  Skin: Negative  Allergic/Immunologic: Negative  Neurological: Negative  Hematological: Negative  Psychiatric/Behavioral: Negative  Vitals:    08/22/19 0950   BP: (!) 84/62   Pulse: 63   Resp: 18   Temp: 97 8 °F (36 6 °C)   SpO2: 96%   Weight: 81 6 kg (180 lb)   Height: 5' 6 5" (1 689 m)        Pain assessment:0    Imaging:No results found      Teaching

## 2019-09-04 ENCOUNTER — OFFICE VISIT (OUTPATIENT)
Dept: GYNECOLOGY | Facility: CLINIC | Age: 54
End: 2019-09-04
Payer: COMMERCIAL

## 2019-09-04 VITALS
HEART RATE: 83 BPM | DIASTOLIC BLOOD PRESSURE: 80 MMHG | BODY MASS INDEX: 28.09 KG/M2 | SYSTOLIC BLOOD PRESSURE: 118 MMHG | WEIGHT: 179 LBS | HEIGHT: 67 IN

## 2019-09-04 DIAGNOSIS — R10.30 LOWER ABDOMINAL PAIN: Primary | ICD-10-CM

## 2019-09-04 PROCEDURE — 99242 OFF/OP CONSLTJ NEW/EST SF 20: CPT | Performed by: OBSTETRICS & GYNECOLOGY
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
## 2019-09-15 ENCOUNTER — HOSPITAL ENCOUNTER (OUTPATIENT)
Dept: CT IMAGING | Facility: HOSPITAL | Age: 54
Discharge: HOME | End: 2019-09-15
Payer: COMMERCIAL

## 2019-09-15 DIAGNOSIS — R10.30 LOWER ABDOMINAL PAIN: ICD-10-CM

## 2019-09-15 PROCEDURE — 74176 CT ABD & PELVIS W/O CONTRAST: CPT

## 2019-09-17 ENCOUNTER — TELEPHONE (OUTPATIENT)
Dept: GYNECOLOGY | Facility: CLINIC | Age: 54
End: 2019-09-17

## 2019-09-17 NOTE — TELEPHONE ENCOUNTER
Spoke with patient to make her aware of results of CT scan  She states her sx have been improving with dietary changes  I have a call to Dr Luis Garcia to determine if she feels additional imaging is need of the LEFT breast   Patient has an MRI scheduled for October, but they are trying to prior auth

## 2019-09-27 ENCOUNTER — TELEPHONE (OUTPATIENT)
Dept: GYNECOLOGY | Facility: CLINIC | Age: 54
End: 2019-09-27

## 2019-10-03 ENCOUNTER — HOSPITAL ENCOUNTER (OUTPATIENT)
Dept: RADIOLOGY | Facility: HOSPITAL | Age: 54
Discharge: HOME | End: 2019-10-03
Attending: SURGERY
Payer: COMMERCIAL

## 2019-10-03 DIAGNOSIS — Z17.0 MALIGNANT NEOPLASM OF UPPER-OUTER QUADRANT OF RIGHT BREAST IN FEMALE, ESTROGEN RECEPTOR POSITIVE (HCC): ICD-10-CM

## 2019-10-03 DIAGNOSIS — C50.411 MALIGNANT NEOPLASM OF UPPER-OUTER QUADRANT OF RIGHT BREAST IN FEMALE, ESTROGEN RECEPTOR POSITIVE (HCC): ICD-10-CM

## 2019-10-03 PROCEDURE — C8908 MRI W/O FOL W/CONT, BREAST,: HCPCS

## 2019-10-03 PROCEDURE — C8937 CAD BREAST MRI: HCPCS

## 2019-10-03 PROCEDURE — A9585 GADOBUTROL INJECTION: HCPCS | Performed by: SURGERY

## 2019-10-03 RX ADMIN — GADOBUTROL 8 ML: 604.72 INJECTION INTRAVENOUS at 16:43

## 2019-10-25 ENCOUNTER — APPOINTMENT (OUTPATIENT)
Dept: LAB | Age: 54
End: 2019-10-25

## 2019-10-25 ENCOUNTER — OCCMED (OUTPATIENT)
Dept: URGENT CARE | Age: 54
End: 2019-10-25

## 2019-10-25 DIAGNOSIS — Z13.9 SCREENING FOR UNSPECIFIED CONDITION: ICD-10-CM

## 2019-10-25 DIAGNOSIS — Z13.9 SCREENING FOR UNSPECIFIED CONDITION: Primary | ICD-10-CM

## 2019-10-25 LAB
CHOLEST SERPL-MCNC: 193 MG/DL (ref 50–200)
GLUCOSE P FAST SERPL-MCNC: 93 MG/DL (ref 65–99)
HDLC SERPL-MCNC: 67 MG/DL
LDLC SERPL CALC-MCNC: 111 MG/DL (ref 0–100)
NONHDLC SERPL-MCNC: 126 MG/DL
TRIGL SERPL-MCNC: 75 MG/DL

## 2019-10-25 PROCEDURE — 36415 COLL VENOUS BLD VENIPUNCTURE: CPT

## 2019-10-25 PROCEDURE — 80323 ALKALOIDS NOS: CPT

## 2019-10-25 PROCEDURE — 80061 LIPID PANEL: CPT

## 2019-10-25 PROCEDURE — 82947 ASSAY GLUCOSE BLOOD QUANT: CPT

## 2019-10-30 LAB
COTININE SERPL-MCNC: NORMAL NG/ML
NICOTINE SERPL-MCNC: NORMAL NG/ML

## 2020-01-13 DIAGNOSIS — Z17.0 MALIGNANT NEOPLASM OF UPPER-OUTER QUADRANT OF RIGHT BREAST IN FEMALE, ESTROGEN RECEPTOR POSITIVE (HCC): Primary | ICD-10-CM

## 2020-01-13 DIAGNOSIS — C50.411 MALIGNANT NEOPLASM OF UPPER-OUTER QUADRANT OF RIGHT BREAST IN FEMALE, ESTROGEN RECEPTOR POSITIVE (HCC): Primary | ICD-10-CM

## 2020-01-13 RX ORDER — ANASTROZOLE 1 MG/1
1 TABLET ORAL DAILY
Qty: 90 TABLET | Refills: 3 | Status: SHIPPED | OUTPATIENT
Start: 2020-01-13 | End: 2020-04-20 | Stop reason: SDUPTHER

## 2020-02-13 ENCOUNTER — HOSPITAL ENCOUNTER (OUTPATIENT)
Dept: MAMMOGRAPHY | Facility: CLINIC | Age: 55
Discharge: HOME | End: 2020-02-13
Payer: COMMERCIAL

## 2020-02-13 VITALS — WEIGHT: 174 LBS | BODY MASS INDEX: 27.97 KG/M2 | HEIGHT: 66 IN

## 2020-02-13 DIAGNOSIS — C50.411 MALIGNANT NEOPLASM OF UPPER-OUTER QUADRANT OF RIGHT BREAST IN FEMALE, ESTROGEN RECEPTOR POSITIVE (HCC): ICD-10-CM

## 2020-02-13 DIAGNOSIS — Z17.0 MALIGNANT NEOPLASM OF UPPER-OUTER QUADRANT OF RIGHT BREAST IN FEMALE, ESTROGEN RECEPTOR POSITIVE (HCC): ICD-10-CM

## 2020-02-13 PROCEDURE — 77066 DX MAMMO INCL CAD BI: CPT

## 2020-02-13 PROCEDURE — G0279 TOMOSYNTHESIS, MAMMO: HCPCS

## 2020-02-17 ENCOUNTER — OFFICE VISIT (OUTPATIENT)
Dept: SURGICAL ONCOLOGY | Facility: CLINIC | Age: 55
End: 2020-02-17
Payer: COMMERCIAL

## 2020-02-17 VITALS
BODY MASS INDEX: 29.02 KG/M2 | HEIGHT: 66 IN | HEART RATE: 81 BPM | DIASTOLIC BLOOD PRESSURE: 68 MMHG | TEMPERATURE: 98.2 F | WEIGHT: 180.6 LBS | SYSTOLIC BLOOD PRESSURE: 100 MMHG | RESPIRATION RATE: 18 BRPM

## 2020-02-17 DIAGNOSIS — R92.2 DENSE BREAST TISSUE: ICD-10-CM

## 2020-02-17 DIAGNOSIS — Z79.811 USE OF ANASTROZOLE: ICD-10-CM

## 2020-02-17 DIAGNOSIS — Z17.0 MALIGNANT NEOPLASM OF UPPER-OUTER QUADRANT OF RIGHT BREAST IN FEMALE, ESTROGEN RECEPTOR POSITIVE (HCC): Primary | ICD-10-CM

## 2020-02-17 DIAGNOSIS — C50.411 MALIGNANT NEOPLASM OF UPPER-OUTER QUADRANT OF RIGHT BREAST IN FEMALE, ESTROGEN RECEPTOR POSITIVE (HCC): Primary | ICD-10-CM

## 2020-02-17 PROCEDURE — 3008F BODY MASS INDEX DOCD: CPT | Performed by: SURGERY

## 2020-02-17 PROCEDURE — 99214 OFFICE O/P EST MOD 30 MIN: CPT | Performed by: SURGERY

## 2020-02-17 PROCEDURE — 1036F TOBACCO NON-USER: CPT | Performed by: SURGERY

## 2020-02-17 NOTE — PROGRESS NOTES
Surgical Oncology Follow Up       [facility] SURGICAL ONCOLOGY [facility]  [facility] [address]    Art Pontiff  1965  [id number]  [facility] SURGICAL ONCOLOGY [place]  [place] [id number]    Chief Complaint   Patient presents with    Follow-up       Assessment/Plan   Diagnoses and all orders for this visit:    Malignant neoplasm of upper-outer quadrant of right breast in female, estrogen receptor positive ([facility] [address] )    Dense breast tissue    Use of anastrozole        Advance Care Planning/Advance Directives:  Discussed disease status, cancer treatment plans and/or cancer treatment goals with the patient  Oncology History:       Malignant neoplasm of upper-outer quadrant of right breast in female, estrogen receptor positive ([facility] [address] )    3/1/2018 Initial Diagnosis     Malignant neoplasm of upper-outer quadrant of right breast in female, estrogen receptor positive ([facility] [address] )      3/1/2018 Biopsy     RIGHT breast biopsy:  ER/RI negative, HER2 by IHC 3+ positive  Microinvasive carcinoma arising in extensive high-grade ductal carcinoma in-situ (DCIS), grade         4/24/2018 Surgery     LUMPECTOMY BREAST NEEDLE LOCALIZED BRACKET (Right)  BIOPSY LYMPH NODE SENTINEL (Right) - Dr Sneha Ruiz      4/24/2018 -  Cancer Staged     Stage IA - pT1a, pN0(sn), cM0, G2, ER 90-95% positive, RI <1% negative, HER2 by IHC 2+ equivocal; FISH pending  4 mm IDC is largest focus-2 additional 0 8mm foci and extensive DCIS  No LVI  Extensive LCIS  Margins negative for invasive and in situ  Lymph node assessment/status 0/3         5/2018 -  Hormone Therapy     Anastrozole 1 mg daily    - Dr Tobi Rodríguez      6/19/2018 - 7/18/2018 Radiation     Right breast to 4256 cGy in 16 fractions followed by a boost to the lumpectomy cavity of an additional 1000 cGy         - Dr Ronna Still         History of Present Illness:  Breast cancer follow-up, no concerns, continues on anastrozole  -Interval History:  Recent breast imaging    Review of Systems:  Review of Systems   Constitutional: Negative  Negative for appetite change and fever  Eyes: Negative  Respiratory: Negative for shortness of breath  Cardiovascular: Negative  Gastrointestinal: Negative  Endocrine: Negative  Genitourinary: Negative  Musculoskeletal: Negative  Negative for arthralgias and myalgias  Skin:        Recent Mohs   Allergic/Immunologic: Negative  Neurological: Negative  Hematological: Negative  Negative for adenopathy  Does not bruise/bleed easily  Psychiatric/Behavioral: Negative          Patient Active Problem List   Diagnosis    Malignant neoplasm of upper-outer quadrant of right breast in female, estrogen receptor positive ([address] )    Dense breast tissue    Use of anastrozole    Pelvic pain    Basal cell carcinoma (BCC) of skin of nose     Past Medical History:   Diagnosis Date    Anxiety     Basal cell carcinoma (BCC) 2019    nose    Bilateral breast cysts     Depression     Frequent urination     Hashimoto's disease     Hypothyroidism     Night sweats     Ovarian cyst     Palpitations     improved since lowered caffeine intake    Tinnitus     b/l    Viral syndrome      Past Surgical History:   Procedure Laterality Date    BREAST BIOPSY Right 03 01/18    DCIS    BREAST BIOPSY Left 04/2018    benign    BREAST LUMPECTOMY Right 4/24/2018    Procedure: LUMPECTOMY BREAST NEEDLE LOCALIZED BRACKET;  Surgeon: Alan Borden MD;  Location: [facility];  Service: Surgical Oncology    COLONOSCOPY      ESOPHAGOGASTRODUODENOSCOPY      LYMPH NODE BIOPSY Right 4/24/2018    Procedure: BIOPSY LYMPH NODE SENTINEL;  Surgeon: Alan Borden MD;  Location: [facility];  Service: Surgical Oncology    MAMMO NEEDLE LOCALIZATION RIGHT (ALL INC) Right 4/24/2018    MAMMO NEEDLE LOCALIZATION RIGHT (ALL INC) EACH ADD Right 4/24/2018    MAMMO STEREOTACTIC BREAST BIOPSY RIGHT (ALL INC) Right 3/1/2018    MRI BREAST BIOPSY LEFT (ALL INCLUSIVE) Left 4/16/2018    WISDOM TOOTH EXTRACTION       Family History   Problem Relation Age of Onset    Prostate cancer Paternal Uncle 64    No Known Problems Mother     Dementia Father     Heart disease Father     No Known Problems Sister     No Known Problems Maternal Grandmother     No Known Problems Maternal Grandfather     No Known Problems Paternal Grandmother     No Known Problems Paternal Grandfather     No Known Problems Maternal Aunt     No Known Problems Paternal Aunt     No Known Problems Paternal Aunt      Social History     Socioeconomic History    Marital status: /Civil Union     Spouse name: Not on file    Number of children: Not on file    Years of education: Not on file    Highest education level: Not on file   Occupational History    Not on file   Social Needs    Financial resource strain: Not on file    Food insecurity:     Worry: Not on file     Inability: Not on file    Transportation needs:     Medical: Not on file     Non-medical: Not on file   Tobacco Use    Smoking status: Never Smoker    Smokeless tobacco: Never Used   Substance and Sexual Activity    Alcohol use:  Yes     Alcohol/week: 7 0 standard drinks     Types: 7 Standard drinks or equivalent per week     Comment: 7 -8 drinks a week    Drug use: No    Sexual activity: Not on file   Lifestyle    Physical activity:     Days per week: Not on file     Minutes per session: Not on file    Stress: Not on file   Relationships    Social connections:     Talks on phone: Not on file     Gets together: Not on file     Attends Buddhist service: Not on file     Active member of club or organization: Not on file     Attends meetings of clubs or organizations: Not on file     Relationship status: Not on file    Intimate partner violence:     Fear of current or ex partner: Not on file     Emotionally abused: Not on file     Physically abused: Not on file     Forced sexual activity: Not on file   Other Topics Concern    Not on file   Social History Narrative    Not on file       Current Outpatient Medications:     anastrozole (ARIMIDEX) 1 mg tablet, Take 1 tablet (1 mg total) by mouth daily, Disp: 90 tablet, Rfl: 3    Calcium Carb-Cholecalciferol (CALCIUM+D3 PO), Take 1 tablet by mouth daily , Disp: , Rfl:     fluorouracil (EFUDEX) 5 % cream, , Disp: , Rfl:     levothyroxine 88 mcg tablet, Take 1 tablet by mouth daily, Disp: , Rfl:     Multiple Vitamin (MULTIVITAMIN) tablet, Take 1 tablet by mouth daily, Disp: , Rfl:     fluticasone (FLONASE) 50 mcg/act nasal spray, into each nostril, Disp: , Rfl:   Allergies   Allergen Reactions    Clindamycin Other (See Comments)     Was painful to walk     Erythromycin Hives    Sulfamethoxazole-Trimethoprim Hives    Amoxicillin Rash       The following portions of the patient's history were reviewed and updated as appropriate: allergies, current medications, past family history, past medical history, past social history, past surgical history and problem list         Vitals:    02/17/20 1539   BP: 100/68   Pulse: 81   Resp: 18   Temp: 98 2 °F (36 8 °C)       Physical Exam   Constitutional: She is oriented to person, place, and time  She appears well-developed and well-nourished  HENT:   Head: Normocephalic and atraumatic  Cardiovascular: Normal heart sounds  Pulmonary/Chest: Breath sounds normal  Right breast exhibits skin change (Lumpectomy scar)  Right breast exhibits no inverted nipple, no mass, no nipple discharge and no tenderness  Left breast exhibits no inverted nipple, no mass, no nipple discharge, no skin change and no tenderness  Abdominal: Soft  Lymphadenopathy:        Right axillary: No pectoral and no lateral adenopathy present  Left axillary: No pectoral and no lateral adenopathy present  Right: No supraclavicular adenopathy present          Left: No supraclavicular adenopathy present  Neurological: She is alert and oriented to person, place, and time  Psychiatric: She has a normal mood and affect  Results:  Labs:      Imaging  10/03/2019 bilateral breast MRI was benign  02/13/2020 bilateral 3D diagnostic mammogram was benign BI-RADS two with a density of three    I reviewed the above imaging data  Discussion/Summary:  75-year-old female status post right breast conservation for a stage IA invasive ductal carcinoma  She had whole breast radiation  She continues on anastrozole with no concerns  There is no evidence of disease based on examination today  Her recent mammogram was benign  Her breast MRI done in October was also benign  I will see her again in six months or sooner should the need arise

## 2020-03-04 ENCOUNTER — OFFICE VISIT (OUTPATIENT)
Dept: FAMILY MEDICINE CLINIC | Facility: CLINIC | Age: 55
End: 2020-03-04
Payer: COMMERCIAL

## 2020-03-04 VITALS
WEIGHT: 179 LBS | TEMPERATURE: 98.4 F | HEIGHT: 66 IN | SYSTOLIC BLOOD PRESSURE: 90 MMHG | DIASTOLIC BLOOD PRESSURE: 66 MMHG | HEART RATE: 70 BPM | BODY MASS INDEX: 28.77 KG/M2

## 2020-03-04 DIAGNOSIS — E66.3 OVERWEIGHT (BMI 25.0-29.9): ICD-10-CM

## 2020-03-04 DIAGNOSIS — R06.83 SNORING: Primary | ICD-10-CM

## 2020-03-04 DIAGNOSIS — Z11.59 ENCOUNTER FOR HEPATITIS C SCREENING TEST FOR LOW RISK PATIENT: Primary | ICD-10-CM

## 2020-03-04 DIAGNOSIS — Z00.00 WELL ADULT EXAM: ICD-10-CM

## 2020-03-04 PROBLEM — R92.2 DENSE BREAST TISSUE: Status: RESOLVED | Noted: 2018-03-14 | Resolved: 2020-03-04

## 2020-03-04 PROBLEM — R92.30 DENSE BREAST TISSUE: Status: RESOLVED | Noted: 2018-03-14 | Resolved: 2020-03-04

## 2020-03-04 PROCEDURE — 99396 PREV VISIT EST AGE 40-64: CPT | Performed by: FAMILY MEDICINE

## 2020-03-04 NOTE — PATIENT INSTRUCTIONS
Await lab  Weight Management   AMBULATORY CARE:   Why it is important to manage your weight:  Being overweight increases your risk of health conditions such as heart disease, high blood pressure, type 2 diabetes, and certain types of cancer  It can also increase your risk for osteoarthritis, sleep apnea, and other respiratory problems  Aim for a slow, steady weight loss  Even a small amount of weight loss can lower your risk of health problems  How to lose weight safely:  A safe and healthy way to lose weight is to eat fewer calories and get regular exercise  You can lose up about 1 pound a week by decreasing the number of calories you eat by 500 calories each day  You can decrease calories by eating smaller portion sizes or by cutting out high-calorie foods  Read labels to find out how many calories are in the foods you eat  You can also burn calories with exercise such as walking, swimming, or biking  You will be more likely to keep weight off if you make these changes part of your lifestyle  Healthy meal plan for weight management:  A healthy meal plan includes a variety of foods, contains fewer calories, and helps you stay healthy  A healthy meal plan includes the following:  · Eat whole-grain foods more often  A healthy meal plan should contain fiber  Fiber is the part of grains, fruits, and vegetables that is not broken down by your body  Whole-grain foods are healthy and provide extra fiber in your diet  Some examples of whole-grain foods are whole-wheat breads and pastas, oatmeal, brown rice, and bulgur  · Eat a variety of vegetables every day  Include dark, leafy greens such as spinach, kale, cb greens, and mustard greens  Eat yellow and orange vegetables such as carrots, sweet potatoes, and winter squash  · Eat a variety of fruits every day  Choose fresh or canned fruit (canned in its own juice or light syrup) instead of juice  Fruit juice has very little or no fiber      · Eat low-fat dairy foods  Drink fat-free (skim) milk or 1% milk  Eat fat-free yogurt and low-fat cottage cheese  Try low-fat cheeses such as mozzarella and other reduced-fat cheeses  · Choose meat and other protein foods that are low in fat  Choose beans or other legumes such as split peas or lentils  Choose fish, skinless poultry (chicken or turkey), or lean cuts of red meat (beef or pork)  Before you cook meat or poultry, cut off any visible fat  · Use less fat and oil  Try baking foods instead of frying them  Add less fat, such as margarine, sour cream, regular salad dressing and mayonnaise to foods  Eat fewer high-fat foods  Some examples of high-fat foods include french fries, doughnuts, ice cream, and cakes  · Eat fewer sweets  Limit foods and drinks that are high in sugar  This includes candy, cookies, regular soda, and sweetened drinks  Ways to decrease calories:   · Eat smaller portions  ¨ Use a small plate with smaller servings  ¨ Do not eat second helpings  ¨ When you eat at a restaurant, ask for a box and place half of your meal in the box before you eat  ¨ Share an entrée with someone else  · Replace high-calorie snacks with healthy, low-calorie snacks  ¨ Choose fresh fruit, vegetables, fat-free rice cakes, or air-popped popcorn instead of potato chips, nuts, or chocolate  ¨ Choose water or calorie-free drinks instead of soda or sweetened drinks  · Eat regular meals  Skipping meals can lead to overeating later in the day  Eat a healthy snack in place of a meal if you do not have time to eat a regular meal      · Do not shop for groceries when you are hungry  You may be more likely to make unhealthy food choices  Take a grocery list of healthy foods and shop after you have eaten  Exercise:  Exercise at least 30 minutes per day on most days of the week  Some examples of exercise include walking, biking, dancing, and swimming   You can also fit in more physical activity by taking the stairs instead of the elevator or parking farther away from stores  Ask your healthcare provider about the best exercise plan for you  Other things to consider as you try to lose weight:   · Be aware of situations that may give you the urge to overeat, such as eating while watching television  Find ways to avoid these situations  For example, read a book, go for a walk, or do crafts  · Meet with a weight loss support group or friends who are also trying to lose weight  This may help you stay motivated to continue working on your weight loss goals  © 2017 [employer] Information is for End User's use only and may not be sold, redistributed or otherwise used for commercial purposes  All illustrations and images included in CareNotes® are the copyrighted property of [employer]  or [employer]  The above information is an  only  It is not intended as medical advice for individual conditions or treatments  Talk to your doctor, nurse or pharmacist before following any medical regimen to see if it is safe and effective for you

## 2020-03-04 NOTE — PROGRESS NOTES
Assessment/Plan     Healthy female exam      1  Retiring from [employer] in May, wants physical before retiring, [employer] not as good, tries to do gym twice per week  2  Patient Counseling:  --Nutrition: Stressed importance of moderation in sodium/caffeine intake, saturated fat and cholesterol, caloric balance, sufficient intake of fresh fruits, vegetables, fiber, calcium  --Discussed the issue of  calcium supplement, and the daily use of baby aspirin  --Exercise: Stressed the importance of regular exercise  --Substance Abuse: 4-6 drinks/week   --Sexuality: no concerns  --Injury prevention: Discussed safety belts, safety helmets, smoke detector,   --Dental health: Discussed importance of regular tooth brushing, flossing, and dental visits  --Immunizations reviewed -got shingles shot, rec tetanus shot  --Discussed benefits of screening colonoscopy -had age 46 as well as endoscopy  --After hours service discussed with patient    3  Discussed the patient's BMI with her  The BMI is above average; BMI management plan is completed  4  Follow up as needed for acute illness  Subjective     Pradip Harp is a 62 y o  female and is here for a comprehensive physical exam  The patient reports no problems  Do you take any herbs or supplements that were not prescribed by a doctor? no  Are you taking calcium supplements? yes  Are you taking aspirin daily? no     History:  LMP: No LMP recorded (lmp unknown)  Patient is postmenopausal   Menopause at 46 years  Last pap date: 2014  Abnormal pap? no  : 1  Para: 0    The following portions of the patient's history were reviewed and updated as appropriate: allergies, current medications, past family history, past medical history, past social history, past surgical history and problem list   no concerns    Review of Systems  Do you have pain that bothers you in your daily life? no  no concerns      Objective     BP 90/66 (BP Location: Left arm, Patient Position: Sitting, Cuff Size: Large)   Pulse 70   Temp 98 4 °F (36 9 °C) (Tympanic)   Ht 5' 6" (1 676 m)   Wt 81 2 kg (179 lb)   LMP  (LMP Unknown)   BMI 28 89 kg/m²     General Appearance:    Alert, cooperative, no distress, appears stated age   Head:    Normocephalic, without obvious abnormality, atraumatic   Eyes:    PERRL, conjunctiva/corneas clear, EOM's intact, fundi     benign, both eyes   Ears:    Normal TM's and external ear canals, both ears   Nose:   Nares normal, septum midline, mucosa normal, no drainage    or sinus tenderness   Throat:   Lips, mucosa, and tongue normal; teeth and gums normal   Neck:   Supple, symmetrical, trachea midline, no adenopathy;     thyroid:  no enlargement/tenderness/nodules; no carotid    bruit or JVD   Back:     Symmetric, no curvature, ROM normal, no CVA tenderness   Lungs:     Clear to auscultation bilaterally, respirations unlabored   Chest Wall:    No tenderness or deformity    Heart:    Regular rate and rhythm, S1 and S2 normal, no murmur, rub   or gallop   Breast Exam:       Abdomen:     Soft, non-tender, bowel sounds active all four quadrants,     no masses, no organomegaly   Genitalia:     Rectal:     Extremities:   Extremities normal, atraumatic, no cyanosis or edema   Pulses:   2+ and symmetric all extremities   Skin:   Skin color, texture, turgor normal, no rashes or lesions   Lymph nodes:   Cervical, supraclavicular, and axillary nodes normal   Neurologic:   CNII-XII intact, normal strength, sensation and reflexes     throughout     BMI Counseling: Body mass index is 28 89 kg/m²  The BMI is above normal  Nutrition recommendations include reducing portion sizes, decreasing overall calorie intake, 3-5 servings of fruits/vegetables daily, reducing fast food intake and consuming healthier snacks  Exercise recommendations include exercising 3-5 times per week

## 2020-03-17 ENCOUNTER — TELEPHONE (OUTPATIENT)
Dept: SLEEP CENTER | Facility: CLINIC | Age: 55
End: 2020-03-17

## 2020-03-17 LAB
ALBUMIN SERPL-MCNC: 4.1 G/DL (ref 3.6–5.1)
ALBUMIN/GLOB SERPL: 1.5 (CALC) (ref 1–2.5)
ALP SERPL-CCNC: 84 U/L (ref 37–153)
ALT SERPL-CCNC: 34 U/L (ref 6–29)
APPEARANCE UR: CLEAR
AST SERPL-CCNC: 29 U/L (ref 10–35)
BASOPHILS # BLD AUTO: 67 CELLS/UL (ref 0–200)
BASOPHILS NFR BLD AUTO: 1.1 %
BILIRUB SERPL-MCNC: 0.5 MG/DL (ref 0.2–1.2)
BILIRUB UR QL STRIP: NEGATIVE
BUN SERPL-MCNC: 18 MG/DL (ref 7–25)
BUN/CREAT SERPL: ABNORMAL (CALC) (ref 6–22)
CALCIUM SERPL-MCNC: 9.4 MG/DL (ref 8.6–10.4)
CHLORIDE SERPL-SCNC: 105 MMOL/L (ref 98–110)
CHOLEST SERPL-MCNC: 183 MG/DL
CHOLEST/HDLC SERPL: 2.8 (CALC)
CO2 SERPL-SCNC: 30 MMOL/L (ref 20–32)
COLOR UR: YELLOW
CREAT SERPL-MCNC: 0.75 MG/DL (ref 0.5–1.05)
EOSINOPHIL # BLD AUTO: 232 CELLS/UL (ref 15–500)
EOSINOPHIL NFR BLD AUTO: 3.8 %
ERYTHROCYTE [DISTWIDTH] IN BLOOD BY AUTOMATED COUNT: 12.2 % (ref 11–15)
GLOBULIN SER CALC-MCNC: 2.8 G/DL (CALC) (ref 1.9–3.7)
GLUCOSE SERPL-MCNC: 99 MG/DL (ref 65–99)
GLUCOSE UR QL STRIP: NEGATIVE
HCT VFR BLD AUTO: 40 % (ref 35–45)
HCV AB S/CO SERPL IA: 0.02
HCV AB SERPL QL IA: NORMAL
HDLC SERPL-MCNC: 65 MG/DL
HGB BLD-MCNC: 13.4 G/DL (ref 11.7–15.5)
HGB UR QL STRIP: NEGATIVE
KETONES UR QL STRIP: NEGATIVE
LDLC SERPL CALC-MCNC: 102 MG/DL (CALC)
LEUKOCYTE ESTERASE UR QL STRIP: NEGATIVE
LYMPHOCYTES # BLD AUTO: 1952 CELLS/UL (ref 850–3900)
LYMPHOCYTES NFR BLD AUTO: 32 %
MCH RBC QN AUTO: 29.5 PG (ref 27–33)
MCHC RBC AUTO-ENTMCNC: 33.5 G/DL (ref 32–36)
MCV RBC AUTO: 88.1 FL (ref 80–100)
MONOCYTES # BLD AUTO: 628 CELLS/UL (ref 200–950)
MONOCYTES NFR BLD AUTO: 10.3 %
NEUTROPHILS # BLD AUTO: 3221 CELLS/UL (ref 1500–7800)
NEUTROPHILS NFR BLD AUTO: 52.8 %
NITRITE UR QL STRIP: NEGATIVE
NONHDLC SERPL-MCNC: 118 MG/DL (CALC)
PH UR STRIP: 6 [PH] (ref 5–8)
PLATELET # BLD AUTO: 222 THOUSAND/UL (ref 140–400)
PMV BLD REES-ECKER: 11.4 FL (ref 7.5–12.5)
POTASSIUM SERPL-SCNC: 4 MMOL/L (ref 3.5–5.3)
PROT SERPL-MCNC: 6.9 G/DL (ref 6.1–8.1)
PROT UR QL STRIP: NEGATIVE
RBC # BLD AUTO: 4.54 MILLION/UL (ref 3.8–5.1)
SL AMB EGFR AFRICAN AMERICAN: 103 ML/MIN/1.73M2
SL AMB EGFR NON AFRICAN AMERICAN: 89 ML/MIN/1.73M2
SODIUM SERPL-SCNC: 140 MMOL/L (ref 135–146)
SP GR UR STRIP: 1.01 (ref 1–1.03)
TRIGL SERPL-MCNC: 71 MG/DL
WBC # BLD AUTO: 6.1 THOUSAND/UL (ref 3.8–10.8)

## 2020-03-24 ENCOUNTER — OFFICE VISIT (OUTPATIENT)
Dept: SLEEP CENTER | Facility: CLINIC | Age: 55
End: 2020-03-24
Payer: COMMERCIAL

## 2020-03-24 VITALS
BODY MASS INDEX: 28.45 KG/M2 | HEART RATE: 62 BPM | WEIGHT: 177 LBS | SYSTOLIC BLOOD PRESSURE: 84 MMHG | DIASTOLIC BLOOD PRESSURE: 60 MMHG | HEIGHT: 66 IN

## 2020-03-24 DIAGNOSIS — R06.83 SNORING: ICD-10-CM

## 2020-03-24 PROCEDURE — 99244 OFF/OP CNSLTJ NEW/EST MOD 40: CPT | Performed by: INTERNAL MEDICINE

## 2020-03-24 NOTE — PROGRESS NOTES
Consultation - [facility] Christiano Carter : 1965  MRN: [id number]      Assessment:  The patient's symptoms could suggest obstructive sleep apnea  She has mild daytime sleepiness in the form of unrefreshing sleep and has snoring as well  Plan:  Home sleep apnea testing    Follow up: After testing  The patient may be a candidate for positive airway pressure therapy or mandibular airway device  History of Present Illness:   62 y  o female with a longstanding history of mild snoring without witnessed apneas  She has on refreshing sleep, but does not fall asleep during the day and appropriately  She denies any history of hypertension or other cardiovascular disease  She denies awakening with choking sensation or any respiratory complaints  She urinates once or twice a night        Review of Systems      Genitourinary need to urinate more than twice a night, hot flashes at night and post menopausal (no peroids)   Cardiology none   Gastrointestinal abdominal pain or cramping that disturb sleep    Neurology none   Constitutional none   Integumentary none   Psychiatry none   Musculoskeletal none   Pulmonary snoring   ENT ringing in ears   Endocrine frequent urination   Hematological none         I have reviewed and updated the review of systems as necessary    Historical Information    Past Medical History:  History of tinnitus, breast cancer    Family History: non-contributory    Social History     Socioeconomic History    Marital status: /Civil Union     Spouse name: None    Number of children: None    Years of education: None    Highest education level: None   Occupational History    None   Social Needs    Financial resource strain: None    Food insecurity:     Worry: None     Inability: None    Transportation needs:     Medical: None     Non-medical: None   Tobacco Use    Smoking status: Never Smoker    Smokeless tobacco: Never Used   Substance and Sexual Activity    Alcohol use: Yes     Alcohol/week: 7 0 standard drinks     Types: 7 Standard drinks or equivalent per week     Comment: 7 -8 drinks a week    Drug use: No    Sexual activity: None   Lifestyle    Physical activity:     Days per week: None     Minutes per session: None    Stress: None   Relationships    Social connections:     Talks on phone: None     Gets together: None     Attends Mosque service: None     Active member of club or organization: None     Attends meetings of clubs or organizations: None     Relationship status: None    Intimate partner violence:     Fear of current or ex partner: None     Emotionally abused: None     Physically abused: None     Forced sexual activity: None   Other Topics Concern    None   Social History Narrative    None         Sleep Schedule: unremarkable    Snoring:  Yes    Witnessed Apnea:  No    Medications/Allergies:    Current Outpatient Medications:     anastrozole (ARIMIDEX) 1 mg tablet, Take 1 tablet (1 mg total) by mouth daily, Disp: 90 tablet, Rfl: 3    Calcium Carb-Cholecalciferol (CALCIUM+D3 PO), Take 1 tablet by mouth daily , Disp: , Rfl:     fluorouracil (EFUDEX) 5 % cream, , Disp: , Rfl:     fluticasone (FLONASE) 50 mcg/act nasal spray, into each nostril, Disp: , Rfl:     levothyroxine 88 mcg tablet, Take 1 tablet by mouth daily, Disp: , Rfl:     Multiple Vitamin (MULTIVITAMIN) tablet, Take 1 tablet by mouth daily, Disp: , Rfl:         No notes on file                  Objective:    Vital Signs:   Vitals:    03/24/20 1010   BP: (!) 84/60   Pulse: 62   Weight: 80 3 kg (177 lb)   Height: 5' 6" (1 676 m)     Neck Circumference: 15      [place] Sleepiness Scale:  Total score: 3    Physical Exam:    General: Alert, appropriate, cooperative, overweight    Head: NC/AT, no retrognathia    Nose: No septal deviation    Throat: Airway normal, tongue base normal, no tonsils visualized    Neuro: No motor abnormalities, cranial nerves appear intact      Counseling / Coordination of Care  A description of the counseling / coordination of care: We discussed the pathophysiology of obstructive sleep apnea as well as the possible treatment options  We also discussed the rationale for positive airway pressure therapy  Board Certified Sleep Specialist    Portions of the record may have been created with voice recognition software  Occasional wrong word or "sound a like" substitutions may have occurred due to the inherent limitations of voice recognition software  Read the chart carefully and recognize, using context, where substitutions have occurred

## 2020-03-27 ENCOUNTER — HOSPITAL ENCOUNTER (OUTPATIENT)
Dept: SLEEP CENTER | Facility: CLINIC | Age: 55
Discharge: HOME | End: 2020-03-27

## 2020-03-27 DIAGNOSIS — R06.83 SNORING: ICD-10-CM

## 2020-03-28 NOTE — PROGRESS NOTES
Home Sleep Study Documentation    Pre-Sleep Home Study:    Set-up and instructions performed by: Luz Maria Benz    Technician performed demonstration for Patient: yes    Return demonstration performed by Patient: yes    Written instructions provided to Patient: yes    Patient signed consent form: yes        Post-Sleep Home Study:    Additional comments by Patient: None    Home Sleep Study Failed:yes:  Pulse ox failed to pair with T3 unit    Failure reason: Failed study device    Reported or Detected: detected    Scored by: DARA Jacques

## 2020-04-13 ENCOUNTER — HOSPITAL ENCOUNTER (OUTPATIENT)
Dept: SLEEP CENTER | Facility: CLINIC | Age: 55
Discharge: HOME | End: 2020-04-13
Payer: COMMERCIAL

## 2020-04-13 PROCEDURE — G0399 HOME SLEEP TEST/TYPE 3 PORTA: HCPCS

## 2020-04-18 DIAGNOSIS — G47.33 OSA (OBSTRUCTIVE SLEEP APNEA): Primary | ICD-10-CM

## 2020-04-20 ENCOUNTER — TELEMEDICINE (OUTPATIENT)
Dept: HEMATOLOGY ONCOLOGY | Facility: CLINIC | Age: 55
End: 2020-04-20
Payer: COMMERCIAL

## 2020-04-20 DIAGNOSIS — C50.411 MALIGNANT NEOPLASM OF UPPER-OUTER QUADRANT OF RIGHT BREAST IN FEMALE, ESTROGEN RECEPTOR POSITIVE (HCC): Primary | ICD-10-CM

## 2020-04-20 DIAGNOSIS — Z17.0 MALIGNANT NEOPLASM OF UPPER-OUTER QUADRANT OF RIGHT BREAST IN FEMALE, ESTROGEN RECEPTOR POSITIVE (HCC): Primary | ICD-10-CM

## 2020-04-20 PROCEDURE — 99441 PR PHYS/QHP TELEPHONE EVALUATION 5-10 MIN: CPT | Performed by: INTERNAL MEDICINE

## 2020-04-20 RX ORDER — ANASTROZOLE 1 MG/1
1 TABLET ORAL DAILY
Qty: 90 TABLET | Refills: 3 | Status: SHIPPED | OUTPATIENT
Start: 2020-04-20 | End: 2021-04-19 | Stop reason: SDUPTHER

## 2020-04-23 ENCOUNTER — TELEPHONE (OUTPATIENT)
Dept: SLEEP CENTER | Facility: CLINIC | Age: 55
End: 2020-04-23

## 2020-04-29 ENCOUNTER — TELEMEDICINE (OUTPATIENT)
Dept: SLEEP CENTER | Facility: CLINIC | Age: 55
End: 2020-04-29
Payer: COMMERCIAL

## 2020-04-29 DIAGNOSIS — G47.33 OSA (OBSTRUCTIVE SLEEP APNEA): Primary | ICD-10-CM

## 2020-04-29 PROCEDURE — 1036F TOBACCO NON-USER: CPT | Performed by: INTERNAL MEDICINE

## 2020-04-29 PROCEDURE — 99213 OFFICE O/P EST LOW 20 MIN: CPT | Performed by: INTERNAL MEDICINE

## 2020-05-04 ENCOUNTER — OFFICE VISIT (OUTPATIENT)
Dept: FAMILY MEDICINE CLINIC | Facility: CLINIC | Age: 55
End: 2020-05-04
Payer: COMMERCIAL

## 2020-05-04 VITALS
BODY MASS INDEX: 28.32 KG/M2 | DIASTOLIC BLOOD PRESSURE: 64 MMHG | HEART RATE: 72 BPM | WEIGHT: 176.2 LBS | HEIGHT: 66 IN | SYSTOLIC BLOOD PRESSURE: 92 MMHG

## 2020-05-04 DIAGNOSIS — Z17.0 MALIGNANT NEOPLASM OF UPPER-OUTER QUADRANT OF RIGHT BREAST IN FEMALE, ESTROGEN RECEPTOR POSITIVE (HCC): ICD-10-CM

## 2020-05-04 DIAGNOSIS — E66.3 OVERWEIGHT: ICD-10-CM

## 2020-05-04 DIAGNOSIS — M79.641 PAIN OF RIGHT HAND: Primary | ICD-10-CM

## 2020-05-04 DIAGNOSIS — C50.411 MALIGNANT NEOPLASM OF UPPER-OUTER QUADRANT OF RIGHT BREAST IN FEMALE, ESTROGEN RECEPTOR POSITIVE (HCC): ICD-10-CM

## 2020-05-04 DIAGNOSIS — M65.9 TENOSYNOVITIS OF RIGHT HAND: ICD-10-CM

## 2020-05-04 DIAGNOSIS — M65.311 TRIGGER FINGER OF RIGHT THUMB: ICD-10-CM

## 2020-05-04 PROCEDURE — 3008F BODY MASS INDEX DOCD: CPT | Performed by: FAMILY MEDICINE

## 2020-05-04 PROCEDURE — 99214 OFFICE O/P EST MOD 30 MIN: CPT | Performed by: FAMILY MEDICINE

## 2020-05-04 PROCEDURE — 1036F TOBACCO NON-USER: CPT | Performed by: FAMILY MEDICINE

## 2020-05-04 RX ORDER — PREDNISONE 20 MG/1
TABLET ORAL
Qty: 20 TABLET | Refills: 0 | Status: SHIPPED | OUTPATIENT
Start: 2020-05-04 | End: 2020-05-14

## 2020-05-11 ENCOUNTER — OFFICE VISIT (OUTPATIENT)
Dept: OBGYN CLINIC | Facility: MEDICAL CENTER | Age: 55
End: 2020-05-11
Payer: COMMERCIAL

## 2020-05-11 VITALS
BODY MASS INDEX: 28.12 KG/M2 | WEIGHT: 175 LBS | HEIGHT: 66 IN | HEART RATE: 68 BPM | SYSTOLIC BLOOD PRESSURE: 100 MMHG | DIASTOLIC BLOOD PRESSURE: 69 MMHG

## 2020-05-11 DIAGNOSIS — M65.311 TRIGGER FINGER OF RIGHT THUMB: ICD-10-CM

## 2020-05-11 DIAGNOSIS — M79.641 PAIN OF RIGHT HAND: ICD-10-CM

## 2020-05-11 DIAGNOSIS — M65.9 TENOSYNOVITIS OF RIGHT HAND: ICD-10-CM

## 2020-05-11 PROCEDURE — 20550 NJX 1 TENDON SHEATH/LIGAMENT: CPT | Performed by: ORTHOPAEDIC SURGERY

## 2020-05-11 PROCEDURE — 99244 OFF/OP CNSLTJ NEW/EST MOD 40: CPT | Performed by: ORTHOPAEDIC SURGERY

## 2020-05-11 RX ORDER — BETAMETHASONE SODIUM PHOSPHATE AND BETAMETHASONE ACETATE 3; 3 MG/ML; MG/ML
3 INJECTION, SUSPENSION INTRA-ARTICULAR; INTRALESIONAL; INTRAMUSCULAR; SOFT TISSUE
Status: COMPLETED | OUTPATIENT
Start: 2020-05-11 | End: 2020-05-11

## 2020-05-11 RX ORDER — LIDOCAINE HYDROCHLORIDE 10 MG/ML
0.5 INJECTION, SOLUTION INFILTRATION; PERINEURAL
Status: COMPLETED | OUTPATIENT
Start: 2020-05-11 | End: 2020-05-11

## 2020-05-11 RX ADMIN — BETAMETHASONE SODIUM PHOSPHATE AND BETAMETHASONE ACETATE 3 MG: 3; 3 INJECTION, SUSPENSION INTRA-ARTICULAR; INTRALESIONAL; INTRAMUSCULAR; SOFT TISSUE at 13:29

## 2020-05-11 RX ADMIN — LIDOCAINE HYDROCHLORIDE 0.5 ML: 10 INJECTION, SOLUTION INFILTRATION; PERINEURAL at 13:29

## 2020-08-17 ENCOUNTER — OFFICE VISIT (OUTPATIENT)
Dept: SURGICAL ONCOLOGY | Facility: CLINIC | Age: 55
End: 2020-08-17
Payer: COMMERCIAL

## 2020-08-17 VITALS
HEART RATE: 75 BPM | SYSTOLIC BLOOD PRESSURE: 104 MMHG | WEIGHT: 177.8 LBS | TEMPERATURE: 98.6 F | HEIGHT: 66 IN | BODY MASS INDEX: 28.57 KG/M2 | DIASTOLIC BLOOD PRESSURE: 70 MMHG

## 2020-08-17 DIAGNOSIS — Z17.0 MALIGNANT NEOPLASM OF UPPER-OUTER QUADRANT OF RIGHT BREAST IN FEMALE, ESTROGEN RECEPTOR POSITIVE (HCC): Primary | ICD-10-CM

## 2020-08-17 DIAGNOSIS — R92.2 DENSE BREAST TISSUE: ICD-10-CM

## 2020-08-17 DIAGNOSIS — Z79.811 USE OF ANASTROZOLE: ICD-10-CM

## 2020-08-17 DIAGNOSIS — C50.411 MALIGNANT NEOPLASM OF UPPER-OUTER QUADRANT OF RIGHT BREAST IN FEMALE, ESTROGEN RECEPTOR POSITIVE (HCC): Primary | ICD-10-CM

## 2020-08-17 PROCEDURE — 3008F BODY MASS INDEX DOCD: CPT | Performed by: SURGERY

## 2020-08-17 PROCEDURE — 1036F TOBACCO NON-USER: CPT | Performed by: SURGERY

## 2020-08-17 PROCEDURE — 99214 OFFICE O/P EST MOD 30 MIN: CPT | Performed by: SURGERY

## 2020-08-17 NOTE — PROGRESS NOTES
Surgical Oncology Follow Up       [facility]  [facility] [id number]    Jasmin Chow  1965  [id number]  [facility] SURGICAL ONCOLOGY [place]  [place] [id number]    Chief Complaint   Patient presents with    Follow-up       Assessment/Plan   Diagnoses and all orders for this visit:    Malignant neoplasm of upper-outer quadrant of right breast in female, estrogen receptor positive ([facility] [address] )  -     Mammo diagnostic bilateral w 3d & cad; Future    Dense breast tissue    Use of anastrozole        Advance Care Planning/Advance Directives:  Discussed disease status, cancer treatment plans and/or cancer treatment goals with the patient  Oncology History:    Oncology History   Malignant neoplasm of upper-outer quadrant of right breast in female, estrogen receptor positive ([facility] [address] )   3/1/2018 Initial Diagnosis    Malignant neoplasm of upper-outer quadrant of right breast in female, estrogen receptor positive ([facility] [address] )     3/1/2018 Biopsy    RIGHT breast biopsy:  ER/NH negative, HER2 by IHC 3+ positive  Microinvasive carcinoma arising in extensive high-grade ductal carcinoma in-situ (DCIS), grade        4/24/2018 Surgery    LUMPECTOMY BREAST NEEDLE LOCALIZED BRACKET (Right)  BIOPSY LYMPH NODE SENTINEL (Right) - Dr Tiffanie Aviles     4/24/2018 -  Cancer Staged    Stage IA - pT1a, pN0(sn), cM0, G2, ER 90-95% positive, NH <1% negative, HER2 by IHC 2+ equivocal; FISH pending  4 mm IDC is largest focus-2 additional 0 8mm foci and extensive DCIS  No LVI  Extensive LCIS  Margins negative for invasive and in situ  Lymph node assessment/status 0/3        5/2018 -  Hormone Therapy    Anastrozole 1 mg daily    - Dr Edison Oh     6/19/2018 - 7/18/2018 Radiation    Right breast to 4256 cGy in 16 fractions followed by a boost to the lumpectomy cavity of an additional 1000 cGy         - Dr Bautista Handy         History of Present Illness:  Breast cancer follow-up, no concerns, continues on anastrozole  -Interval History:  None    Review of Systems:  Review of Systems   Constitutional: Negative  Negative for appetite change and fever  Eyes: Negative  Respiratory: Negative for shortness of breath  Cardiovascular: Negative  Gastrointestinal: Negative  Endocrine: Negative  Genitourinary: Negative  Musculoskeletal: Negative  Negative for arthralgias and myalgias  Skin: Negative  Allergic/Immunologic: Negative  Neurological: Negative  Hematological: Negative  Negative for adenopathy  Does not bruise/bleed easily  Psychiatric/Behavioral: Negative          Patient Active Problem List   Diagnosis    Malignant neoplasm of upper-outer quadrant of right breast in female, estrogen receptor positive ([facility] [address] )    Dense breast tissue    Use of anastrozole    Pelvic pain    Basal cell carcinoma (BCC) of skin of nose    Acquired hypothyroidism    HEIKE (obstructive sleep apnea)    Overweight     Past Medical History:   Diagnosis Date    Anxiety     Basal cell carcinoma (BCC) 2019    nose    Bilateral breast cysts     Depression     Frequent urination     Hashimoto's disease     Hypothyroidism     Night sweats     Ovarian cyst     Palpitations     improved since lowered caffeine intake    Tinnitus     b/l    Viral syndrome      Past Surgical History:   Procedure Laterality Date    BREAST BIOPSY Right 03 01/18    DCIS    BREAST BIOPSY Left 04/2018    benign    BREAST LUMPECTOMY Right 4/24/2018    Procedure: LUMPECTOMY BREAST NEEDLE LOCALIZED BRACKET;  Surgeon: Antonino Yang MD;  [facility];  Service: Surgical Oncology    COLONOSCOPY      ESOPHAGOGASTRODUODENOSCOPY      LYMPH NODE BIOPSY Right 4/24/2018    Procedure: BIOPSY LYMPH NODE SENTINEL;  Surgeon: Antonino Yang MD;  [facility];  Service: Surgical Oncology    MAMMO NEEDLE LOCALIZATION RIGHT (ALL INC) Right 4/24/2018    MAMMO NEEDLE LOCALIZATION RIGHT (ALL INC) EACH ADD Right 4/24/2018    MAMMO STEREOTACTIC BREAST BIOPSY RIGHT (ALL INC) Right 3/1/2018    MRI BREAST BIOPSY LEFT (ALL INCLUSIVE) Left 4/16/2018    WISDOM TOOTH EXTRACTION       Family History   Problem Relation Age of Onset    Prostate cancer Paternal Uncle 64   Clifm Dwayne Anemia Mother     Kidney disease Mother     Dementia Father     Heart disease Father     Diabetes Father     Glaucoma Father     Hypothyroidism Father     No Known Problems Sister     No Known Problems Maternal Grandmother     No Known Problems Maternal Grandfather     No Known Problems Paternal Grandmother     No Known Problems Paternal Grandfather     No Known Problems Maternal Aunt     No Known Problems Paternal Aunt     No Known Problems Paternal Aunt     Hypothyroidism Brother      Social History     Socioeconomic History    Marital status: /Civil Union     Spouse name: Not on file    Number of children: Not on file    Years of education: Not on file    Highest education level: Not on file   Occupational History    Not on file   Social Needs    Financial resource strain: Not on file    Food insecurity     Worry: Not on file     Inability: Not on file    Transportation needs     Medical: Not on file     Non-medical: Not on file   Tobacco Use    Smoking status: Never Smoker    Smokeless tobacco: Never Used   Substance and Sexual Activity    Alcohol use:  Yes     Alcohol/week: 7 0 standard drinks     Types: 7 Standard drinks or equivalent per week     Comment: 7 -8 drinks a week    Drug use: No    Sexual activity: Not on file   Lifestyle    Physical activity     Days per week: Not on file     Minutes per session: Not on file    Stress: Not on file   Relationships    Social connections     Talks on phone: Not on file     Gets together: Not on file     Attends Mandaeism service: Not on file     Active member of club or organization: Not on file     Attends meetings of clubs or organizations: Not on file     Relationship status: Not on file    Intimate partner violence     Fear of current or ex partner: Not on file     Emotionally abused: Not on file     Physically abused: Not on file     Forced sexual activity: Not on file   Other Topics Concern    Not on file   Social History Narrative    Not on file       Current Outpatient Medications:     anastrozole (ARIMIDEX) 1 mg tablet, Take 1 tablet (1 mg total) by mouth daily, Disp: 90 tablet, Rfl: 3    Calcium Carb-Cholecalciferol (CALCIUM+D3 PO), Take 1 tablet by mouth daily , Disp: , Rfl:     levothyroxine 88 mcg tablet, Take 1 tablet by mouth daily, Disp: , Rfl:     Multiple Vitamin (MULTIVITAMIN) tablet, Take 1 tablet by mouth daily, Disp: , Rfl:     fluorouracil (EFUDEX) 5 % cream, , Disp: , Rfl:     fluticasone (FLONASE) 50 mcg/act nasal spray, into each nostril, Disp: , Rfl:   Allergies   Allergen Reactions    Clindamycin Other (See Comments)     Was painful to walk     Erythromycin Hives    Sulfamethoxazole-Trimethoprim Hives    Amoxicillin Rash       The following portions of the patient's history were reviewed and updated as appropriate: allergies, current medications, past family history, past medical history, past social history, past surgical history and problem list         Vitals:    08/17/20 1518   BP: 104/70   Pulse: 75   Temp: 98 6 °F (37 °C)       Physical Exam  Constitutional:       General: She is not in acute distress  Appearance: Normal appearance  She is well-developed  HENT:      Head: Normocephalic and atraumatic  Cardiovascular:      Heart sounds: Normal heart sounds  Pulmonary:      Breath sounds: Normal breath sounds  Chest:      Breasts:         Right: Skin change (Lumpectomy scar) present  No inverted nipple, mass, nipple discharge or tenderness  Left: No inverted nipple, mass, nipple discharge, skin change or tenderness  Abdominal:      Palpations: Abdomen is soft  Lymphadenopathy:      Upper Body:      Right upper body: No supraclavicular, axillary or pectoral adenopathy  Left upper body: No supraclavicular, axillary or pectoral adenopathy  Neurological:      Mental Status: She is alert and oriented to person, place, and time  Psychiatric:         Mood and Affect: Mood normal            Results:  Labs:      Imaging  02/13/2020 bilateral 3D diagnostic mammogram is benign BI-RADS two with a density of three    I reviewed the above imaging data  Discussion/Summary:  49-year-old female status post right breast conservation for a stage IA invasive ductal carcinoma  She continues on anastrozole  There is no evidence of disease based on examination today  Her last mammogram was benign  She would like to forego the breast MRI secondary to her high deductible as  I will therefore make arrangements for her mammogram due in February  I will see her again at that time  We can always pick the MRI up in the future  I advised her to return sooner should the need arise

## 2020-09-10 ENCOUNTER — TELEMEDICINE (OUTPATIENT)
Dept: RADIATION ONCOLOGY | Facility: CLINIC | Age: 55
End: 2020-09-10
Attending: RADIOLOGY

## 2020-09-10 ENCOUNTER — CLINICAL SUPPORT (OUTPATIENT)
Dept: RADIATION ONCOLOGY | Facility: CLINIC | Age: 55
End: 2020-09-10
Attending: RADIOLOGY

## 2020-09-10 VITALS — BODY MASS INDEX: 27.8 KG/M2 | HEIGHT: 66 IN | WEIGHT: 173 LBS

## 2020-09-10 DIAGNOSIS — Z17.0 MALIGNANT NEOPLASM OF UPPER-OUTER QUADRANT OF RIGHT BREAST IN FEMALE, ESTROGEN RECEPTOR POSITIVE (HCC): Primary | ICD-10-CM

## 2020-09-10 DIAGNOSIS — C50.411 MALIGNANT NEOPLASM OF UPPER-OUTER QUADRANT OF RIGHT BREAST IN FEMALE, ESTROGEN RECEPTOR POSITIVE (HCC): Primary | ICD-10-CM

## 2020-09-10 NOTE — PROGRESS NOTES
Virtual Regular Visit    Problem List Items Addressed This Visit     None               Reason for visit is radiation oncology follow-up    Encounter provider Vaishali Paez MD    Provider located at [address] [place] [id number]    Recent Visits  No visits were found meeting these conditions  Showing recent visits within past 7 days and meeting all other requirements     Today's Visits  Date Type Provider Dept   09/10/20 Telemedicine Vaishali Paez MD [facility]   Showing today's visits and meeting all other requirements     Future Appointments  No visits were found meeting these conditions  Showing future appointments within next 150 days and meeting all other requirements        After connecting through [employer], the patient was identified by name and date of birth  Barb Cabrera was informed that this is a telemedicine visit and that the visit is being conducted through [employer] which may not be secure and therefore, might not be HIPAA-compliant  My office door was closed  No one else was in the room  She acknowledged consent and understanding of privacy and security of the video platform  The patient has agreed to participate and understands they can discontinue the visit at any time  Subjective    Barb Cabrera is a 60 y o  female status post right breast conservation for a stage IA invasive ductal carcinoma  Presents today for a routine follow up after completing radiation to the right breast on 7/18/18  Last seen in our office on 8/22/19 2/13/20-Mammo diagnostic bilateral   IMPRESSION:   1  No mammographic evidence of malignancy  2  Bilateral diagnostic mammography in 1 year recommended  ASSESSMENT/BI-RADS CATEGORY:  Left: 2 - Benign  Right: 2 - Benign  Overall: 2 - Benign    4/20/20- Dr Troy Garcia  Clinically doing well  Continue anastrozole    8/17/20- Dr Fahad COLBERT   She would like to forego MRI secondary to high deductible  F/U in Feb 2021 with mammogram    2/15/21-Mammo  2/22/21- Dr Kamlesh Sinclairi  4/19/21- Dr Wells    Past Medical History:   Diagnosis Date    Anxiety     Basal cell carcinoma (BCC) 2019    nose    Bilateral breast cysts     Depression     Frequent urination     Hashimoto's disease     Hypothyroidism     Night sweats     Ovarian cyst     Palpitations     improved since lowered caffeine intake    Tinnitus     b/l    Viral syndrome        Past Surgical History:   Procedure Laterality Date    BREAST BIOPSY Right 03 01/18    DCIS    BREAST BIOPSY Left 04/2018    benign    BREAST LUMPECTOMY Right 4/24/2018    Procedure: LUMPECTOMY BREAST NEEDLE LOCALIZED BRACKET;  Surgeon: Wilder Parikh MD;  [facility];  Service: Surgical Oncology    COLONOSCOPY      ESOPHAGOGASTRODUODENOSCOPY      LYMPH NODE BIOPSY Right 4/24/2018    Procedure: BIOPSY LYMPH NODE SENTINEL;  Surgeon: Wilder Parikh MD;  [facility];  Service: Surgical Oncology    MAMMO NEEDLE LOCALIZATION RIGHT (ALL INC) Right 4/24/2018    MAMMO NEEDLE LOCALIZATION RIGHT (ALL INC) EACH ADD Right 4/24/2018    MAMMO STEREOTACTIC BREAST BIOPSY RIGHT (ALL INC) Right 3/1/2018    MRI BREAST BIOPSY LEFT (ALL INCLUSIVE) Left 4/16/2018    WISDOM TOOTH EXTRACTION         Current Outpatient Medications   Medication Sig Dispense Refill    anastrozole (ARIMIDEX) 1 mg tablet Take 1 tablet (1 mg total) by mouth daily 90 tablet 3    Calcium Carb-Cholecalciferol (CALCIUM+D3 PO) Take 1 tablet by mouth daily       levothyroxine 88 mcg tablet Take 1 tablet by mouth daily      Multiple Vitamin (MULTIVITAMIN) tablet Take 1 tablet by mouth daily      fluorouracil (EFUDEX) 5 % cream        No current facility-administered medications for this visit           Allergies   Allergen Reactions    Clindamycin Other (See Comments)     Was painful to walk     Erythromycin Hives    Sulfamethoxazole-Trimethoprim Hives    Amoxicillin Rash         I spent 30 minutes with the patient during this visit  Follow up visit       Oncology History   Malignant neoplasm of upper-outer quadrant of right breast in female, estrogen receptor positive ([facility] [address] )   3/1/2018 Initial Diagnosis    Malignant neoplasm of upper-outer quadrant of right breast in female, estrogen receptor positive ([facility] [address] )     3/1/2018 Biopsy    RIGHT breast biopsy:  ER/RI negative, HER2 by IHC 3+ positive  Microinvasive carcinoma arising in extensive high-grade ductal carcinoma in-situ (DCIS), grade        4/24/2018 Surgery    LUMPECTOMY BREAST NEEDLE LOCALIZED BRACKET (Right)  BIOPSY LYMPH NODE SENTINEL (Right) - Dr Emily Diaz     4/24/2018 -  Cancer Staged    Stage IA - pT1a, pN0(sn), cM0, G2, ER 90-95% positive, RI <1% negative, HER2 by IHC 2+ equivocal; FISH pending  4 mm IDC is largest focus-2 additional 0 8mm foci and extensive DCIS  No LVI  Extensive LCIS  Margins negative for invasive and in situ  Lymph node assessment/status 0/3        5/2018 -  Hormone Therapy    Anastrozole 1 mg daily    - Dr Medardo Osorio     6/19/2018 - 7/18/2018 Radiation    Right breast to 4256 cGy in 16 fractions followed by a boost to the lumpectomy cavity of an additional 1000 cGy         - Dr Susanna Tyson         Clinical Trial: no      Health Maintenance   Topic Date Due    Cervical Cancer Screening  07/21/1984    Influenza Vaccine  07/01/2020    Depression Screening PHQ  08/22/2020    HIV Screening  04/29/2021 (Originally 7/21/1980)    MAMMOGRAM  02/13/2021    Annual Physical  03/04/2021    BMI: Followup Plan  05/04/2021    BMI: Adult  09/10/2021    Colorectal Cancer Screening  11/11/2023    DTaP,Tdap,and Td Vaccines (2 - Td) 04/28/2030    Hepatitis C Screening  Completed    Pneumococcal Vaccine: Pediatrics (0 to 5 Years) and At-Risk Patients (6 to 59 Years)  Aged Out    HIB Vaccine  Aged Out    Hepatitis B Vaccine  Aged Out    IPV Vaccine  Aged Out    Hepatitis A Vaccine  Aged Kiran Hoang Meningococcal ACWY Vaccine  Aged Out    HPV Vaccine  Aged Out       Patient Active Problem List   Diagnosis    Malignant neoplasm of upper-outer quadrant of right breast in female, estrogen receptor positive ([address] )    Dense breast tissue    Use of anastrozole    Pelvic pain    Basal cell carcinoma (BCC) of skin of nose    Acquired hypothyroidism    HEIKE (obstructive sleep apnea)    Overweight     Past Medical History:   Diagnosis Date    Anxiety     Basal cell carcinoma (BCC) 2019    nose    Bilateral breast cysts     Depression     Frequent urination     Hashimoto's disease     Hypothyroidism     Night sweats     Ovarian cyst     Palpitations     improved since lowered caffeine intake    Tinnitus     b/l    Viral syndrome      Past Surgical History:   Procedure Laterality Date    BREAST BIOPSY Right 03 01/18    DCIS    BREAST BIOPSY Left 04/2018    benign    BREAST LUMPECTOMY Right 4/24/2018    Procedure: LUMPECTOMY BREAST NEEDLE LOCALIZED BRACKET;  Surgeon: Sara Butler MD;  [facility];  Service: Surgical Oncology    COLONOSCOPY      ESOPHAGOGASTRODUODENOSCOPY      LYMPH NODE BIOPSY Right 4/24/2018    Procedure: BIOPSY LYMPH NODE SENTINEL;  Surgeon: Sara Butler MD;  [facility];  Service: Surgical Oncology    MAMMO NEEDLE LOCALIZATION RIGHT (ALL INC) Right 4/24/2018    MAMMO NEEDLE LOCALIZATION RIGHT (ALL INC) EACH ADD Right 4/24/2018    MAMMO STEREOTACTIC BREAST BIOPSY RIGHT (ALL INC) Right 3/1/2018    MRI BREAST BIOPSY LEFT (ALL INCLUSIVE) Left 4/16/2018    WISDOM TOOTH EXTRACTION       Family History   Problem Relation Age of Onset    Prostate cancer Paternal Uncle 64   [facility] Anemia Mother     Kidney disease Mother     Dementia Father     Heart disease Father     Diabetes Father     Glaucoma Father     Hypothyroidism Father     No Known Problems Sister     No Known Problems Maternal Grandmother     No Known Problems Maternal Grandfather     No Known Problems Paternal Grandmother     No Known Problems Paternal Grandfather     No Known Problems Maternal Aunt     No Known Problems Paternal Aunt     No Known Problems Paternal Aunt     Hypothyroidism Brother      Social History     Socioeconomic History    Marital status: /Civil Union     Spouse name: Not on file    Number of children: Not on file    Years of education: Not on file    Highest education level: Not on file   Occupational History    Not on file   Social Needs    Financial resource strain: Not on file    Food insecurity     Worry: Not on file     Inability: Not on file   [employer] needs     Medical: Not on file     Non-medical: Not on file   Tobacco Use    Smoking status: Never Smoker    Smokeless tobacco: Never Used   Substance and Sexual Activity    Alcohol use:  Yes     Alcohol/week: 7 0 standard drinks     Types: 7 Standard drinks or equivalent per week     Comment: 7 -8 drinks a week    Drug use: No    Sexual activity: Not on file   Lifestyle    Physical activity     Days per week: Not on file     Minutes per session: Not on file    Stress: Not on file   Relationships    Social connections     Talks on phone: Not on file     Gets together: Not on file     Attends Quaker service: Not on file     Active member of club or organization: Not on file     Attends meetings of clubs or organizations: Not on file     Relationship status: Not on file    Intimate partner violence     Fear of current or ex partner: Not on file     Emotionally abused: Not on file     Physically abused: Not on file     Forced sexual activity: Not on file   Other Topics Concern    Not on file   Social History Narrative    Not on file       Current Outpatient Medications:     anastrozole (ARIMIDEX) 1 mg tablet, Take 1 tablet (1 mg total) by mouth daily, Disp: 90 tablet, Rfl: 3    Calcium Carb-Cholecalciferol (CALCIUM+D3 PO), Take 1 tablet by mouth daily , Disp: , Rfl:     levothyroxine 88 mcg tablet, Take 1 tablet by mouth daily, Disp: , Rfl:     Multiple Vitamin (MULTIVITAMIN) tablet, Take 1 tablet by mouth daily, Disp: , Rfl:     fluorouracil (EFUDEX) 5 % cream, , Disp: , Rfl:   Allergies   Allergen Reactions    Clindamycin Other (See Comments)     Was painful to walk     Erythromycin Hives    Sulfamethoxazole-Trimethoprim Hives    Amoxicillin Rash       Review of Systems:  Review of Systems   Constitutional: Negative  HENT: Negative  Eyes: Negative  Respiratory: Negative  Cardiovascular: Positive for palpitations (at times)  Gastrointestinal: Negative  Endocrine: Negative  Genitourinary: Negative  Musculoskeletal: Negative  Reports pain in soles of feet, reports knees crack which is new   Skin: Negative  Allergic/Immunologic: Negative  Neurological: Negative  Hematological: Negative  Psychiatric/Behavioral: Negative  Vitals:    09/10/20 1500   Weight: 78 5 kg (173 lb)   Height: 5' 6" (1 676 m)        Pain Score: 0-No pain      Imaging:No results found

## 2020-09-10 NOTE — PROGRESS NOTES
Follow-up - Radiation Oncology   Jasmin Chow 1965 60 y o  female [id number]      History of Present Illness   Cancer Staging  Malignant neoplasm of upper-outer quadrant of right breast in female, estrogen receptor positive ([facility] [address] )  Staging form: Breast, AJCC 8th Edition  - Clinical: cT1mi, cN0, cM0 - Signed by Jenae Campoverde MD on 3/14/2018  Laterality: Right  - Pathologic: Stage IA (pT1a, pN0(sn), cM0, G2, ER: Positive, CA: Negative, HER2: Equivocal) - Signed by eJnae Campoverde MD on 5/8/2018  Laterality: Right  Method of lymph node assessment: [place] lymph node biopsy  Histologic grading system: 3 grade system      Jasmin Chow is a 60y o  year old female with a history of right breast carcinoma  Reason for visit is radiation oncology follow-up     Encounter provider Bairon Nava MD     Provider located at [address] [place] [id number]     Recent Visits  No visits were found meeting these conditions  Showing recent visits within past 7 days and meeting all other requirements      Today's Visits  Date Type Provider Dept   09/10/20 Telemedicine Bairon Nava MD [facility]   Showing today's visits and meeting all other requirements      Future Appointments  No visits were found meeting these conditions  Showing future appointments within next 150 days and meeting all other requirements         After connecting through [employer], the patient was identified by name and date of birth  Jasmin Chow was informed that this is a telemedicine visit and that the visit is being conducted through [employer] which may not be secure and therefore, might not be HIPAA-compliant  My office door was closed  No one else was in the room  She acknowledged consent and understanding of privacy and security of the video platform   The patient has agreed to participate and understands they can discontinue the visit at any time      Subjective     Gerardo Linda is a 60 y o  female status post right breast conservation for a stage IA invasive ductal carcinoma  Presents today for a routine follow up after completing radiation to the right breast on 7/18/18  Last seen in our office on 8/22/19       Interval History:  2/13/20-Mammo diagnostic bilateral   IMPRESSION:   1  No mammographic evidence of malignancy  2  Bilateral diagnostic mammography in 1 year recommended     ASSESSMENT/BI-RADS CATEGORY:  Left: 2 - Benign  Right: 2 - Benign  Overall: 2 - Benign     4/20/20- Dr Ross Bernheim  Clinically doing well  Continue anastrozole     8/17/20- Dr Sara COLBERT  She would like to forego MRI secondary to high deductible  F/U in Feb 2021 with mammogram     She reports she is feeling well  She denies any breast masses bilaterally  She also denies any breast discharge  She retired in May 2020  Since then, she has had some foot pain in her soles and thinks she may have plantar fasciitis  She has no gyn complaints and denies any vaginal bleeding or discharge  She has not had a gyn examination nor PAP smear for many years  She is taking anastrozole daily and denies any hot flashes        2/15/21-Mammogram  2/22/21- Dr Moe Gonzales  4/19/21- Dr Barnhart [place]   Oncology History   Malignant neoplasm of upper-outer quadrant of right breast in female, estrogen receptor positive ([facility] [address] )   3/1/2018 Initial Diagnosis    Malignant neoplasm of upper-outer quadrant of right breast in female, estrogen receptor positive ([facility] [address] )     3/1/2018 Biopsy    RIGHT breast biopsy:  ER/MT negative, HER2 by IHC 3+ positive  Microinvasive carcinoma arising in extensive high-grade ductal carcinoma in-situ (DCIS), grade        4/24/2018 Surgery    LUMPECTOMY BREAST NEEDLE LOCALIZED BRACKET (Right)  BIOPSY LYMPH NODE SENTINEL (Right) - Dr Moe Gonzales     4/24/2018 -  Cancer Staged    Stage IA - pT1a, pN0(sn), cM0, G2, ER 90-95% positive, MT <1% negative, HER2 by IHC 2+ equivocal; FISH pending  4 mm IDC is largest focus-2 additional 0 8mm foci and extensive DCIS  No LVI  Extensive LCIS  Margins negative for invasive and in situ  Lymph node assessment/status 0/3        5/2018 -  Hormone Therapy    Anastrozole 1 mg daily    - Dr Rhoda Henriquez     6/19/2018 - 7/18/2018 Radiation    Right breast to 4256 cGy in 16 fractions followed by a boost to the lumpectomy cavity of an additional 1000 cGy         - Dr Kelly Rabago         Past Medical History:   Diagnosis Date    Anxiety     Basal cell carcinoma (BCC) 2019    nose    Bilateral breast cysts     Depression     Frequent urination     Hashimoto's disease     Hypothyroidism     Night sweats     Ovarian cyst     Palpitations     improved since lowered caffeine intake    Tinnitus     b/l    Viral syndrome      Past Surgical History:   Procedure Laterality Date    BREAST BIOPSY Right 03 01/18    DCIS    BREAST BIOPSY Left 04/2018    benign    BREAST LUMPECTOMY Right 4/24/2018    Procedure: LUMPECTOMY BREAST NEEDLE LOCALIZED BRACKET;  Surgeon: Malcolm Oliveira MD;  [facility];  Service: Surgical Oncology    COLONOSCOPY      ESOPHAGOGASTRODUODENOSCOPY      LYMPH NODE BIOPSY Right 4/24/2018    Procedure: BIOPSY LYMPH NODE SENTINEL;  Surgeon: Malcolm Oliveira MD;  [facility];  Service: Surgical Oncology    MAMMO NEEDLE LOCALIZATION RIGHT (ALL INC) Right 4/24/2018    MAMMO NEEDLE LOCALIZATION RIGHT (ALL INC) EACH ADD Right 4/24/2018    MAMMO STEREOTACTIC BREAST BIOPSY RIGHT (ALL INC) Right 3/1/2018    MRI BREAST BIOPSY LEFT (ALL INCLUSIVE) Left 4/16/2018    WISDOM TOOTH EXTRACTION         Social History   Social History     Substance and Sexual Activity   Alcohol Use Yes    Alcohol/week: 7 0 standard drinks    Types: 7 Standard drinks or equivalent per week    Comment: 7 -8 drinks a week     Social History     Substance and Sexual Activity   Drug Use No     Social History     Tobacco Use   Smoking Status Never Smoker   Smokeless Tobacco Never Used     Meds/Allergies     Current Outpatient Medications:     anastrozole (ARIMIDEX) 1 mg tablet, Take 1 tablet (1 mg total) by mouth daily, Disp: 90 tablet, Rfl: 3    Calcium Carb-Cholecalciferol (CALCIUM+D3 PO), Take 1 tablet by mouth daily , Disp: , Rfl:     levothyroxine 88 mcg tablet, Take 1 tablet by mouth daily, Disp: , Rfl:     Multiple Vitamin (MULTIVITAMIN) tablet, Take 1 tablet by mouth daily, Disp: , Rfl:     fluorouracil (EFUDEX) 5 % cream, , Disp: , Rfl:   Allergies   Allergen Reactions    Clindamycin Other (See Comments)     Was painful to walk     Erythromycin Hives    Sulfamethoxazole-Trimethoprim Hives    Amoxicillin Rash     Review of Systems   Constitutional: Negative  HENT: Negative  Eyes: Negative  Respiratory: Negative  Cardiovascular: Positive for palpitations (at times)  Gastrointestinal: Negative  Endocrine: Negative  Genitourinary: Negative  Musculoskeletal: Negative  Reports pain in soles of feet, reports knees crack which is new   Skin: Negative  Allergic/Immunologic: Negative  Neurological: Negative  Hematological: Negative  Psychiatric/Behavioral: Negative         OBJECTIVE:   Ht 5' 6" (1 676 m)   Wt 78 5 kg (173 lb)   LMP  (LMP Unknown)   BMI 27 92 kg/m²   Pain Assessment:  0  ECOG/Zubrod/WHO: 1 - Symptomatic but completely ambulatory    Physical Exam virtual physical examination:  Constitutional:  Well-developed in no acute distress  Eyes:  No scleral icterus  Neck:  No lymphadenopathy  Pulmonary/chest:  Respiratory effort normal with no respiratory distress  Speaking in full sentences  Neurological:  Alert orient x3 with no facial asymmetry  Skin:  No rash, no pallor and not diaphoretic  Psychiatric:  Normal mood and affect with normal behavior  RESULTS    Lab Results: No results found for this or any previous visit (from the past 672 hour(s))      Imaging Studies:No results found         Assessment/Plan:  No orders of the defined types were placed in this encounter  Jami Pavon is a 60y o  year old female with a history of stage IA invasive right breast carcinoma status post lumpectomy followed by adjuvant breast radiation that was completed on July 18, 2018   She returns today for follow-up visit  Son Fuller is doing well and has no clinical nor mammographic evidence of any recurrent disease   She is on anastrozole and is tolerating this relatively well    She was seen by Dr Jarod Bonilla 17, 2020 for follow-up and will return in 6 months    She had a bilateral breast MRI October 3, 2019 that was negative  She had bilateral diagnostic mammogram February 13, 2020 that was also negative  She will have a repeat bilateral mammogram February 15, 2021 and then see Dr Libra Hawk thereafter  We asked her to return here for follow-up in 12 months  Gali Alexander MD  9/10/2020,3:20 PM    Portions of the record may have been created with voice recognition software   Occasional wrong word or "sound a like" substitutions may have occurred due to the inherent limitations of voice recognition software   Read the chart carefully and recognize, using context, where substitutions have occurred

## 2021-01-21 ENCOUNTER — TELEPHONE (OUTPATIENT)
Dept: HEMATOLOGY ONCOLOGY | Facility: CLINIC | Age: 56
End: 2021-01-21

## 2021-01-21 NOTE — TELEPHONE ENCOUNTER
Patient called, will be receiving COVID vaccine at the beginning of February Ok with Dr Nolan Dominguez from a heme/onc standpoint  Patient aware

## 2021-02-15 ENCOUNTER — HOSPITAL ENCOUNTER (OUTPATIENT)
Dept: MAMMOGRAPHY | Facility: CLINIC | Age: 56
Discharge: HOME | End: 2021-02-15
Payer: COMMERCIAL

## 2021-02-15 VITALS — HEIGHT: 66 IN | WEIGHT: 173 LBS | BODY MASS INDEX: 27.8 KG/M2

## 2021-02-15 DIAGNOSIS — Z17.0 MALIGNANT NEOPLASM OF UPPER-OUTER QUADRANT OF RIGHT BREAST IN FEMALE, ESTROGEN RECEPTOR POSITIVE (HCC): ICD-10-CM

## 2021-02-15 DIAGNOSIS — C50.411 MALIGNANT NEOPLASM OF UPPER-OUTER QUADRANT OF RIGHT BREAST IN FEMALE, ESTROGEN RECEPTOR POSITIVE (HCC): ICD-10-CM

## 2021-02-15 PROCEDURE — 77066 DX MAMMO INCL CAD BI: CPT

## 2021-02-15 PROCEDURE — G0279 TOMOSYNTHESIS, MAMMO: HCPCS

## 2021-03-10 DIAGNOSIS — Z23 ENCOUNTER FOR IMMUNIZATION: ICD-10-CM

## 2021-04-19 ENCOUNTER — OFFICE VISIT (OUTPATIENT)
Dept: SURGICAL ONCOLOGY | Facility: CLINIC | Age: 56
End: 2021-04-19
Payer: COMMERCIAL

## 2021-04-19 ENCOUNTER — OFFICE VISIT (OUTPATIENT)
Dept: HEMATOLOGY ONCOLOGY | Facility: CLINIC | Age: 56
End: 2021-04-19
Payer: COMMERCIAL

## 2021-04-19 VITALS
BODY MASS INDEX: 28.93 KG/M2 | DIASTOLIC BLOOD PRESSURE: 72 MMHG | OXYGEN SATURATION: 95 % | SYSTOLIC BLOOD PRESSURE: 112 MMHG | TEMPERATURE: 99.1 F | HEART RATE: 85 BPM | HEIGHT: 66 IN | WEIGHT: 180 LBS | RESPIRATION RATE: 18 BRPM

## 2021-04-19 VITALS
WEIGHT: 180 LBS | TEMPERATURE: 98.9 F | DIASTOLIC BLOOD PRESSURE: 70 MMHG | HEART RATE: 85 BPM | HEIGHT: 66 IN | SYSTOLIC BLOOD PRESSURE: 100 MMHG | BODY MASS INDEX: 28.93 KG/M2

## 2021-04-19 DIAGNOSIS — C50.411 MALIGNANT NEOPLASM OF UPPER-OUTER QUADRANT OF RIGHT BREAST IN FEMALE, ESTROGEN RECEPTOR POSITIVE (HCC): Primary | ICD-10-CM

## 2021-04-19 DIAGNOSIS — C50.411 MALIGNANT NEOPLASM OF UPPER-OUTER QUADRANT OF RIGHT BREAST IN FEMALE, ESTROGEN RECEPTOR POSITIVE (HCC): ICD-10-CM

## 2021-04-19 DIAGNOSIS — Z17.0 MALIGNANT NEOPLASM OF UPPER-OUTER QUADRANT OF RIGHT BREAST IN FEMALE, ESTROGEN RECEPTOR POSITIVE (HCC): ICD-10-CM

## 2021-04-19 DIAGNOSIS — Z17.0 MALIGNANT NEOPLASM OF UPPER-OUTER QUADRANT OF RIGHT BREAST IN FEMALE, ESTROGEN RECEPTOR POSITIVE (HCC): Primary | ICD-10-CM

## 2021-04-19 DIAGNOSIS — Z79.811 USE OF ANASTROZOLE: ICD-10-CM

## 2021-04-19 DIAGNOSIS — R92.2 DENSE BREAST TISSUE: ICD-10-CM

## 2021-04-19 PROCEDURE — 99214 OFFICE O/P EST MOD 30 MIN: CPT | Performed by: INTERNAL MEDICINE

## 2021-04-19 PROCEDURE — 99214 OFFICE O/P EST MOD 30 MIN: CPT | Performed by: SURGERY

## 2021-04-19 PROCEDURE — 3008F BODY MASS INDEX DOCD: CPT | Performed by: INTERNAL MEDICINE

## 2021-04-19 PROCEDURE — 1036F TOBACCO NON-USER: CPT | Performed by: INTERNAL MEDICINE

## 2021-04-19 RX ORDER — ANASTROZOLE 1 MG/1
1 TABLET ORAL DAILY
Qty: 90 TABLET | Refills: 3 | Status: SHIPPED | OUTPATIENT
Start: 2021-04-19 | End: 2022-04-25 | Stop reason: SDUPTHER

## 2021-04-19 NOTE — PROGRESS NOTES
Surgical Oncology Follow Up       [facility]  [facility] [address]    Jacob Jackson  1965  [id number]  [facility] SURGICAL ONCOLOGY [place]  [place] [id number]    Chief Complaint   Patient presents with    Follow-up       Assessment/Plan   Diagnoses and all orders for this visit:    Malignant neoplasm of upper-outer quadrant of right breast in female, estrogen receptor positive ([facility] [address] )    Dense breast tissue    Use of anastrozole        Advance Care Planning/Advance Directives:  Discussed disease status, cancer treatment plans and/or cancer treatment goals with the patient  Oncology History:    Oncology History   Malignant neoplasm of upper-outer quadrant of right breast in female, estrogen receptor positive ([facility] [address] )   3/1/2018 Initial Diagnosis    Malignant neoplasm of upper-outer quadrant of right breast in female, estrogen receptor positive ([facility] [address] )     3/1/2018 Biopsy    RIGHT breast biopsy:  ER/GA negative, HER2 by IHC 3+ positive  Microinvasive carcinoma arising in extensive high-grade ductal carcinoma in-situ (DCIS), grade        4/24/2018 Surgery    LUMPECTOMY BREAST NEEDLE LOCALIZED BRACKET (Right)  BIOPSY LYMPH NODE SENTINEL (Right) - Dr Elenita Nava     4/24/2018 -  Cancer Staged    Stage IA - pT1a, pN0(sn), cM0, G2, ER 90-95% positive, GA <1% negative, HER2 by IHC 2+ equivocal; FISH pending  4 mm IDC is largest focus-2 additional 0 8mm foci and extensive DCIS  No LVI  Extensive LCIS  Margins negative for invasive and in situ  Lymph node assessment/status 0/3        5/2018 -  Hormone Therapy    Anastrozole 1 mg daily    - Dr Danita Dai     6/19/2018 - 7/18/2018 Radiation    Right breast to 4256 cGy in 16 fractions followed by a boost to the lumpectomy cavity of an additional 1000 cGy         - Dr Suzanne Neal         History of Present Illness:  Breast cancer follow-up, continues on anastrozole with no concerns  -Interval History:  Recent mammogram    Review of Systems:  Review of Systems   Constitutional: Negative  Negative for appetite change and fever  Eyes: Negative  Respiratory: Negative for shortness of breath  Cardiovascular: Negative  Gastrointestinal: Negative  Endocrine: Negative  Genitourinary: Negative  Musculoskeletal: Negative  Negative for arthralgias and myalgias  Skin: Negative  Allergic/Immunologic: Negative  Neurological: Negative  Hematological: Negative  Negative for adenopathy  Does not bruise/bleed easily  Psychiatric/Behavioral: Negative          Patient Active Problem List   Diagnosis    Malignant neoplasm of upper-outer quadrant of right breast in female, estrogen receptor positive ([address] )    Dense breast tissue    Use of anastrozole    Pelvic pain    Basal cell carcinoma (BCC) of skin of nose    Acquired hypothyroidism    HEIKE (obstructive sleep apnea)    Overweight     Past Medical History:   Diagnosis Date    Anxiety     Basal cell carcinoma (BCC) 2019    nose    Bilateral breast cysts     Depression     Frequent urination     Hashimoto's disease     Hypothyroidism     Night sweats     Ovarian cyst     Palpitations     improved since lowered caffeine intake    Tinnitus     b/l    Viral syndrome      Past Surgical History:   Procedure Laterality Date    BREAST BIOPSY Right 03 01/18    DCIS    BREAST BIOPSY Left 04/2018    benign    BREAST LUMPECTOMY Right 4/24/2018    Procedure: LUMPECTOMY BREAST NEEDLE LOCALIZED BRACKET;  Surgeon: Carmen Saenz MD;  [facility];  Service: Surgical Oncology    COLONOSCOPY      ESOPHAGOGASTRODUODENOSCOPY      LYMPH NODE BIOPSY Right 4/24/2018    Procedure: BIOPSY LYMPH NODE SENTINEL;  Surgeon: Carmen Saenz MD;  [facility];  Service: Surgical Oncology    MAMMO NEEDLE LOCALIZATION RIGHT (ALL INC) Right 4/24/2018    MAMMO NEEDLE LOCALIZATION RIGHT (ALL INC) EACH ADD Right 4/24/2018    MAMMO STEREOTACTIC BREAST BIOPSY RIGHT (ALL INC) Right 3/1/2018    MRI BREAST BIOPSY LEFT (ALL INCLUSIVE) Left 4/16/2018    WISDOM TOOTH EXTRACTION       Family History   Problem Relation Age of Onset    Prostate cancer Paternal Uncle 64   [place] Anemia Mother     Kidney disease Mother     Dementia Father     Heart disease Father     Diabetes Father     Glaucoma Father     Hypothyroidism Father     No Known Problems Sister     No Known Problems Maternal Grandmother     No Known Problems Maternal Grandfather     No Known Problems Paternal Grandmother     No Known Problems Paternal Grandfather     No Known Problems Maternal Aunt     No Known Problems Paternal Aunt     No Known Problems Paternal Aunt     Hypothyroidism Brother      Social History     Socioeconomic History    Marital status: /Civil Union     Spouse name: Not on file    Number of children: Not on file    Years of education: Not on file    Highest education level: Not on file   Occupational History    Not on file   Social Needs    Financial resource strain: Not on file    Food insecurity     Worry: Not on file     Inability: Not on file    Transportation needs     Medical: Not on file     Non-medical: Not on file   Tobacco Use    Smoking status: Never Smoker    Smokeless tobacco: Never Used   Substance and Sexual Activity    Alcohol use:  Yes     Alcohol/week: 7 0 standard drinks     Types: 7 Standard drinks or equivalent per week     Comment: 7 -8 drinks a week    Drug use: No    Sexual activity: Not on file   Lifestyle    Physical activity     Days per week: Not on file     Minutes per session: Not on file    Stress: Not on file   Relationships    Social connections     Talks on phone: Not on file     Gets together: Not on file     Attends [employer] service: Not on file     Active member of club or organization: Not on file     Attends meetings of clubs or organizations: Not on file Relationship status: Not on file    Intimate partner violence     Fear of current or ex partner: Not on file     Emotionally abused: Not on file     Physically abused: Not on file     Forced sexual activity: Not on file   Other Topics Concern    Not on file   Social History Narrative    Not on file       Current Outpatient Medications:     anastrozole (ARIMIDEX) 1 mg tablet, Take 1 tablet (1 mg total) by mouth daily, Disp: 90 tablet, Rfl: 3    Calcium Carb-Cholecalciferol (CALCIUM+D3 PO), Take 1 tablet by mouth daily , Disp: , Rfl:     levothyroxine 88 mcg tablet, Take 1 tablet by mouth daily, Disp: , Rfl:     Multiple Vitamin (MULTIVITAMIN) tablet, Take 1 tablet by mouth daily, Disp: , Rfl:   Allergies   Allergen Reactions    Clindamycin Other (See Comments)     Was painful to walk     Erythromycin Hives    Sulfamethoxazole-Trimethoprim Hives    Amoxicillin Rash       The following portions of the patient's history were reviewed and updated as appropriate: allergies, current medications, past family history, past medical history, past social history, past surgical history and problem list         Vitals:    04/19/21 1608   BP: 100/70   Pulse: 85   Temp: 98 9 °F (37 2 °C)       Physical Exam  Constitutional:       General: She is not in acute distress  Appearance: She is well-developed  HENT:      Head: Normocephalic and atraumatic  Cardiovascular:      Heart sounds: Normal heart sounds  Pulmonary:      Breath sounds: Normal breath sounds  Chest:      Breasts:         Right: Skin change (  Lumpectomy scar) present  No inverted nipple, mass, nipple discharge or tenderness  Left: No inverted nipple, mass, nipple discharge, skin change or tenderness  Abdominal:      Palpations: Abdomen is soft  Lymphadenopathy:      Upper Body:      Right upper body: No supraclavicular, axillary or pectoral adenopathy  Left upper body: No supraclavicular, axillary or pectoral adenopathy  Neurological:      Mental Status: She is alert and oriented to person, place, and time  Psychiatric:         Mood and Affect: Mood normal            Results:  Labs:      Imaging   02/15/2021 bilateral 3D  Diagnostic mammogram was benign BI-RADS two with a density of three    I reviewed the above imaging data  Discussion/Summary: 73-year-old female status post right breast conservation for a stage IA invasive ductal carcinoma  She did have radiation therapy and continues on anastrozole  There is no evidence of disease based on examination today  Her recent mammogram was benign  She continues to have dense breast tissue  I will plan to see her again in six months or sooner should the need arise

## 2021-04-19 NOTE — PROGRESS NOTES
Hematology / Oncology Outpatient Follow Up Note    Pato George 60 y o  female :1965 UZY:[id number]         Date:  2021    Assessment / Plan:  A 24-year-old postmenopausal woman with stage IA right breast cancer, grade 2, ER 90% positive, VA negative, HER2 equivocal disease   Interestingly enough, initial biopsy which showed micro invasion with ER negative, VA negative HER2 3+ disease   She underwent lumpectomy with sentinel lymph node biopsy, resulting in FILEMON   Her tumor size was 4 mm    She is currently on adjuvant hormonal therapy with anastrozole with no significant toxicity  She has no evidence recurrent disease, based on her symptoms and physical examinations  I recommended her to continue with anastrozole 1 mg once a day for 2 more years to complete 5 years of adjuvant hormonal therapy  She is in agreement with my recommendation  I will see her again in a year for routine follow-up              Subjective:      HPI:  A 24-year-old postmenopausal woman who had last menstrual cycle at age of 44 was found to have mammographic abnormality in her right breast   Therefore, she underwent right breast biopsy in 2018 which showed micro invasive carcinoma with extensive DCIS   Micro invasive carcinoma was ER negative, VA negative, HER2 3+ disease   Subsequently, she underwent lumpectomy with sentinel lymph node biopsy by Dr Fabiano Park in 2018   She had 4 x 3 mm of invasive ductal carcinoma, grade 2   Lymphovascular invasion was not seen   One sentinel lymph node was negative for metastatic disease   This was ER 90% positive, VA negative, HER2 2+ disease    HER2 fish was equivocal with ratio of 1 4   She presents today to discuss adjuvant treatment options   She has no complaint of pain   However, since last week, she has noticed some erythematous skin change at the lumpectomy site  Roberto Delgado is no fever, chills or night sweats   She denied any respiratory symptoms   She only has hypothyroidism as past medical history   She has no history of major surgery   She denied family history of breast or ovarian cancer   Her performance status is normal            Interval History:  A 64-year-old postmenopausal woman with stage IA right breast cancer, grade 2, ER 90% positive, TX negative, HER2 equivocal disease   Interestingly enough, initial biopsy which showed micro invasion was ER negative, TX negative HER2 3+ disease   She underwent lumpectomy with sentinel lymph node biopsy, resulting in FILEMON   Her tumor size was 4 mm    We had extensive discussion regarding trastuzumab, possible adjuvant chemotherapy as well as anastrozole monotherapy   She chose to be on anastrozole monotherapy, which she has been on since May 2018  She presents today for routine follow-up  She feels well with no new complaint  She denied hot flashes or musculoskeletal symptoms  Her weight is stable  She denied bone pain  She has no respiratory symptoms    Her performance status is normal       Objective:      Primary Diagnosis:     1   Right breast cancer, stage IA  (pT1a, pN0, M0) grade 2, ER 90% positive, TX negative, HER2 fish equivocal  (initial biopsy report micro invasive cancer, ER negative, TX negative, HER2 3+ disease )  Diagnosed in April 2018      Cancer Staging:  Cancer Staging   Malignant neoplasm of upper-outer quadrant of right breast in female, estrogen receptor positive ([facility] [address] )  Staging form: Breast, AJCC 8th Edition  - Clinical: cT1mi, cN0, cM0 - Signed by Sara Butler MD on 3/14/2018  - Pathologic: Stage IA (pT1a, pN0(sn), cM0, G2, ER: Positive, TX: Negative, HER2: Equivocal) - Signed by Sara Butler MD on 5/8/2018           Previous Hematologic/ Oncologic Treatment:            Current Hematologic/ Oncologic Treatment:       Adjuvant hormonal therapy with anastrozole since June 2018      Disease Status:      FILEMON status post lumpectomy with sentinel lymph node biopsy      Test Results:     Pathology:     Initial biopsy showed micro invasive cancer with extensive DCIS   Micro invasive part was ER negative, IA negative, her 2 3+ disease        Lumpectomy showed 4 x 3 mm of invasive ductal carcinoma, grade 2   No evidence of lymphovascular invasion   1 sentinel lymph node was negative for metastatic disease   This was ER 90 -95 % positive, IA negative, HER2  2+ disease  Sandi Crest fish was equivocal with ratio of 1 4   Stage IA (pT1a, pN0, M0)     Radiology:     Mammography in February 2021 was benign   BI-RADS 2      Laboratory:     CBC and CMP are within normal limits  Physical Exam:      General Appearance:    Alert, oriented        Eyes:    PERRL   Ears:    Normal external ear canals, both ears   Nose:   Nares normal, septum midline   Throat:   Mucosa moist  Pharynx without injection  Neck:   Supple       Lungs:     Clear to auscultation bilaterally   Chest Wall:    No tenderness or deformity    Heart:    Regular rate and rhythm       Abdomen:     Soft, non-tender, bowel sounds +, no organomegaly           Extremities:   Extremities no cyanosis or edema       Skin:   no rash or icterus  Lymph nodes:   Cervical, supraclavicular, and axillary nodes normal   Neurologic:   CNII-XII intact, normal strength, sensation and reflexes     Throughout          Breast exam:   Lumpectomy scar at lateral aspect of her right breast with minor erythematous skin change   No palpable abnormality   Left breast exam is negative  ROS: Review of Systems   All other systems reviewed and are negative  Imaging: No results found        Labs:   Lab Results   Component Value Date    WBC 6 1 03/16/2020    HGB 13 4 03/16/2020    HCT 40 0 03/16/2020    MCV 88 1 03/16/2020     03/16/2020     Lab Results   Component Value Date    K 4 0 03/16/2020     03/16/2020    CO2 30 03/16/2020    BUN 18 03/16/2020    CREATININE 0 75 03/16/2020    GLUF 93 10/25/2019    CALCIUM 9 4 03/16/2020 AST 29 03/16/2020    ALT 34 (H) 03/16/2020    ALKPHOS 84 03/16/2020    EGFR 89 04/12/2018         Current Medications: Reviewed  Allergies: Reviewed  PMH/FH/SH:  Reviewed      Vital Sign:    Body surface area is 1 91 meters squared      Wt Readings from Last 3 Encounters:   04/19/21 81 6 kg (180 lb)   02/15/21 78 5 kg (173 lb)   09/10/20 78 5 kg (173 lb)        Temp Readings from Last 3 Encounters:   04/19/21 99 1 °F (37 3 °C) (Tympanic Core)   08/17/20 98 6 °F (37 °C) (Tympanic)   03/04/20 98 4 °F (36 9 °C) (Tympanic)        BP Readings from Last 3 Encounters:   04/19/21 112/72   08/17/20 104/70   05/11/20 100/69         Pulse Readings from Last 3 Encounters:   04/19/21 85   08/17/20 75   05/11/20 68     @LASTSAO2(3)@

## 2021-05-06 ENCOUNTER — OFFICE VISIT (OUTPATIENT)
Dept: SLEEP CENTER | Facility: CLINIC | Age: 56
End: 2021-05-06
Payer: COMMERCIAL

## 2021-05-06 VITALS
BODY MASS INDEX: 28.93 KG/M2 | HEIGHT: 66 IN | DIASTOLIC BLOOD PRESSURE: 70 MMHG | WEIGHT: 180 LBS | HEART RATE: 70 BPM | SYSTOLIC BLOOD PRESSURE: 100 MMHG

## 2021-05-06 DIAGNOSIS — G47.33 OSA (OBSTRUCTIVE SLEEP APNEA): Primary | ICD-10-CM

## 2021-05-06 PROCEDURE — 99213 OFFICE O/P EST LOW 20 MIN: CPT | Performed by: INTERNAL MEDICINE

## 2021-05-06 PROCEDURE — 1036F TOBACCO NON-USER: CPT | Performed by: INTERNAL MEDICINE

## 2021-05-06 PROCEDURE — 3008F BODY MASS INDEX DOCD: CPT | Performed by: INTERNAL MEDICINE

## 2021-05-06 NOTE — PROGRESS NOTES
Progress Note - Sleep Center   [place] :1965 MRN: [id number]      Reason for Visit:    60 y  o female  With moderate HEIKE    Assessment:   after one year of considering her options, she returns to start APAP      Plan:    APAP 4 to 20 cm    Follow up:   compliance check    History of Present Illness:   moderate HEIKE with GALE = 17 3      Review of Systems      Genitourinary need to urinate more than twice a night and post menopausal (no peroids)   Cardiology palpitations/fluttering feeling in the chest   Gastrointestinal abdominal pain or cramping that disturb sleep    Neurology none   Constitutional none   Integumentary none   Psychiatry none   Musculoskeletal none   Pulmonary shortness of breath with activity and snoring   ENT ringing in ears   Endocrine frequent urination   Hematological none         I have reviewed and updated the review of systems as necessary     Historical Information    Past Medical History:   Diagnosis Date    Anxiety     Basal cell carcinoma (BCC) 2019    nose    Bilateral breast cysts     Depression     Frequent urination     Hashimoto's disease     Hypothyroidism     Night sweats     Ovarian cyst     Palpitations     improved since lowered caffeine intake    Tinnitus     b/l    Viral syndrome          Past Surgical History:   Procedure Laterality Date    BREAST BIOPSY Right     DCIS    BREAST BIOPSY Left 2018    benign    BREAST LUMPECTOMY Right 2018    Procedure: LUMPECTOMY BREAST NEEDLE LOCALIZED BRACKET;  Surgeon: Veto Eisenmenger, MD;  [facility];  Service: Surgical Oncology    COLONOSCOPY      ESOPHAGOGASTRODUODENOSCOPY      LYMPH NODE BIOPSY Right 2018    Procedure: BIOPSY LYMPH NODE SENTINEL;  Surgeon: Veto Eisenmenger, MD;  [facility];  Service: Surgical Oncology    MAMMO NEEDLE LOCALIZATION RIGHT (ALL INC) Right 2018    MAMMO NEEDLE LOCALIZATION RIGHT (ALL INC) EACH ADD Right 2018    MAMMO STEREOTACTIC BREAST BIOPSY RIGHT (ALL INC) Right 3/1/2018    MRI BREAST BIOPSY LEFT (ALL INCLUSIVE) Left 4/16/2018    WISDOM TOOTH EXTRACTION           Social History     Socioeconomic History    Marital status: /Civil Union     Spouse name: None    Number of children: None    Years of education: None    Highest education level: None   Occupational History    None   Social Needs    Financial resource strain: None    Food insecurity     Worry: None     Inability: None    Transportation needs     Medical: None     Non-medical: None   Tobacco Use    Smoking status: Never Smoker    Smokeless tobacco: Never Used   Substance and Sexual Activity    Alcohol use:  Yes     Alcohol/week: 7 0 standard drinks     Types: 7 Standard drinks or equivalent per week     Comment: 7 -8 drinks a week    Drug use: No    Sexual activity: None   Lifestyle    Physical activity     Days per week: None     Minutes per session: None    Stress: None   Relationships    Social connections     Talks on phone: None     Gets together: None     Attends Buddhism service: None     Active member of club or organization: None     Attends meetings of clubs or organizations: None     Relationship status: None    Intimate partner violence     Fear of current or ex partner: None     Emotionally abused: None     Physically abused: None     Forced sexual activity: None   Other Topics Concern    None   Social History Narrative    None           History   Alcohol use: Not on file       History   Smoking Status    Not on file   Smokeless Tobacco    Not on file       Family History:   Family History   Problem Relation Age of Onset    Prostate cancer Paternal Uncle 64    Anemia Mother     Kidney disease Mother     Dementia Father     Heart disease Father     Diabetes Father     Glaucoma Father     Hypothyroidism Father     No Known Problems Sister     No Known Problems Maternal Grandmother     No Known Problems Maternal Grandfather     No Known Problems Paternal Grandmother     No Known Problems Paternal Grandfather     No Known Problems Maternal Aunt     No Known Problems Paternal Aunt     No Known Problems Paternal Aunt     Hypothyroidism Brother        Medications/Allergies:      Current Outpatient Medications:     anastrozole (ARIMIDEX) 1 mg tablet, Take 1 tablet (1 mg total) by mouth daily, Disp: 90 tablet, Rfl: 3    Calcium Carb-Cholecalciferol (CALCIUM+D3 PO), Take 1 tablet by mouth daily , Disp: , Rfl:     levothyroxine 88 mcg tablet, Take 1 tablet by mouth daily, Disp: , Rfl:     Multiple Vitamin (MULTIVITAMIN) tablet, Take 1 tablet by mouth daily, Disp: , Rfl:       Objective    Vital Signs:   Vitals:    05/06/21 1400   BP: 100/70   Pulse: 70   Weight: 81 6 kg (180 lb)   Height: 5' 6" (1 676 m)     [place] Sleepiness Scale: Total score: 4    Physical Exam:    General: Alert, appropriate, cooperative, overweight    Head: NC/AT    Skin: Warm, dry    Neuro: No motor abnormalities, cranial nerves appear intact    Psych: Normal affect            SUDHAKAR Smith    Board Certified Sleep Specialist

## 2021-05-12 ENCOUNTER — TELEPHONE (OUTPATIENT)
Dept: SLEEP CENTER | Facility: CLINIC | Age: 56
End: 2021-05-12

## 2021-08-03 ENCOUNTER — OFFICE VISIT (OUTPATIENT)
Dept: SLEEP CENTER | Facility: CLINIC | Age: 56
End: 2021-08-03
Payer: COMMERCIAL

## 2021-08-03 VITALS
BODY MASS INDEX: 28.57 KG/M2 | DIASTOLIC BLOOD PRESSURE: 60 MMHG | HEIGHT: 66 IN | WEIGHT: 177.8 LBS | SYSTOLIC BLOOD PRESSURE: 100 MMHG

## 2021-08-03 DIAGNOSIS — G47.33 OSA (OBSTRUCTIVE SLEEP APNEA): Primary | ICD-10-CM

## 2021-08-03 PROCEDURE — 1036F TOBACCO NON-USER: CPT | Performed by: INTERNAL MEDICINE

## 2021-08-03 PROCEDURE — 99214 OFFICE O/P EST MOD 30 MIN: CPT | Performed by: INTERNAL MEDICINE

## 2021-08-03 PROCEDURE — 3008F BODY MASS INDEX DOCD: CPT | Performed by: INTERNAL MEDICINE

## 2021-08-03 NOTE — PROGRESS NOTES
Progress Note - Sleep Center   Jia Christine :1965 MRN: [id number]      Reason for Visit:  60 y  o female here for annual follow-up    Assessment:  Doing well on current therapy of APAP 4 to 20 cm for moderate HEIKE (GALE = 17 3)  She feels less refreshed than she had in the past   She is also complaining of a large leak from her face mask into her eyes  In addition, she has irritation when she gets up in the morning as well as pressure in her ear causing headache  Plan:  Change from F 20 to F 30    Change pressure to APAP 6 to 9 cm    Follow up: One year    History of Present Illness:  History of HEIKE on PAP therapy  Fully compliant but not experiencing symptomatic improvement      Review of Systems      Genitourinary need to urinate more than twice a night and post menopausal (no peroids)   Cardiology none   Gastrointestinal none   Neurology balance problems   Constitutional none   Integumentary itching   Psychiatry depression   Musculoskeletal none   Pulmonary none   ENT throat clearing and ringing in ears   Endocrine frequent urination   Hematological none       I have reviewed and updated the review of systems as necessary      Historical Information    Past Medical History:   Past Medical History:   Diagnosis Date    Anxiety     Basal cell carcinoma (BCC) 2019    nose    Bilateral breast cysts     Depression     Frequent urination     Hashimoto's disease     Hypothyroidism     Night sweats     Ovarian cyst     Palpitations     improved since lowered caffeine intake    Tinnitus     b/l    Viral syndrome          Past Surgical History:   Past Surgical History:   Procedure Laterality Date    BREAST BIOPSY Right     DCIS    BREAST BIOPSY Left 2018    benign    BREAST LUMPECTOMY Right 2018    Procedure: LUMPECTOMY BREAST NEEDLE LOCALIZED BRACKET;  Surgeon: Roger Morales MD;  [facility];  Service: Surgical Oncology    COLONOSCOPY      ESOPHAGOGASTRODUODENOSCOPY      LYMPH NODE BIOPSY Right 4/24/2018    Procedure: BIOPSY LYMPH NODE SENTINEL;  Surgeon: Roman Huerta MD;  [facility];  Service: Surgical Oncology    MAMMO NEEDLE LOCALIZATION RIGHT (ALL INC) Right 4/24/2018    MAMMO NEEDLE LOCALIZATION RIGHT (ALL INC) EACH ADD Right 4/24/2018    MAMMO STEREOTACTIC BREAST BIOPSY RIGHT (ALL INC) Right 3/1/2018    MRI BREAST BIOPSY LEFT (ALL INCLUSIVE) Left 4/16/2018    WISDOM TOOTH EXTRACTION         Social History:   Social History     Socioeconomic History    Marital status: /Civil Union     Spouse name: None    Number of children: None    Years of education: None    Highest education level: None   Occupational History    None   Tobacco Use    Smoking status: Never Smoker    Smokeless tobacco: Never Used   Vaping Use    Vaping Use: Never used   Substance and Sexual Activity    Alcohol use: Yes     Alcohol/week: 7 0 standard drinks     Types: 7 Standard drinks or equivalent per week     Comment: 7 -8 drinks a week    Drug use: No    Sexual activity: None   Other Topics Concern    None   Social History Narrative    None     Social Determinants of Health     Financial Resource Strain:     Difficulty of Paying Living Expenses:    Food Insecurity:     Worried About Running Out of Food in the Last Year:     Ran Out of Food in the Last Year:    Transportation Needs:     Lack of Transportation (Medical):      Lack of Transportation (Non-Medical):    Physical Activity:     Days of Exercise per Week:     Minutes of Exercise per Session:    Stress:     Feeling of Stress :    Social Connections:     Frequency of Communication with Friends and Family:     Frequency of Social Gatherings with Friends and Family:     Attends Episcopal Services:     Active Member of Clubs or Organizations:     Attends Club or Organization Meetings:     Marital Status:    Intimate Partner Violence:     Fear of Current or Ex-Partner:     Emotionally Abused:     Physically Abused:     Sexually Abused:        Family History:   Family History   Problem Relation Age of Onset    Prostate cancer Paternal Uncle 64   Yocasta Roles Anemia Mother     Kidney disease Mother     Dementia Father     Heart disease Father     Diabetes Father     Glaucoma Father     Hypothyroidism Father     No Known Problems Sister     No Known Problems Maternal Grandmother     No Known Problems Maternal Grandfather     No Known Problems Paternal Grandmother     No Known Problems Paternal Grandfather     No Known Problems Maternal Aunt     No Known Problems Paternal Aunt     No Known Problems Paternal Aunt     Hypothyroidism Brother        Medications/Allergies:      Current Outpatient Medications:     anastrozole (ARIMIDEX) 1 mg tablet, Take 1 tablet (1 mg total) by mouth daily, Disp: 90 tablet, Rfl: 3    Calcium Carb-Cholecalciferol (CALCIUM+D3 PO), Take 1 tablet by mouth daily , Disp: , Rfl:     levothyroxine 88 mcg tablet, Take 1 tablet by mouth daily, Disp: , Rfl:     Multiple Vitamin (MULTIVITAMIN) tablet, Take 1 tablet by mouth daily, Disp: , Rfl:           Objective      Vital Signs:   Vitals:    08/03/21 1434   BP: 100/60     [place] Sleepiness Scale: Total score: 3        Physical Exam:    General: Alert, appropriate, cooperative, overweight    Head: NC/AT    Skin: Warm, dry    Neuro: No motor abnormalities, cranial nerves appear intact    Extremity: No clubbing, cyanosis      DME Provider: [employer]        Counseling / Coordination of Care   I have spent 15 minutes with the patient today in which greater than 50% of this time was spent in counseling/coordination of care regarding: equipment and compliance  Board Certified Sleep Specialist    Portions of the record may have been created with voice recognition software  Occasional wrong word or "sound a like" substitutions may have occurred due to the inherent limitations of voice recognition software  Read the chart carefully and recognize, using context, where substitutions have occurred

## 2021-08-04 ENCOUNTER — TELEPHONE (OUTPATIENT)
Dept: SLEEP CENTER | Facility: CLINIC | Age: 56
End: 2021-08-04

## 2021-08-05 ENCOUNTER — TELEPHONE (OUTPATIENT)
Dept: PULMONOLOGY | Facility: CLINIC | Age: 56
End: 2021-08-05

## 2021-08-05 NOTE — TELEPHONE ENCOUNTER
Patient is calling into our office today returning your phone call from yesterday  Can you please give patient a call back when you have a moment? Thank you

## 2021-08-24 ENCOUNTER — IMMUNIZATIONS (OUTPATIENT)
Dept: FAMILY MEDICINE CLINIC | Facility: HOSPITAL | Age: 56
End: 2021-08-24

## 2021-08-24 DIAGNOSIS — Z23 ENCOUNTER FOR IMMUNIZATION: Primary | ICD-10-CM

## 2021-08-24 PROCEDURE — 91301 SARS-COV-2 / COVID-19 MRNA VACCINE (MODERNA) 100 MCG: CPT

## 2021-08-24 PROCEDURE — 0011A SARS-COV-2 / COVID-19 MRNA VACCINE (MODERNA) 100 MCG: CPT

## 2021-10-06 ENCOUNTER — OFFICE VISIT (OUTPATIENT)
Dept: SURGICAL ONCOLOGY | Facility: CLINIC | Age: 56
End: 2021-10-06
Payer: COMMERCIAL

## 2021-10-06 VITALS
RESPIRATION RATE: 16 BRPM | BODY MASS INDEX: 28.9 KG/M2 | DIASTOLIC BLOOD PRESSURE: 60 MMHG | HEART RATE: 74 BPM | OXYGEN SATURATION: 95 % | HEIGHT: 66 IN | WEIGHT: 179.8 LBS | SYSTOLIC BLOOD PRESSURE: 100 MMHG | TEMPERATURE: 98.4 F

## 2021-10-06 DIAGNOSIS — Z17.0 MALIGNANT NEOPLASM OF UPPER-OUTER QUADRANT OF RIGHT BREAST IN FEMALE, ESTROGEN RECEPTOR POSITIVE (HCC): Primary | ICD-10-CM

## 2021-10-06 DIAGNOSIS — Z79.811 USE OF ANASTROZOLE: ICD-10-CM

## 2021-10-06 DIAGNOSIS — R92.2 DENSE BREAST TISSUE: ICD-10-CM

## 2021-10-06 DIAGNOSIS — C50.411 MALIGNANT NEOPLASM OF UPPER-OUTER QUADRANT OF RIGHT BREAST IN FEMALE, ESTROGEN RECEPTOR POSITIVE (HCC): Primary | ICD-10-CM

## 2021-10-06 PROCEDURE — 99214 OFFICE O/P EST MOD 30 MIN: CPT | Performed by: SURGERY

## 2021-10-13 ENCOUNTER — RADIATION ONCOLOGY FOLLOW-UP (OUTPATIENT)
Dept: RADIATION ONCOLOGY | Facility: CLINIC | Age: 56
End: 2021-10-13
Attending: RADIOLOGY
Payer: COMMERCIAL

## 2021-10-13 VITALS
HEIGHT: 66 IN | BODY MASS INDEX: 29.16 KG/M2 | WEIGHT: 181.44 LBS | TEMPERATURE: 97.4 F | RESPIRATION RATE: 16 BRPM | DIASTOLIC BLOOD PRESSURE: 60 MMHG | HEART RATE: 68 BPM | SYSTOLIC BLOOD PRESSURE: 100 MMHG

## 2021-10-13 DIAGNOSIS — C50.411 MALIGNANT NEOPLASM OF UPPER-OUTER QUADRANT OF RIGHT BREAST IN FEMALE, ESTROGEN RECEPTOR POSITIVE (HCC): Primary | ICD-10-CM

## 2021-10-13 DIAGNOSIS — Z79.811 USE OF ANASTROZOLE: ICD-10-CM

## 2021-10-13 DIAGNOSIS — Z17.0 MALIGNANT NEOPLASM OF UPPER-OUTER QUADRANT OF RIGHT BREAST IN FEMALE, ESTROGEN RECEPTOR POSITIVE (HCC): Primary | ICD-10-CM

## 2021-10-13 PROCEDURE — 99213 OFFICE O/P EST LOW 20 MIN: CPT | Performed by: RADIOLOGY

## 2021-10-13 PROCEDURE — 99211 OFF/OP EST MAY X REQ PHY/QHP: CPT | Performed by: RADIOLOGY

## 2021-10-13 PROCEDURE — G0463 HOSPITAL OUTPT CLINIC VISIT: HCPCS | Performed by: RADIOLOGY

## 2021-10-18 ENCOUNTER — OFFICE VISIT (OUTPATIENT)
Dept: FAMILY MEDICINE CLINIC | Facility: CLINIC | Age: 56
End: 2021-10-18
Payer: COMMERCIAL

## 2021-10-18 VITALS
HEIGHT: 66 IN | WEIGHT: 180 LBS | SYSTOLIC BLOOD PRESSURE: 102 MMHG | TEMPERATURE: 97.8 F | DIASTOLIC BLOOD PRESSURE: 60 MMHG | BODY MASS INDEX: 28.93 KG/M2 | HEART RATE: 70 BPM

## 2021-10-18 DIAGNOSIS — C44.311 BASAL CELL CARCINOMA (BCC) OF SKIN OF NOSE: ICD-10-CM

## 2021-10-18 DIAGNOSIS — Z17.0 MALIGNANT NEOPLASM OF UPPER-OUTER QUADRANT OF RIGHT BREAST IN FEMALE, ESTROGEN RECEPTOR POSITIVE (HCC): ICD-10-CM

## 2021-10-18 DIAGNOSIS — G47.33 OSA (OBSTRUCTIVE SLEEP APNEA): ICD-10-CM

## 2021-10-18 DIAGNOSIS — Z13.1 SCREENING FOR DIABETES MELLITUS: ICD-10-CM

## 2021-10-18 DIAGNOSIS — E03.9 ACQUIRED HYPOTHYROIDISM: ICD-10-CM

## 2021-10-18 DIAGNOSIS — Z13.0 SCREENING FOR DEFICIENCY ANEMIA: ICD-10-CM

## 2021-10-18 DIAGNOSIS — Z13.220 SCREENING FOR LIPID DISORDERS: ICD-10-CM

## 2021-10-18 DIAGNOSIS — Z00.00 ANNUAL PHYSICAL EXAM: Primary | ICD-10-CM

## 2021-10-18 DIAGNOSIS — Z11.1 SCREENING FOR TUBERCULOSIS: ICD-10-CM

## 2021-10-18 DIAGNOSIS — C50.411 MALIGNANT NEOPLASM OF UPPER-OUTER QUADRANT OF RIGHT BREAST IN FEMALE, ESTROGEN RECEPTOR POSITIVE (HCC): ICD-10-CM

## 2021-10-18 PROCEDURE — 1036F TOBACCO NON-USER: CPT | Performed by: NURSE PRACTITIONER

## 2021-10-18 PROCEDURE — 3008F BODY MASS INDEX DOCD: CPT | Performed by: NURSE PRACTITIONER

## 2021-10-18 PROCEDURE — 99396 PREV VISIT EST AGE 40-64: CPT | Performed by: NURSE PRACTITIONER

## 2021-10-18 PROCEDURE — 99214 OFFICE O/P EST MOD 30 MIN: CPT | Performed by: NURSE PRACTITIONER

## 2021-10-18 PROCEDURE — 86580 TB INTRADERMAL TEST: CPT | Performed by: NURSE PRACTITIONER

## 2021-10-20 ENCOUNTER — CLINICAL SUPPORT (OUTPATIENT)
Dept: FAMILY MEDICINE CLINIC | Facility: CLINIC | Age: 56
End: 2021-10-20

## 2021-10-20 DIAGNOSIS — Z11.1 SCREENING FOR TUBERCULOSIS: Primary | ICD-10-CM

## 2021-10-20 LAB
INDURATION: 0 MM
TB SKIN TEST: NEGATIVE

## 2021-10-21 DIAGNOSIS — N30.00 ACUTE CYSTITIS WITHOUT HEMATURIA: Primary | ICD-10-CM

## 2021-10-21 RX ORDER — NITROFURANTOIN 25; 75 MG/1; MG/1
100 CAPSULE ORAL 2 TIMES DAILY
Qty: 10 CAPSULE | Refills: 0 | Status: SHIPPED | OUTPATIENT
Start: 2021-10-21 | End: 2021-10-26

## 2021-10-22 LAB
ALBUMIN SERPL-MCNC: 4.1 G/DL (ref 3.6–5.1)
ALBUMIN/GLOB SERPL: 1.4 (CALC) (ref 1–2.5)
ALP SERPL-CCNC: 85 U/L (ref 37–153)
ALT SERPL-CCNC: 29 U/L (ref 6–29)
APPEARANCE UR: CLEAR
AST SERPL-CCNC: 28 U/L (ref 10–35)
BACTERIA UR QL AUTO: ABNORMAL /HPF
BASOPHILS # BLD AUTO: 62 CELLS/UL (ref 0–200)
BASOPHILS NFR BLD AUTO: 1 %
BILIRUB SERPL-MCNC: 0.6 MG/DL (ref 0.2–1.2)
BILIRUB UR QL STRIP: NEGATIVE
BUN SERPL-MCNC: 16 MG/DL (ref 7–25)
BUN/CREAT SERPL: NORMAL (CALC) (ref 6–22)
CALCIUM SERPL-MCNC: 9.4 MG/DL (ref 8.6–10.4)
CHLORIDE SERPL-SCNC: 102 MMOL/L (ref 98–110)
CHOLEST SERPL-MCNC: 210 MG/DL
CHOLEST/HDLC SERPL: 3.2 (CALC)
CO2 SERPL-SCNC: 30 MMOL/L (ref 20–32)
COLOR UR: YELLOW
CREAT SERPL-MCNC: 0.77 MG/DL (ref 0.5–1.05)
EOSINOPHIL # BLD AUTO: 192 CELLS/UL (ref 15–500)
EOSINOPHIL NFR BLD AUTO: 3.1 %
ERYTHROCYTE [DISTWIDTH] IN BLOOD BY AUTOMATED COUNT: 12.5 % (ref 11–15)
GLOBULIN SER CALC-MCNC: 2.9 G/DL (CALC) (ref 1.9–3.7)
GLUCOSE SERPL-MCNC: 98 MG/DL (ref 65–99)
GLUCOSE UR QL STRIP: NEGATIVE
HCT VFR BLD AUTO: 41.7 % (ref 35–45)
HDLC SERPL-MCNC: 66 MG/DL
HGB BLD-MCNC: 13.8 G/DL (ref 11.7–15.5)
HGB UR QL STRIP: NEGATIVE
HYALINE CASTS #/AREA URNS LPF: ABNORMAL /LPF
KETONES UR QL STRIP: NEGATIVE
LDLC SERPL CALC-MCNC: 126 MG/DL (CALC)
LEUKOCYTE ESTERASE UR QL STRIP: ABNORMAL
LYMPHOCYTES # BLD AUTO: 2406 CELLS/UL (ref 850–3900)
LYMPHOCYTES NFR BLD AUTO: 38.8 %
MCH RBC QN AUTO: 29.9 PG (ref 27–33)
MCHC RBC AUTO-ENTMCNC: 33.1 G/DL (ref 32–36)
MCV RBC AUTO: 90.3 FL (ref 80–100)
MONOCYTES # BLD AUTO: 639 CELLS/UL (ref 200–950)
MONOCYTES NFR BLD AUTO: 10.3 %
NEUTROPHILS # BLD AUTO: 2902 CELLS/UL (ref 1500–7800)
NEUTROPHILS NFR BLD AUTO: 46.8 %
NITRITE UR QL STRIP: NEGATIVE
NONHDLC SERPL-MCNC: 144 MG/DL (CALC)
PH UR STRIP: 6 [PH] (ref 5–8)
PLATELET # BLD AUTO: 219 THOUSAND/UL (ref 140–400)
PMV BLD REES-ECKER: 11.1 FL (ref 7.5–12.5)
POTASSIUM SERPL-SCNC: 4.1 MMOL/L (ref 3.5–5.3)
PROT SERPL-MCNC: 7 G/DL (ref 6.1–8.1)
PROT UR QL STRIP: NEGATIVE
RBC # BLD AUTO: 4.62 MILLION/UL (ref 3.8–5.1)
RBC #/AREA URNS HPF: ABNORMAL /HPF
SL AMB EGFR AFRICAN AMERICAN: 99 ML/MIN/1.73M2
SL AMB EGFR NON AFRICAN AMERICAN: 85 ML/MIN/1.73M2
SODIUM SERPL-SCNC: 139 MMOL/L (ref 135–146)
SP GR UR STRIP: 1.01 (ref 1–1.03)
SPECIMEN SOURCE CVX/VAG CYTO: NORMAL
SQUAMOUS #/AREA URNS HPF: ABNORMAL /HPF
TRANSFUSION STATUS PATIENT QL: NORMAL
TRIGL SERPL-MCNC: 83 MG/DL
VZV AG SPEC QL IF: NORMAL
WBC # BLD AUTO: 6.2 THOUSAND/UL (ref 3.8–10.8)
WBC #/AREA URNS HPF: ABNORMAL /HPF

## 2022-02-16 ENCOUNTER — HOSPITAL ENCOUNTER (OUTPATIENT)
Dept: MAMMOGRAPHY | Facility: CLINIC | Age: 57
Discharge: HOME | End: 2022-02-16
Payer: COMMERCIAL

## 2022-02-16 VITALS — WEIGHT: 180 LBS | HEIGHT: 66 IN | BODY MASS INDEX: 28.93 KG/M2

## 2022-02-16 DIAGNOSIS — Z17.0 MALIGNANT NEOPLASM OF UPPER-OUTER QUADRANT OF RIGHT BREAST IN FEMALE, ESTROGEN RECEPTOR POSITIVE (HCC): ICD-10-CM

## 2022-02-16 DIAGNOSIS — C50.411 MALIGNANT NEOPLASM OF UPPER-OUTER QUADRANT OF RIGHT BREAST IN FEMALE, ESTROGEN RECEPTOR POSITIVE (HCC): ICD-10-CM

## 2022-02-16 PROCEDURE — 77066 DX MAMMO INCL CAD BI: CPT

## 2022-02-16 PROCEDURE — G0279 TOMOSYNTHESIS, MAMMO: HCPCS

## 2022-03-09 ENCOUNTER — OFFICE VISIT (OUTPATIENT)
Dept: SLEEP CENTER | Facility: CLINIC | Age: 57
End: 2022-03-09
Payer: COMMERCIAL

## 2022-03-09 VITALS
SYSTOLIC BLOOD PRESSURE: 80 MMHG | BODY MASS INDEX: 29.25 KG/M2 | DIASTOLIC BLOOD PRESSURE: 62 MMHG | HEIGHT: 66 IN | WEIGHT: 182 LBS

## 2022-03-09 DIAGNOSIS — G47.33 OSA (OBSTRUCTIVE SLEEP APNEA): Primary | ICD-10-CM

## 2022-03-09 PROCEDURE — 99213 OFFICE O/P EST LOW 20 MIN: CPT | Performed by: INTERNAL MEDICINE

## 2022-03-09 PROCEDURE — 3008F BODY MASS INDEX DOCD: CPT | Performed by: INTERNAL MEDICINE

## 2022-03-09 NOTE — PROGRESS NOTES
Progress Note - Sleep Center   Lotus Rivera :1965 MRN: [id number]      Reason for Visit:  60 y  o female here for annual follow-up    Assessment:  Doing well on current therapy of APAP 6 to 9 cm for moderate HEIKE  I will increase the pressure to 6 to 10 cm due to the high AHI just over 10  Last year her AHI was just over 12  Perhaps she needs a slightly higher pressure  When her pressure was too high, she developed vertigo  Plan:  Change pressure as above    Follow up: One year    History of Present Illness:  History of HEIKE on PAP therapy  Fully compliant and deriving benefit      Review of Systems      Genitourinary post menopausal (no peroids)   Cardiology palpitations/fluttering feeling in the chest   Gastrointestinal none   Neurology numbness/tingling of an extremity   Constitutional none   Integumentary none   Psychiatry none   Musculoskeletal none   Pulmonary none   ENT ringing in ears   Endocrine frequent urination   Hematological none         I have reviewed and updated the review of systems as necessary      Historical Information    Past Medical History:   Past Medical History:   Diagnosis Date    Anxiety     Basal cell carcinoma (BCC) 2019    nose    Bilateral breast cysts     Breast cancer ([facility] [address] ) 2018    Right    Depression     Frequent urination     Hashimoto's disease     Hypothyroidism     Night sweats     Ovarian cyst     Palpitations     improved since lowered caffeine intake    Tinnitus     b/l    Viral syndrome          Past Surgical History:   Past Surgical History:   Procedure Laterality Date    BREAST BIOPSY Right     DCIS    BREAST BIOPSY Left 2018    benign    BREAST LUMPECTOMY Right 2018    Procedure: LUMPECTOMY BREAST NEEDLE LOCALIZED BRACKET;  Surgeon: Silver Escobar MD;  [facility];  Service: Surgical Oncology    COLONOSCOPY      ESOPHAGOGASTRODUODENOSCOPY      LYMPH NODE BIOPSY Right 2018    Procedure: BIOPSY LYMPH NODE SENTINEL;  Surgeon: Chaitanya Luna MD;  [facility];  Service: Surgical Oncology    MAMMO NEEDLE LOCALIZATION RIGHT (ALL INC) Right 4/24/2018    MAMMO NEEDLE LOCALIZATION RIGHT (ALL INC) EACH ADD Right 4/24/2018    MAMMO STEREOTACTIC BREAST BIOPSY RIGHT (ALL INC) Right 3/1/2018    MRI BREAST BIOPSY LEFT (ALL INCLUSIVE) Left 4/16/2018    WISDOM TOOTH EXTRACTION         Social History:   Social History     Socioeconomic History    Marital status: /Civil Union     Spouse name: Not on file    Number of children: Not on file    Years of education: Not on file    Highest education level: Not on file   Occupational History    Not on file   Tobacco Use    Smoking status: Never Smoker    Smokeless tobacco: Never Used   Vaping Use    Vaping Use: Never used   Substance and Sexual Activity    Alcohol use:  Yes     Alcohol/week: 7 0 standard drinks     Types: 7 Standard drinks or equivalent per week     Comment: 7 -8 drinks a week    Drug use: No    Sexual activity: Not on file   Other Topics Concern    Not on file   Social History Narrative    Not on file     Social Determinants of Health     Financial Resource Strain: Not on file   Food Insecurity: Not on file   Transportation Needs: Not on file   Physical Activity: Not on file   Stress: Not on file   Social Connections: Not on file   Intimate Partner Violence: Not on file   Housing Stability: Not on file       Family History:   Family History   Problem Relation Age of Onset    Prostate cancer Paternal Uncle 64   Kiera Lama Anemia Mother     Kidney disease Mother     Dementia Father     Heart disease Father     Diabetes Father     Glaucoma Father     Hypothyroidism Father     No Known Problems Sister     No Known Problems Maternal Grandmother     No Known Problems Maternal Grandfather     No Known Problems Paternal Grandmother     No Known Problems Paternal Grandfather     No Known Problems Maternal Aunt     No Known Problems Paternal Aunt  No Known Problems Paternal Aunt     Hypothyroidism Brother        Medications/Allergies:      Current Outpatient Medications:     anastrozole (ARIMIDEX) 1 mg tablet, Take 1 tablet (1 mg total) by mouth daily, Disp: 90 tablet, Rfl: 3    Calcium Carb-Cholecalciferol (CALCIUM+D3 PO), Take 1 tablet by mouth daily , Disp: , Rfl:     levothyroxine 88 mcg tablet, Take 1 tablet by mouth daily, Disp: , Rfl:     Multiple Vitamin (MULTIVITAMIN) tablet, Take 1 tablet by mouth daily, Disp: , Rfl:           Objective      Vital Signs:   Vitals:    03/09/22 1326   BP: (!) 80/62     [place] Sleepiness Scale: Total score: 5        Physical Exam:    General: Alert, appropriate, cooperative, overweight    Head: NC/AT    Skin: Warm, dry    Neuro: No motor abnormalities, cranial nerves appear intact    Extremity: No clubbing, cyanosis      DME Provider: [employer]        Counseling / Coordination of Care   I have spent 15 minutes with the patient today in which greater than 50% of this time was spent in counseling/coordination of care regarding: equipment and compliance  Board Certified Sleep Specialist    Portions of the record may have been created with voice recognition software  Occasional wrong word or "sound a like" substitutions may have occurred due to the inherent limitations of voice recognition software  Read the chart carefully and recognize, using context, where substitutions have occurred

## 2022-03-10 ENCOUNTER — TELEPHONE (OUTPATIENT)
Dept: SLEEP CENTER | Facility: CLINIC | Age: 57
End: 2022-03-10

## 2022-03-10 NOTE — TELEPHONE ENCOUNTER
Rx for PAP resupply faxed to [facility]  [facility]ier[facility]'s representative made aware of pressure change order

## 2022-03-11 ENCOUNTER — TELEPHONE (OUTPATIENT)
Dept: SLEEP CENTER | Facility: CLINIC | Age: 57
End: 2022-03-11

## 2022-03-25 ENCOUNTER — IMMUNIZATIONS (OUTPATIENT)
Dept: FAMILY MEDICINE CLINIC | Facility: HOSPITAL | Age: 57
End: 2022-03-25

## 2022-03-25 DIAGNOSIS — Z23 ENCOUNTER FOR IMMUNIZATION: Primary | ICD-10-CM

## 2022-03-25 PROCEDURE — 0064A COVID-19 MODERNA VACC 0.25 ML BOOSTER: CPT

## 2022-03-25 PROCEDURE — 91306 COVID-19 MODERNA VACC 0.25 ML BOOSTER: CPT

## 2022-04-25 ENCOUNTER — OFFICE VISIT (OUTPATIENT)
Dept: HEMATOLOGY ONCOLOGY | Facility: CLINIC | Age: 57
End: 2022-04-25
Payer: COMMERCIAL

## 2022-04-25 VITALS
DIASTOLIC BLOOD PRESSURE: 68 MMHG | TEMPERATURE: 98.1 F | WEIGHT: 182 LBS | HEART RATE: 73 BPM | HEIGHT: 66 IN | RESPIRATION RATE: 18 BRPM | SYSTOLIC BLOOD PRESSURE: 114 MMHG | BODY MASS INDEX: 29.25 KG/M2 | OXYGEN SATURATION: 94 %

## 2022-04-25 DIAGNOSIS — Z17.0 MALIGNANT NEOPLASM OF UPPER-OUTER QUADRANT OF RIGHT BREAST IN FEMALE, ESTROGEN RECEPTOR POSITIVE (HCC): Primary | ICD-10-CM

## 2022-04-25 DIAGNOSIS — C50.411 MALIGNANT NEOPLASM OF UPPER-OUTER QUADRANT OF RIGHT BREAST IN FEMALE, ESTROGEN RECEPTOR POSITIVE (HCC): Primary | ICD-10-CM

## 2022-04-25 PROCEDURE — 3008F BODY MASS INDEX DOCD: CPT | Performed by: INTERNAL MEDICINE

## 2022-04-25 PROCEDURE — 99214 OFFICE O/P EST MOD 30 MIN: CPT | Performed by: INTERNAL MEDICINE

## 2022-04-25 RX ORDER — ANASTROZOLE 1 MG/1
1 TABLET ORAL DAILY
Qty: 90 TABLET | Refills: 3 | Status: SHIPPED | OUTPATIENT
Start: 2022-04-25

## 2022-04-25 NOTE — PROGRESS NOTES
Hematology / Oncology Outpatient Follow Up Note    Particia Radha 60 y o  female :1965 ZOT:[id number]         Date:  2022    Assessment / Plan:  A 78-year-old postmenopausal woman with stage IA right breast cancer, grade 2, ER 90% positive, [place] negative, HER2 equivocal disease   Interestingly enough, initial biopsy which showed micro invasion with ER negative, [place] negative HER2 3+ disease   She underwent lumpectomy with sentinel lymph node biopsy, resulting in FILEMON   Her tumor size was 4 mm    She is currently on adjuvant hormonal therapy with anastrozole with no significant toxicity  Clinically, she has no evidence recurrent disease  I recommended her to continue anastrozole for 1 more year to complete 5 years of adjuvant hormonal therapy  She is in agreement with my recommendations  I will see her again in a year for routine follow-up              Subjective:      HPI:  A 38-year-old postmenopausal woman who had last menstrual cycle at age of 44 was found to have mammographic abnormality in her right breast   Therefore, she underwent right breast biopsy in 2018 which showed micro invasive carcinoma with extensive DCIS   Micro invasive carcinoma was ER negative, [place] negative, HER2 3+ disease   Subsequently, she underwent lumpectomy with sentinel lymph node biopsy by Dr Peacock Diss in 2018   She had 4 x 3 mm of invasive ductal carcinoma, grade 2   Lymphovascular invasion was not seen   One sentinel lymph node was negative for metastatic disease   This was ER 90% positive, [place] negative, HER2 2+ disease   Malia Peña fish was equivocal with ratio of 1 4   She presents today to discuss adjuvant treatment options   She has no complaint of pain   However, since last week, she has noticed some erythematous skin change at the lumpectomy site  Buzzy Mad is no fever, chills or night sweats   She denied any respiratory symptoms   She only has hypothyroidism as past medical history  Godwin Deutsch has no history of major surgery   She denied family history of breast or ovarian cancer   Her performance status is normal            Interval History:  A 76-year-old postmenopausal woman with stage IA right breast cancer, grade 2, ER 90% positive, [place] negative, HER2 equivocal disease   Interestingly enough, initial biopsy which showed micro invasion was ER negative, [place] negative HER2 3+ disease   She underwent lumpectomy with sentinel lymph node biopsy, resulting in FILEMON   Her tumor size was 4 mm    We had extensive discussion regarding trastuzumab, possible adjuvant chemotherapy as well as anastrozole monotherapy   She chose to be on anastrozole monotherapy, which she has been on since May 2018  She presents today for routine follow-up  She feels well with no new complaint  She denied hot flashes or musculoskeletal symptoms  She has no complaint of pain  Her weight is stable  She is up to date for all the COVID 19 vaccinations  Her mammography in February 2022 was benign    Her performance status is normal         Objective:      Primary Diagnosis:     1   Right breast cancer, stage IA  (pT1a, pN0, M0) grade 2, ER 90% positive, [place] negative, HER2 fish equivocal  (initial biopsy report micro invasive cancer, ER negative, [place] negative, HER2 3+ disease )  Diagnosed in April 2018      Cancer Staging:  Cancer Staging   Malignant neoplasm of upper-outer quadrant of right breast in female, estrogen receptor positive ([facility] [address] )  Staging form: Breast, AJCC 8th Edition  - Clinical: cT1mi, cN0, cM0 - Signed by Quinn Licona MD on 3/14/2018  - Pathologic: Stage IA (pT1a, pN0(sn), cM0, G2, ER: Positive, [place]: Negative, HER2: Equivocal) - Signed by Quinn Licona MD on 5/8/2018           Previous Hematologic/ Oncologic Treatment:            Current Hematologic/ Oncologic Treatment:       Adjuvant hormonal therapy with anastrozole since June 2018      Disease Status:      FILEMON status post lumpectomy with sentinel lymph node biopsy      Test Results:     Pathology:     Initial biopsy showed micro invasive cancer with extensive DCIS   Micro invasive part was ER negative, CT negative, her 2 3+ disease        Lumpectomy showed 4 x 3 mm of invasive ductal carcinoma, grade 2   No evidence of lymphovascular invasion   1 sentinel lymph node was negative for metastatic disease   This was ER 90 -95 % positive, CT negative, HER2  2+ disease  Dorethia Sack fish was equivocal with ratio of 1 4   Stage IA (pT1a, pN0, M0)     Radiology:     Mammography in February 2022 was benign   BI-RADS 2      Laboratory:     CBC and CMP are within normal limits               Physical Exam:        General Appearance:    Alert, oriented          Eyes:    PERRL   Ears:    Normal external ear canals, both ears   Nose:   Nares normal, septum midline   Throat:   Mucosa moist  Pharynx without injection  Neck:   Supple         Lungs:     Clear to auscultation bilaterally   Chest Wall:    No tenderness or deformity    Heart:    Regular rate and rhythm         Abdomen:     Soft, non-tender, bowel sounds +, no organomegaly               Extremities:   Extremities no cyanosis or edema         Skin:   no rash or icterus  Lymph nodes:   Cervical, supraclavicular, and axillary nodes normal   Neurologic:   CNII-XII intact, normal strength, sensation and reflexes     Throughout             Breast exam:   Lumpectomy scar at lateral aspect of her right breast with minor erythematous skin change   No palpable abnormality   Left breast exam is negative  ROS: Review of Systems   All other systems reviewed and are negative  Imaging: No results found        Labs:   Lab Results   Component Value Date    WBC 6 2 10/20/2021    HGB 13 8 10/20/2021    HCT 41 7 10/20/2021    MCV 90 3 10/20/2021     10/20/2021     Lab Results   Component Value Date    K 4 1 10/20/2021     10/20/2021    CO2 30 10/20/2021    BUN 16 10/20/2021    CREATININE 0 77 10/20/2021    GLUF 93 10/25/2019 CALCIUM 9 4 10/20/2021    AST 28 10/20/2021    ALT 29 10/20/2021    ALKPHOS 85 10/20/2021    EGFR 89 04/12/2018           Current Medications: Reviewed  Allergies: Reviewed  PMH/FH/SH:  Reviewed      Vital Sign:    Body surface area is 1 92 meters squared      Wt Readings from Last 3 Encounters:   04/25/22 82 6 kg (182 lb)   03/09/22 82 6 kg (182 lb)   02/16/22 81 6 kg (180 lb)        Temp Readings from Last 3 Encounters:   04/25/22 98 1 °F (36 7 °C) (Tympanic Core)   10/18/21 97 8 °F (36 6 °C) (Oral)   10/13/21 (!) 97 4 °F (36 3 °C) (Temporal)        BP Readings from Last 3 Encounters:   04/25/22 114/68   03/09/22 (!) 80/62   10/18/21 102/60         Pulse Readings from Last 3 Encounters:   04/25/22 73   10/18/21 70   10/13/21 68     @LASTSAO2(3)@

## 2022-05-04 ENCOUNTER — OFFICE VISIT (OUTPATIENT)
Dept: SURGICAL ONCOLOGY | Facility: CLINIC | Age: 57
End: 2022-05-04
Payer: COMMERCIAL

## 2022-05-04 ENCOUNTER — TELEPHONE (OUTPATIENT)
Dept: SURGICAL ONCOLOGY | Facility: CLINIC | Age: 57
End: 2022-05-04

## 2022-05-04 VITALS
WEIGHT: 182 LBS | BODY MASS INDEX: 29.25 KG/M2 | HEIGHT: 66 IN | DIASTOLIC BLOOD PRESSURE: 68 MMHG | SYSTOLIC BLOOD PRESSURE: 110 MMHG | TEMPERATURE: 97.8 F

## 2022-05-04 DIAGNOSIS — C50.411 MALIGNANT NEOPLASM OF UPPER-OUTER QUADRANT OF RIGHT BREAST IN FEMALE, ESTROGEN RECEPTOR POSITIVE (HCC): Primary | ICD-10-CM

## 2022-05-04 DIAGNOSIS — Z17.0 MALIGNANT NEOPLASM OF UPPER-OUTER QUADRANT OF RIGHT BREAST IN FEMALE, ESTROGEN RECEPTOR POSITIVE (HCC): Primary | ICD-10-CM

## 2022-05-04 DIAGNOSIS — R92.2 DENSE BREAST TISSUE: ICD-10-CM

## 2022-05-04 DIAGNOSIS — Z79.811 USE OF ANASTROZOLE: ICD-10-CM

## 2022-05-04 PROCEDURE — 3008F BODY MASS INDEX DOCD: CPT | Performed by: SURGERY

## 2022-05-04 PROCEDURE — 1036F TOBACCO NON-USER: CPT | Performed by: SURGERY

## 2022-05-04 PROCEDURE — 99214 OFFICE O/P EST MOD 30 MIN: CPT | Performed by: SURGERY

## 2022-05-04 NOTE — TELEPHONE ENCOUNTER
Called and spoke to Dr Berenice Mancilla and scheduled her 6 month follow up   Patient agreeable  She is aware she can access today's AVS and appointment via My Chart

## 2022-05-04 NOTE — PROGRESS NOTES
Surgical Oncology Follow Up       [address] [facility] SURGICAL ONCOLOGY [facility] [id number]    Stormy Beltrán  1965  [id number]  [facility] SURGICAL ONCOLOGY [place]  [place] [id number]    Chief Complaint   Patient presents with    Follow-up     61 y/o female here for a 6 mo F/U  Assessment/Plan   Diagnoses and all orders for this visit:    Malignant neoplasm of upper-outer quadrant of right breast in female, estrogen receptor positive ([facility] [address] )    Dense breast tissue    Use of anastrozole        Advance Care Planning/Advance Directives:  Discussed disease status, cancer treatment plans and/or cancer treatment goals with the patient  Oncology History:    Oncology History   Malignant neoplasm of upper-outer quadrant of right breast in female, estrogen receptor positive ([facility] [address] )   3/1/2018 Initial Diagnosis    Malignant neoplasm of upper-outer quadrant of right breast in female, estrogen receptor positive ([facility] [address] )     3/1/2018 Biopsy    RIGHT breast biopsy:  ER/MO negative, HER2 by IHC 3+ positive  Microinvasive carcinoma arising in extensive high-grade ductal carcinoma in-situ (DCIS), grade        4/24/2018 Surgery    LUMPECTOMY BREAST NEEDLE LOCALIZED BRACKET (Right)  BIOPSY LYMPH NODE SENTINEL (Right) - Dr Humaira Scruggs     4/24/2018 -  Cancer Staged    Stage IA - pT1a, pN0(sn), cM0, G2, ER 90-95% positive, MO <1% negative, HER2 by IHC 2+ equivocal; FISH pending  4 mm IDC is largest focus-2 additional 0 8mm foci and extensive DCIS  No LVI  Extensive LCIS  Margins negative for invasive and in situ  Lymph node assessment/status 0/3        5/2018 -  Hormone Therapy    Anastrozole 1 mg daily    - Dr Gerda Ellis     6/19/2018 - 7/18/2018 Radiation    Right breast to 4256 cGy in 16 fractions followed by a boost to the lumpectomy cavity of an additional 1000 cGy         - Dr Delores Mcclain         History of Present Illness: Breast cancer follow-up, reports no concerns, continues on anastrozole  -Interval History:  Recent mammogram    Review of Systems:  Review of Systems   Constitutional: Negative  Negative for appetite change and fever  Eyes: Negative  Respiratory: Negative for shortness of breath  Cardiovascular: Negative  Gastrointestinal: Negative  Endocrine: Negative  Genitourinary: Negative  Musculoskeletal: Negative  Negative for arthralgias and myalgias  Skin: Negative  Allergic/Immunologic: Negative  Neurological: Negative  Hematological: Negative  Negative for adenopathy  Does not bruise/bleed easily  Psychiatric/Behavioral: Negative          Patient Active Problem List   Diagnosis    Malignant neoplasm of upper-outer quadrant of right breast in female, estrogen receptor positive ([facility] [address] )    Dense breast tissue    Use of anastrozole    Pelvic pain    Basal cell carcinoma (BCC) of skin of nose    Acquired hypothyroidism    HEIKE (obstructive sleep apnea)    Overweight     Past Medical History:   Diagnosis Date    Anxiety     Basal cell carcinoma (BCC) 2019    nose    Bilateral breast cysts     Depression     Frequent urination     Hashimoto's disease     Hypothyroidism     Night sweats     Ovarian cyst     Palpitations     improved since lowered caffeine intake    Tinnitus     b/l    Viral syndrome      Past Surgical History:   Procedure Laterality Date    BREAST BIOPSY Right 03 01/18    DCIS    BREAST BIOPSY Left 04/2018    benign    BREAST LUMPECTOMY Right 4/24/2018    Procedure: LUMPECTOMY BREAST NEEDLE LOCALIZED BRACKET;  Surgeon: Tamara Martinez MD;  Location: [facility];  Service: Surgical Oncology    COLONOSCOPY      ESOPHAGOGASTRODUODENOSCOPY      LYMPH NODE BIOPSY Right 4/24/2018    Procedure: BIOPSY LYMPH NODE SENTINEL;  Surgeon: Tamara Martinez MD;  Location: [facility];  Service: Surgical Oncology    MAMMO NEEDLE LOCALIZATION RIGHT (ALL INC) Right 4/24/2018    MAMMO NEEDLE LOCALIZATION RIGHT (ALL INC) EACH ADD Right 4/24/2018    MAMMO STEREOTACTIC BREAST BIOPSY RIGHT (ALL INC) Right 3/1/2018    MRI BREAST BIOPSY LEFT (ALL INCLUSIVE) Left 4/16/2018    WISDOM TOOTH EXTRACTION       Family History   Problem Relation Age of Onset    Prostate cancer Paternal Uncle 64   Julieann Frankel Anemia Mother     Kidney disease Mother     Dementia Father     Heart disease Father     Diabetes Father     Glaucoma Father     Hypothyroidism Father     No Known Problems Sister     No Known Problems Maternal Grandmother     No Known Problems Maternal Grandfather     No Known Problems Paternal Grandmother     No Known Problems Paternal Grandfather     No Known Problems Maternal Aunt     No Known Problems Paternal Aunt     No Known Problems Paternal Aunt     Hypothyroidism Brother      Social History     Socioeconomic History    Marital status: /Civil Union     Spouse name: Not on file    Number of children: Not on file    Years of education: Not on file    Highest education level: Not on file   Occupational History    Not on file   Tobacco Use    Smoking status: Never Smoker    Smokeless tobacco: Never Used   Vaping Use    Vaping Use: Never used   Substance and Sexual Activity    Alcohol use:  Yes     Alcohol/week: 7 0 standard drinks     Types: 7 Standard drinks or equivalent per week     Comment: 7 -8 drinks a week    Drug use: No    Sexual activity: Not on file   Other Topics Concern    Not on file   Social History Narrative    Not on file     Social Determinants of Health     Financial Resource Strain: Not on file   Food Insecurity: Not on file   Transportation Needs: Not on file   Physical Activity: Not on file   Stress: Not on file   Social Connections: Not on file   Intimate Partner Violence: Not on file   Housing Stability: Not on file       Current Outpatient Medications:     anastrozole (ARIMIDEX) 1 mg tablet, Take 1 tablet (1 mg total) by mouth daily, Disp: 90 tablet, Rfl: 3    Calcium Carb-Cholecalciferol (CALCIUM+D3 PO), Take 1 tablet by mouth daily , Disp: , Rfl:     levothyroxine 88 mcg tablet, Take 1 tablet by mouth daily, Disp: , Rfl:     Multiple Vitamin (MULTIVITAMIN) tablet, Take 1 tablet by mouth daily, Disp: , Rfl:   Allergies   Allergen Reactions    Clindamycin Other (See Comments)     Was painful to walk     Erythromycin Hives    Sulfamethoxazole-Trimethoprim Hives    Amoxicillin Rash       The following portions of the patient's history were reviewed and updated as appropriate: allergies, current medications, past family history, past medical history, past social history, past surgical history and problem list         Vitals:    05/04/22 1420   BP: 110/68   Temp: 97 8 °F (36 6 °C)       Physical Exam  Constitutional:       General: She is not in acute distress  Appearance: Normal appearance  She is well-developed  HENT:      Head: Normocephalic and atraumatic  Cardiovascular:      Heart sounds: Normal heart sounds  Pulmonary:      Breath sounds: Normal breath sounds  Chest:   Breasts:      Right: Skin change (Lumpectomy scar) present  No inverted nipple, mass, nipple discharge, tenderness, axillary adenopathy or supraclavicular adenopathy  Left: No inverted nipple, mass, nipple discharge, skin change, tenderness, axillary adenopathy or supraclavicular adenopathy  Abdominal:      Palpations: Abdomen is soft  Lymphadenopathy:      Upper Body:      Right upper body: No supraclavicular, axillary or pectoral adenopathy  Left upper body: No supraclavicular, axillary or pectoral adenopathy  Neurological:      Mental Status: She is alert and oriented to person, place, and time  Psychiatric:         Mood and Affect: Mood normal            Results:  Labs:      Imaging  02/16/2022 bilateral 3D diagnostic mammogram benign BI-RADS two with a density of three    I reviewed the above imaging data      Discussion/Summary: 40-year-old female status post right breast conservation for stage IA invasive carcinoma  She had radiation therapy and continues on anastrozole  There is no evidence of disease based on exam today  Her recent mammogram was benign  She continues to have dense breast tissue  I will plan to see her again in roughly six months for another exam or sooner should the need arise

## 2022-11-03 ENCOUNTER — TELEPHONE (OUTPATIENT)
Dept: HEMATOLOGY ONCOLOGY | Facility: CLINIC | Age: 57
End: 2022-11-03

## 2022-11-03 NOTE — TELEPHONE ENCOUNTER
Appointment Cancellation Or Reschedule     Person calling in Patient    If other than patient calling, are they listed on the communication consent form? Provider Dr Piper Self   Office Visit Date and Time 12/19 at 3:30pm    Office Visit Location [place]   Did patient want to reschedule their office appointment? If so, when was it scheduled to? Yes, 2/8 at 3:45pm    Did you have STAR scheduled for this appointment? No   Do you need STAR set up for your new appointment? If yes, please send to "PATIENT RIDESHARE" pool for STAR rescheduling n/a   If you are cancelling appointment, can we notify STAR to cancel ride? If yes, please send to "PATIENT RIDESHARE" pool for STAR to cancel service n/a   Is this patient calling to reschedule an infusion appointment? no   When is their next infusion appointment? n/a   Is this patient a Chemo patient? no   Reason for Cancellation or Reschedule Patient unable to make her appointment, patient requested her mammogram order  So she can complete the imaging and have the result available for her Follow up  If the patient is a treatment patient, please route this to the office nurse  If the patient is not on treatment, please route to the office MA  If the patient is a surgical oncology patient, please route to surg/onc clinical pool

## 2022-11-04 DIAGNOSIS — C50.411 MALIGNANT NEOPLASM OF UPPER-OUTER QUADRANT OF RIGHT BREAST IN FEMALE, ESTROGEN RECEPTOR POSITIVE (HCC): Primary | ICD-10-CM

## 2022-11-04 DIAGNOSIS — Z17.0 MALIGNANT NEOPLASM OF UPPER-OUTER QUADRANT OF RIGHT BREAST IN FEMALE, ESTROGEN RECEPTOR POSITIVE (HCC): Primary | ICD-10-CM

## 2022-11-04 NOTE — TELEPHONE ENCOUNTER
Called and spoke with patient  Scheduled mammogram for 2/20/23 at 2:15pm at [facility]  Rescheduled follow up to 2/27/23 at 3:30pm with Dr Green in [facility]

## 2023-02-01 ENCOUNTER — OFFICE VISIT (OUTPATIENT)
Dept: FAMILY MEDICINE CLINIC | Facility: CLINIC | Age: 58
End: 2023-02-01

## 2023-02-01 ENCOUNTER — HOSPITAL ENCOUNTER (OUTPATIENT)
Dept: CT IMAGING | Facility: HOSPITAL | Age: 58
Discharge: HOME | End: 2023-02-01

## 2023-02-01 VITALS
HEART RATE: 62 BPM | BODY MASS INDEX: 28.77 KG/M2 | SYSTOLIC BLOOD PRESSURE: 112 MMHG | WEIGHT: 179 LBS | OXYGEN SATURATION: 98 % | HEIGHT: 66 IN | DIASTOLIC BLOOD PRESSURE: 68 MMHG

## 2023-02-01 DIAGNOSIS — Z78.0 POSTMENOPAUSAL: ICD-10-CM

## 2023-02-01 DIAGNOSIS — R10.32 LLQ PAIN: ICD-10-CM

## 2023-02-01 DIAGNOSIS — Z17.0 MALIGNANT NEOPLASM OF UPPER-OUTER QUADRANT OF RIGHT BREAST IN FEMALE, ESTROGEN RECEPTOR POSITIVE (HCC): ICD-10-CM

## 2023-02-01 DIAGNOSIS — C50.411 MALIGNANT NEOPLASM OF UPPER-OUTER QUADRANT OF RIGHT BREAST IN FEMALE, ESTROGEN RECEPTOR POSITIVE (HCC): ICD-10-CM

## 2023-02-01 DIAGNOSIS — R39.15 URINARY URGENCY: Primary | ICD-10-CM

## 2023-02-01 LAB
SL AMB  POCT GLUCOSE, UA: NEGATIVE
SL AMB LEUKOCYTE ESTERASE,UA: NEGATIVE
SL AMB POCT BILIRUBIN,UA: NEGATIVE
SL AMB POCT BLOOD,UA: NEGATIVE
SL AMB POCT CLARITY,UA: CLEAR
SL AMB POCT COLOR,UA: YELLOW
SL AMB POCT KETONES,UA: NEGATIVE
SL AMB POCT NITRITE,UA: NEGATIVE
SL AMB POCT PH,UA: 7
SL AMB POCT SPECIFIC GRAVITY,UA: 1.01
SL AMB POCT URINE PROTEIN: NEGATIVE
SL AMB POCT UROBILINOGEN: 0.2

## 2023-02-01 RX ORDER — CALCIUM CARBONATE 300MG(750)
TABLET,CHEWABLE ORAL DAILY
COMMUNITY

## 2023-02-01 RX ADMIN — IOHEXOL 100 ML: 350 INJECTION, SOLUTION INTRAVENOUS at 15:56

## 2023-02-01 RX ADMIN — IOHEXOL 35 ML: 300 INJECTION, SOLUTION INTRAVENOUS at 15:56

## 2023-02-01 NOTE — PATIENT INSTRUCTIONS
Await  CT scan, rec B6 200 mg with magnesium for hands  Weight Management   AMBULATORY CARE:   Why it is important to manage your weight:  Being overweight increases your risk of health conditions such as heart disease, high blood pressure, type 2 diabetes, and certain types of cancer  It can also increase your risk for osteoarthritis, sleep apnea, and other respiratory problems  Aim for a slow, steady weight loss  Even a small amount of weight loss can lower your risk of health problems  Risks of being overweight:  Extra weight can cause many health problems, including the following:  Diabetes (high blood sugar level)    High blood pressure or high cholesterol    Heart disease    Stroke    Gallbladder or liver disease    Cancer of the colon, breast, prostate, liver, or kidney    Sleep apnea    Arthritis or gout    Screening  is done to check for health conditions before you have signs or symptoms  If you are 28to 79years old, your blood sugar level may be checked every 3 years for signs of prediabetes or diabetes  Your healthcare provider will check your blood pressure at each visit  High blood pressure can lead to a stroke or other problems  Your provider may check for signs of heart disease, cancer, or other health problems  How to lose weight safely:  A safe and healthy way to lose weight is to eat fewer calories and get regular exercise  You can lose up about 1 pound a week by decreasing the number of calories you eat by 500 calories each day  You can decrease calories by eating smaller portion sizes or by cutting out high-calorie foods  Read labels to find out how many calories are in the foods you eat  You can also burn calories with exercise such as walking, swimming, or biking  You will be more likely to keep weight off if you make these changes part of your lifestyle  Exercise at least 30 minutes per day on most days of the week   You can also fit in more physical activity by taking the stairs instead of the elevator or parking farther away from stores  Ask your healthcare provider about the best exercise plan for you  Healthy meal plan for weight management:  A healthy meal plan includes a variety of foods, contains fewer calories, and helps you stay healthy  A healthy meal plan includes the following:     Eat whole-grain foods more often  A healthy meal plan should contain fiber  Fiber is the part of grains, fruits, and vegetables that is not broken down by your body  Whole-grain foods are healthy and provide extra fiber in your diet  Some examples of whole-grain foods are whole-wheat breads and pastas, oatmeal, brown rice, and bulgur  Eat a variety of vegetables every day  Include dark, leafy greens such as spinach, kale, cb greens, and mustard greens  Eat yellow and orange vegetables such as carrots, sweet potatoes, and winter squash  Eat a variety of fruits every day  Choose fresh or canned fruit (canned in its own juice or light syrup) instead of juice  Fruit juice has very little or no fiber  Eat low-fat dairy foods  Drink fat-free (skim) milk or 1% milk  Eat fat-free yogurt and low-fat cottage cheese  Try low-fat cheeses such as mozzarella and other reduced-fat cheeses  Choose meat and other protein foods that are low in fat  Choose beans or other legumes such as split peas or lentils  Choose fish, skinless poultry (chicken or turkey), or lean cuts of red meat (beef or pork)  Before you cook meat or poultry, cut off any visible fat  Use less fat and oil  Try baking foods instead of frying them  Add less fat, such as margarine, sour cream, regular salad dressing and mayonnaise to foods  Eat fewer high-fat foods  Some examples of high-fat foods include french fries, doughnuts, ice cream, and cakes  Eat fewer sweets  Limit foods and drinks that are high in sugar  This includes candy, cookies, regular soda, and sweetened drinks      Ways to decrease calories:   Eat smaller portions  Use a small plate with smaller servings  Do not eat second helpings  When you eat at a restaurant, ask for a box and place half of your meal in the box before you eat  Share an entrée with someone else  Replace high-calorie snacks with healthy, low-calorie snacks  Choose fresh fruit, vegetables, fat-free rice cakes, or air-popped popcorn instead of potato chips, nuts, or chocolate  Choose water or calorie-free drinks instead of soda or sweetened drinks  Do not shop for groceries when you are hungry  You may be more likely to make unhealthy food choices  Take a grocery list of healthy foods and shop after you have eaten  Eat regular meals  Do not skip meals  Skipping meals can lead to overeating later in the day  This can make it harder for you to lose weight  Eat a healthy snack in place of a meal if you do not have time to eat a regular meal  Talk with a dietitian to help you create a meal plan and schedule that is right for you  Other things to consider as you try to lose weight:   Be aware of situations that may give you the urge to overeat, such as eating while watching television  Find ways to avoid these situations  For example, read a book, go for a walk, or do crafts  Meet with a weight loss support group or friends who are also trying to lose weight  This may help you stay motivated to continue working on your weight loss goals  © Copyright [employer] 2022 Information is for End User's use only and may not be sold, redistributed or otherwise used for commercial purposes  All illustrations and images included in CareNotes® are the copyrighted property of A D A M [employer] Inc  or [facility]   The above information is an  only  It is not intended as medical advice for individual conditions or treatments  Talk to your doctor, nurse or pharmacist before following any medical regimen to see if it is safe and effective for you

## 2023-02-01 NOTE — PROGRESS NOTES
Name: Rachael Muñoz      : 1965      MRN: [id number]  Encounter Provider: Sirena Sepulveda MD  Encounter Date: 2023   Encounter department: [facility] PRIMARY CARE    Assessment & Plan     1  Urinary urgency  -     POCT urine dip    2  Malignant neoplasm of upper-outer quadrant of right breast in female, estrogen receptor positive ([address] )  Assessment & Plan:  Sees hem-onc and  surgery      3  BMI 28 0-28 9,adult    4  LLQ pain  -     CT abdomen pelvis w contrast; Future; Expected date: 2023    5  Postmenopausal  -     DXA bone density spine hip and pelvis; Future; Expected date: 2023         Subjective      abd pain on and off since , urinary sx  On and off, no documented  uti , had us  2014  That  Didn't show  Much, pins and  Needles sensation at  Night  In both hands    Review of Systems   Constitutional: Negative for activity change, appetite change and fatigue  Respiratory: Negative for cough  Cardiovascular: Negative for chest pain  Gastrointestinal: Positive for abdominal pain  Genitourinary: Positive for dysuria  Neurological: Positive for numbness  Hands       Current Outpatient Medications on File Prior to Visit   Medication Sig   • anastrozole (ARIMIDEX) 1 mg tablet Take 1 tablet (1 mg total) by mouth daily   • Calcium Carb-Cholecalciferol (CALCIUM+D3 PO) Take 1 tablet by mouth daily    • levothyroxine 88 mcg tablet Take 1 tablet by mouth daily   • Magnesium 400 MG TABS Take by mouth daily   • Multiple Vitamin (MULTIVITAMIN) tablet Take 1 tablet by mouth daily       Objective     /68 (BP Location: Right arm, Patient Position: Sitting, Cuff Size: Large)   Pulse 62   Ht 5' 6" (1 676 m)   Wt 81 2 kg (179 lb)   LMP  (LMP Unknown)   SpO2 98%   BMI 28 89 kg/m²     Physical Exam  Vitals reviewed  Constitutional:       Appearance: She is well-developed  Cardiovascular:      Rate and Rhythm: Normal rate and regular rhythm  Pulses: Normal pulses  Heart sounds: Normal heart sounds  Pulmonary:      Effort: Pulmonary effort is normal       Breath sounds: Normal breath sounds  Abdominal:      Tenderness: There is abdominal tenderness in the left lower quadrant  There is no right CVA tenderness, left CVA tenderness, guarding or rebound  Musculoskeletal:      Right lower leg: No edema  Left lower leg: No edema  Lymphadenopathy:      Cervical: No cervical adenopathy  Neurological:      Mental Status: She is alert  Aamir Smith MD  BMI Counseling: Body mass index is 28 89 kg/m²  The BMI is above normal  Nutrition recommendations include reducing portion sizes, decreasing overall calorie intake, 3-5 servings of fruits/vegetables daily, reducing fast food intake and consuming healthier snacks

## 2023-02-02 DIAGNOSIS — R10.32 LLQ PAIN: Primary | ICD-10-CM

## 2023-02-20 ENCOUNTER — HOSPITAL ENCOUNTER (OUTPATIENT)
Dept: MAMMOGRAPHY | Facility: CLINIC | Age: 58
Discharge: HOME | End: 2023-02-20

## 2023-02-20 DIAGNOSIS — C50.411 MALIGNANT NEOPLASM OF UPPER-OUTER QUADRANT OF RIGHT BREAST IN FEMALE, ESTROGEN RECEPTOR POSITIVE (HCC): ICD-10-CM

## 2023-02-20 DIAGNOSIS — Z17.0 MALIGNANT NEOPLASM OF UPPER-OUTER QUADRANT OF RIGHT BREAST IN FEMALE, ESTROGEN RECEPTOR POSITIVE (HCC): ICD-10-CM

## 2023-02-27 ENCOUNTER — OFFICE VISIT (OUTPATIENT)
Dept: SURGICAL ONCOLOGY | Facility: CLINIC | Age: 58
End: 2023-02-27

## 2023-02-27 VITALS
OXYGEN SATURATION: 97 % | HEIGHT: 66 IN | HEART RATE: 65 BPM | WEIGHT: 180.6 LBS | DIASTOLIC BLOOD PRESSURE: 62 MMHG | RESPIRATION RATE: 16 BRPM | BODY MASS INDEX: 29.02 KG/M2 | TEMPERATURE: 99.1 F | SYSTOLIC BLOOD PRESSURE: 106 MMHG

## 2023-02-27 DIAGNOSIS — R92.2 DENSE BREAST TISSUE: ICD-10-CM

## 2023-02-27 DIAGNOSIS — Z12.31 SCREENING MAMMOGRAM FOR BREAST CANCER: ICD-10-CM

## 2023-02-27 DIAGNOSIS — C50.411 MALIGNANT NEOPLASM OF UPPER-OUTER QUADRANT OF RIGHT BREAST IN FEMALE, ESTROGEN RECEPTOR POSITIVE (HCC): Primary | ICD-10-CM

## 2023-02-27 DIAGNOSIS — Z17.0 MALIGNANT NEOPLASM OF UPPER-OUTER QUADRANT OF RIGHT BREAST IN FEMALE, ESTROGEN RECEPTOR POSITIVE (HCC): Primary | ICD-10-CM

## 2023-02-27 DIAGNOSIS — Z79.811 USE OF ANASTROZOLE: ICD-10-CM

## 2023-02-27 NOTE — PROGRESS NOTES
Surgical Oncology Follow Up       [facility] SURGICAL ONCOLOGY [facility] [address] [address]    Rachael Muñoz  1965  [id number]  [facility] SURGICAL ONCOLOGY [place]  [place] [id number]    Chief Complaint   Patient presents with   • Follow-up       Assessment/Plan   Diagnoses and all orders for this visit:    Malignant neoplasm of upper-outer quadrant of right breast in female, estrogen receptor positive ([facility] [address] )    Screening mammogram for breast cancer  -     Mammo screening bilateral w 3d & cad; Future    Dense breast tissue    Use of anastrozole        Advance Care Planning/Advance Directives:  Discussed disease status, cancer treatment plans and/or cancer treatment goals with the patient  Oncology History:    Oncology History   Malignant neoplasm of upper-outer quadrant of right breast in female, estrogen receptor positive ([facility] [address] )   3/1/2018 Initial Diagnosis    Malignant neoplasm of upper-outer quadrant of right breast in female, estrogen receptor positive ([facility] [address] )     3/1/2018 Biopsy    RIGHT breast biopsy:  ER/WY negative, HER2 by IHC 3+ positive  Microinvasive carcinoma arising in extensive high-grade ductal carcinoma in-situ (DCIS), grade        4/24/2018 Surgery    LUMPECTOMY BREAST NEEDLE LOCALIZED BRACKET (Right)  BIOPSY LYMPH NODE SENTINEL (Right) - Dr Flako Vázquez     4/24/2018 -  Cancer Staged    Stage IA - pT1a, pN0(sn), cM0, G2, ER 90-95% positive, WY <1% negative, HER2 by IHC 2+ equivocal; FISH pending  4 mm IDC is largest focus-2 additional 0 8mm foci and extensive DCIS  No LVI  Extensive LCIS  Margins negative for invasive and in situ  Lymph node assessment/status 0/3        5/2018 -  Hormone Therapy    Anastrozole 1 mg daily    - Dr Ashleigh Dupree     6/19/2018 - 7/18/2018 Radiation    Right breast to 4256 cGy in 16 fractions followed by a boost to the lumpectomy cavity of an additional 1000 cGy  - Dr Avila Shook         History of Present Illness: Breast cancer follow-up, no breast referable concerns, continues on anastrozole  -Interval History: Recent mammogram    Review of Systems:  Review of Systems   Constitutional: Negative  Negative for appetite change and fever  Eyes: Negative  Respiratory: Negative for shortness of breath  Cardiovascular: Negative  Gastrointestinal: Negative  Endocrine: Negative  Genitourinary: Negative  Musculoskeletal: Negative  Negative for arthralgias and myalgias  Skin: Negative  Allergic/Immunologic: Negative  Neurological: Negative  Hematological: Negative  Negative for adenopathy  Does not bruise/bleed easily  Psychiatric/Behavioral: Negative          Patient Active Problem List   Diagnosis   • Malignant neoplasm of upper-outer quadrant of right breast in female, estrogen receptor positive ([address] )   • Dense breast tissue   • Use of anastrozole   • Pelvic pain   • Basal cell carcinoma (BCC) of skin of nose   • Acquired hypothyroidism   • HEIKE (obstructive sleep apnea)   • Overweight   • Screening mammogram for breast cancer     Past Medical History:   Diagnosis Date   • Anxiety    • Basal cell carcinoma (BCC) 2019    nose   • Bilateral breast cysts    • Depression    • Frequent urination    • Hashimoto's disease    • Hypothyroidism    • Night sweats    • Ovarian cyst    • Palpitations     improved since lowered caffeine intake   • Tinnitus     b/l   • Viral syndrome      Past Surgical History:   Procedure Laterality Date   • BREAST BIOPSY Right 03 01/18    DCIS   • BREAST BIOPSY Left 04/2018    benign   • BREAST LUMPECTOMY Right 04/24/2018    Procedure: LUMPECTOMY BREAST NEEDLE LOCALIZED BRACKET;  Surgeon: Sully Castle MD;  Location: [facility];  Service: Surgical Oncology   • COLONOSCOPY     • ESOPHAGOGASTRODUODENOSCOPY     • LYMPH NODE BIOPSY Right 04/24/2018    Procedure: BIOPSY LYMPH NODE SENTINEL;  Surgeon: Sully Castle MD;  Location: [facility];  Service: Surgical Oncology   • MAMMO NEEDLE LOCALIZATION RIGHT (ALL INC) Right 04/24/2018   • MAMMO NEEDLE LOCALIZATION RIGHT (ALL INC) EACH ADD Right 04/24/2018   • MAMMO STEREOTACTIC BREAST BIOPSY RIGHT (ALL INC) Right 03/01/2018   • MRI BREAST BIOPSY LEFT (ALL INCLUSIVE) Left 04/16/2018   • WISDOM TOOTH EXTRACTION       Family History   Problem Relation Age of Onset   • Prostate cancer Paternal Uncle 64   • Anemia Mother    • Kidney disease Mother    • Dementia Father    • Heart disease Father    • Diabetes Father    • Glaucoma Father    • Hypothyroidism Father    • No Known Problems Sister    • No Known Problems Maternal Grandmother    • No Known Problems Maternal Grandfather    • No Known Problems Paternal Grandmother    • No Known Problems Paternal Grandfather    • No Known Problems Maternal Aunt    • No Known Problems Paternal Aunt    • No Known Problems Paternal Aunt    • Hypothyroidism Brother      Social History     Socioeconomic History   • Marital status: /Civil Union     Spouse name: Not on file   • Number of children: Not on file   • Years of education: Not on file   • Highest education level: Not on file   Occupational History   • Not on file   Tobacco Use   • Smoking status: Never   • Smokeless tobacco: Never   Vaping Use   • Vaping Use: Never used   Substance and Sexual Activity   • Alcohol use:  Yes     Alcohol/week: 7 0 standard drinks     Types: 7 Standard drinks or equivalent per week     Comment: 7 -8 drinks a week   • Drug use: No   • Sexual activity: Not on file   Other Topics Concern   • Not on file   Social History Narrative   • Not on file     Social Determinants of Health     Financial Resource Strain: Not on file   Food Insecurity: Not on file   Transportation Needs: Not on file   Physical Activity: Not on file   Stress: Not on file   Social Connections: Not on file   Intimate Partner Violence: Not on file   Housing Stability: Not on file       Current Outpatient Medications:   •  anastrozole (ARIMIDEX) 1 mg tablet, Take 1 tablet (1 mg total) by mouth daily, Disp: 90 tablet, Rfl: 3  •  Calcium Carb-Cholecalciferol (CALCIUM+D3 PO), Take 1 tablet by mouth daily , Disp: , Rfl:   •  levothyroxine 88 mcg tablet, Take 1 tablet by mouth daily, Disp: , Rfl:   •  Magnesium 400 MG TABS, Take by mouth daily, Disp: , Rfl:   •  Multiple Vitamin (MULTIVITAMIN) tablet, Take 1 tablet by mouth daily, Disp: , Rfl:   Allergies   Allergen Reactions   • Clindamycin Other (See Comments)     Was painful to walk    • Erythromycin Hives   • Sulfamethoxazole-Trimethoprim Hives   • Amoxicillin Rash       The following portions of the patient's history were reviewed and updated as appropriate: allergies, current medications, past family history, past medical history, past social history, past surgical history and problem list         Vitals:    02/27/23 1527   BP: 106/62   Pulse: 65   Resp: 16   Temp: 99 1 °F (37 3 °C)   SpO2: 97%       Physical Exam  Constitutional:       General: She is not in acute distress  Appearance: Normal appearance  She is well-developed  HENT:      Head: Normocephalic and atraumatic  Cardiovascular:      Heart sounds: Normal heart sounds  Pulmonary:      Breath sounds: Normal breath sounds  Chest:   Breasts:     Right: Skin change ( Lumpectomy scar) present  No inverted nipple, mass, nipple discharge or tenderness  Left: No inverted nipple, mass, nipple discharge, skin change or tenderness  Abdominal:      Palpations: Abdomen is soft  Lymphadenopathy:      Upper Body:      Right upper body: No supraclavicular, axillary or pectoral adenopathy  Left upper body: No supraclavicular, axillary or pectoral adenopathy  Neurological:      Mental Status: She is alert and oriented to person, place, and time     Psychiatric:         Mood and Affect: Mood normal            Results:  Labs:      Imaging  2/20/2023 bilateral 3D diagnostic mammogram was benign BI-RADS 2 with a density of 3    I reviewed the above imaging data  Discussion/Summary: 61-year-old female status post right breast conservation for stage Ia invasive duct carcinoma  She had radiation therapy and continues on anastrozole  There is no evidence of disease based on exam today  She is now 5 years out of her diagnosis and surgery  I will make arrangements for a 3D screening mammogram for next year  She will also be seen in 1 year in our survivorship program or sooner should the need arise

## 2023-03-08 PROBLEM — Z12.11 COLON CANCER SCREENING: Status: ACTIVE | Noted: 2023-03-08

## 2023-04-24 DIAGNOSIS — C50.411 MALIGNANT NEOPLASM OF UPPER-OUTER QUADRANT OF RIGHT BREAST IN FEMALE, ESTROGEN RECEPTOR POSITIVE (HCC): ICD-10-CM

## 2023-04-24 DIAGNOSIS — Z17.0 MALIGNANT NEOPLASM OF UPPER-OUTER QUADRANT OF RIGHT BREAST IN FEMALE, ESTROGEN RECEPTOR POSITIVE (HCC): ICD-10-CM

## 2023-04-24 RX ORDER — ANASTROZOLE 1 MG/1
TABLET ORAL
Qty: 90 TABLET | Refills: 3 | Status: SHIPPED | OUTPATIENT
Start: 2023-04-24 | End: 2023-05-05 | Stop reason: ALTCHOICE

## 2023-05-05 ENCOUNTER — OFFICE VISIT (OUTPATIENT)
Dept: HEMATOLOGY ONCOLOGY | Facility: CLINIC | Age: 58
End: 2023-05-05

## 2023-05-05 VITALS
SYSTOLIC BLOOD PRESSURE: 124 MMHG | BODY MASS INDEX: 29.33 KG/M2 | DIASTOLIC BLOOD PRESSURE: 68 MMHG | HEART RATE: 68 BPM | RESPIRATION RATE: 18 BRPM | WEIGHT: 182.5 LBS | OXYGEN SATURATION: 98 % | HEIGHT: 66 IN | TEMPERATURE: 97 F

## 2023-05-05 DIAGNOSIS — C50.411 MALIGNANT NEOPLASM OF UPPER-OUTER QUADRANT OF RIGHT BREAST IN FEMALE, ESTROGEN RECEPTOR POSITIVE (HCC): Primary | ICD-10-CM

## 2023-05-05 DIAGNOSIS — Z17.0 MALIGNANT NEOPLASM OF UPPER-OUTER QUADRANT OF RIGHT BREAST IN FEMALE, ESTROGEN RECEPTOR POSITIVE (HCC): Primary | ICD-10-CM

## 2023-05-05 NOTE — PROGRESS NOTES
Hematology / Oncology Outpatient Follow Up Note    Christen Childers 59 y o  female :1965 BMK:[id number]         Date:  2023    Assessment / Plan:  A 47-year-old postmenopausal woman with stage IA right breast cancer, grade 2, ER 90% positive, HI negative, HER2 equivocal disease   Interestingly enough, initial biopsy which showed micro invasion with ER negative, HI negative HER2 3+ disease   She underwent lumpectomy with sentinel lymph node biopsy, resulting in FILEMON   Her tumor size was 4 mm    She is currently on adjuvant hormonal therapy with anastrozole with no significant toxicity  She has no evidence of recurrent disease  This is nearly 5 years on anastrozole  Therefore, I recommended her to discontinue anastrozole in 2023 to complete 5 years of adjuvant hormonal therapy  She is in agreement with my recommendation  I recommended her to be followed by breast cancer survivorship clinic annually               Subjective:      HPI:  A 47-year-old postmenopausal woman who had last menstrual cycle at age of 44 was found to have mammographic abnormality in her right breast   Therefore, she underwent right breast biopsy in 2018 which showed micro invasive carcinoma with extensive DCIS   Micro invasive carcinoma was ER negative, HI negative, HER2 3+ disease   Subsequently, she underwent lumpectomy with sentinel lymph node biopsy by Dr Austin Miranda in 2018   She had 4 x 3 mm of invasive ductal carcinoma, grade 2   Lymphovascular invasion was not seen   One sentinel lymph node was negative for metastatic disease   This was ER 90% positive, HI negative, HER2 2+ disease    HER2 fish was equivocal with ratio of 1 4   She presents today to discuss adjuvant treatment options   She has no complaint of pain   However, since last week, she has noticed some erythematous skin change at the lumpectomy site  April Im is no fever, chills or night sweats   She denied any respiratory symptoms   She only has hypothyroidism as past medical history   She has no history of major surgery   She denied family history of breast or ovarian cancer   Her performance status is normal            Interval History:  A 38-year-old postmenopausal woman with stage IA right breast cancer, grade 2, ER 90% positive, IN negative, HER2 equivocal disease   Interestingly enough, initial biopsy which showed micro invasion was ER negative, IN negative HER2 3+ disease   She underwent lumpectomy with sentinel lymph node biopsy, resulting in FILEMON   Her tumor size was 4 mm    We had extensive discussion regarding trastuzumab, possible adjuvant chemotherapy as well as anastrozole monotherapy   She chose to be on anastrozole monotherapy, which she has been on since August 2018  She presents today for routine follow-up  She feels well with no new complaints  She has no hot flashes or musculoskeletal symptoms  She is tolerating anastrozole very well  She denies pain  She has no respiratory symptoms    Her performance status is normal       Objective:      Primary Diagnosis:     1   Right breast cancer, stage IA  (pT1a, pN0, M0) grade 2, ER 90% positive, IN negative, HER2 fish equivocal  (initial biopsy report micro invasive cancer, ER negative, IN negative, HER2 3+ disease )  Diagnosed in April 2018      Cancer Staging:  Cancer Staging   Malignant neoplasm of upper-outer quadrant of right breast in female, estrogen receptor positive ([facility] [address] )  Staging form: Breast, AJCC 8th Edition  - Clinical: cT1mi, cN0, cM0 - Signed by Orquidea Simmons MD on 3/14/2018  - Pathologic: Stage IA (pT1a, pN0(sn), cM0, G2, ER: Positive, IN: Negative, HER2: Equivocal) - Signed by Orquidea Simmons MD on 5/8/2018           Previous Hematologic/ Oncologic Treatment:      Adjuvant hormonal therapy with anastrozole from August 2018 through July 2023      Current Hematologic/ Oncologic Treatment:       Observation      Disease Status:      FILEMON status post lumpectomy with sentinel lymph node biopsy      Test Results:     Pathology:     Initial biopsy showed micro invasive cancer with extensive DCIS   Micro invasive part was ER negative, AR negative, her 2 3+ disease        Lumpectomy showed 4 x 3 mm of invasive ductal carcinoma, grade 2   No evidence of lymphovascular invasion   1 sentinel lymph node was negative for metastatic disease   This was ER 90 -95 % positive, AR negative, HER2  2+ disease  Emmanuel Blake fish was equivocal with ratio of 1 4   Stage IA (pT1a, pN0, M0)     Radiology:     Mammography in February 2023 was benign   BI-RADS 2      Laboratory:     CBC and CMP are within normal limits        Physical Exam:        General Appearance:    Alert, oriented          Eyes:    PERRL   Ears:    Normal external ear canals, both ears   Nose:   Nares normal, septum midline   Throat:   Mucosa moist  Pharynx without injection  Neck:   Supple         Lungs:     Clear to auscultation bilaterally   Chest Wall:    No tenderness or deformity    Heart:    Regular rate and rhythm         Abdomen:     Soft, non-tender, bowel sounds +, no organomegaly               Extremities:   Extremities no cyanosis or edema         Skin:   no rash or icterus  Lymph nodes:   Cervical, supraclavicular, and axillary nodes normal   Neurologic:   CNII-XII intact, normal strength, sensation and reflexes     Throughout             Breast exam:   Lumpectomy scar at lateral aspect of her right breast with minor erythematous skin change   No palpable abnormality   Left breast exam is negative  ROS: Review of Systems   All other systems reviewed and are negative  Imaging: No results found        Labs:   Lab Results   Component Value Date    WBC 6 2 10/20/2021    HGB 13 8 10/20/2021    HCT 41 7 10/20/2021    MCV 90 3 10/20/2021     10/20/2021     Lab Results   Component Value Date    K 4 1 10/20/2021     10/20/2021    CO2 30 10/20/2021    BUN 16 10/20/2021    CREATININE 0 77 10/20/2021    GLUF 93 10/25/2019    CALCIUM 9 4 10/20/2021    AST 28 10/20/2021    ALT 29 10/20/2021    ALKPHOS 85 10/20/2021    EGFR 89 04/12/2018           Current Medications: Reviewed  Allergies: Reviewed  PMH/FH/SH:  Reviewed      Vital Sign:    Body surface area is 1 92 meters squared      Wt Readings from Last 3 Encounters:   05/05/23 82 8 kg (182 lb 8 oz)   03/08/23 81 6 kg (180 lb)   02/27/23 81 9 kg (180 lb 9 6 oz)        Temp Readings from Last 3 Encounters:   05/05/23 (!) 97 °F (36 1 °C) (Tympanic)   03/08/23 98 4 °F (36 9 °C) (Temporal)   02/27/23 99 1 °F (37 3 °C) (Tympanic)        BP Readings from Last 3 Encounters:   05/05/23 124/68   03/08/23 93/67   02/27/23 106/62         Pulse Readings from Last 3 Encounters:   05/05/23 68   03/08/23 66   02/27/23 65     @LASTSAO2(3)@

## 2023-05-07 PROBLEM — Z12.11 COLON CANCER SCREENING: Status: RESOLVED | Noted: 2023-03-08 | Resolved: 2023-05-07

## 2023-06-29 ENCOUNTER — TELEPHONE (OUTPATIENT)
Dept: OTHER | Facility: OTHER | Age: 58
End: 2023-06-29

## 2023-06-29 NOTE — TELEPHONE ENCOUNTER
Patient requesting a refill of her anastrozole 1 mg  She would like 30 days  sent to [facility] on [address] in [facility]  Patient would like to have one more month to make it to her 5 year mey   Medication is not on active med list

## 2023-06-30 DIAGNOSIS — C50.411 MALIGNANT NEOPLASM OF UPPER-OUTER QUADRANT OF RIGHT BREAST IN FEMALE, ESTROGEN RECEPTOR POSITIVE (HCC): Primary | ICD-10-CM

## 2023-06-30 DIAGNOSIS — Z17.0 MALIGNANT NEOPLASM OF UPPER-OUTER QUADRANT OF RIGHT BREAST IN FEMALE, ESTROGEN RECEPTOR POSITIVE (HCC): Primary | ICD-10-CM

## 2023-06-30 RX ORDER — ANASTROZOLE 1 MG/1
1 TABLET ORAL DAILY
Qty: 30 TABLET | Refills: 0 | Status: SHIPPED | OUTPATIENT
Start: 2023-06-30

## 2023-07-22 DIAGNOSIS — C50.411 MALIGNANT NEOPLASM OF UPPER-OUTER QUADRANT OF RIGHT BREAST IN FEMALE, ESTROGEN RECEPTOR POSITIVE (HCC): ICD-10-CM

## 2023-07-22 DIAGNOSIS — Z17.0 MALIGNANT NEOPLASM OF UPPER-OUTER QUADRANT OF RIGHT BREAST IN FEMALE, ESTROGEN RECEPTOR POSITIVE (HCC): ICD-10-CM

## 2023-07-22 RX ORDER — ANASTROZOLE 1 MG/1
1 TABLET ORAL DAILY
Qty: 90 TABLET | Refills: 1 | Status: SHIPPED | OUTPATIENT
Start: 2023-07-22

## 2023-09-01 ENCOUNTER — HOSPITAL ENCOUNTER (OUTPATIENT)
Dept: BONE DENSITY | Facility: MEDICAL CENTER | Age: 58
Discharge: HOME | End: 2023-09-01
Payer: COMMERCIAL

## 2023-09-01 DIAGNOSIS — Z78.0 POSTMENOPAUSAL: ICD-10-CM

## 2023-09-01 PROCEDURE — 77080 DXA BONE DENSITY AXIAL: CPT

## 2023-09-13 ENCOUNTER — OFFICE VISIT (OUTPATIENT)
Dept: FAMILY MEDICINE CLINIC | Facility: CLINIC | Age: 58
End: 2023-09-13
Payer: COMMERCIAL

## 2023-09-13 VITALS
OXYGEN SATURATION: 97 % | DIASTOLIC BLOOD PRESSURE: 66 MMHG | BODY MASS INDEX: 29.06 KG/M2 | WEIGHT: 180.8 LBS | SYSTOLIC BLOOD PRESSURE: 104 MMHG | HEIGHT: 66 IN | HEART RATE: 67 BPM

## 2023-09-13 DIAGNOSIS — G47.33 OSA (OBSTRUCTIVE SLEEP APNEA): ICD-10-CM

## 2023-09-13 DIAGNOSIS — E03.9 ACQUIRED HYPOTHYROIDISM: ICD-10-CM

## 2023-09-13 DIAGNOSIS — Z11.1 SCREENING FOR TUBERCULOSIS: ICD-10-CM

## 2023-09-13 DIAGNOSIS — Z00.00 ANNUAL PHYSICAL EXAM: Primary | ICD-10-CM

## 2023-09-13 DIAGNOSIS — Z13.220 SCREENING FOR LIPID DISORDERS: ICD-10-CM

## 2023-09-13 DIAGNOSIS — Z17.0 MALIGNANT NEOPLASM OF UPPER-OUTER QUADRANT OF RIGHT BREAST IN FEMALE, ESTROGEN RECEPTOR POSITIVE: ICD-10-CM

## 2023-09-13 DIAGNOSIS — Z13.1 SCREENING FOR DIABETES MELLITUS: ICD-10-CM

## 2023-09-13 DIAGNOSIS — C50.411 MALIGNANT NEOPLASM OF UPPER-OUTER QUADRANT OF RIGHT BREAST IN FEMALE, ESTROGEN RECEPTOR POSITIVE: ICD-10-CM

## 2023-09-13 PROCEDURE — 99214 OFFICE O/P EST MOD 30 MIN: CPT | Performed by: NURSE PRACTITIONER

## 2023-09-13 PROCEDURE — 99396 PREV VISIT EST AGE 40-64: CPT | Performed by: NURSE PRACTITIONER

## 2023-09-13 PROCEDURE — 86580 TB INTRADERMAL TEST: CPT | Performed by: NURSE PRACTITIONER

## 2023-09-13 RX ORDER — CLOBETASOL PROPIONATE 0.5 MG/G
OINTMENT TOPICAL
COMMUNITY
Start: 2023-08-18 | End: 2023-09-13

## 2023-09-13 RX ORDER — CLINDAMYCIN PHOSPHATE 10 MG/G
GEL TOPICAL
COMMUNITY
Start: 2023-08-18

## 2023-09-13 RX ORDER — BENZOYL PEROXIDE 50 MG/ML
LIQUID TOPICAL
COMMUNITY
Start: 2023-08-18

## 2023-09-13 NOTE — PROGRESS NOTES
[facility] PRIMARY CARE    NAME: Elle Calzada  AGE: 59 y.o. SEX: female  : 1965     DATE: 2023     Assessment and Plan:     Problem List Items Addressed This Visit        Endocrine    Acquired hypothyroidism     Well-controlled on current regimen. Patient does have regular follow-up with endocrinology regarding this. Respiratory    HEIKE (obstructive sleep apnea)     Well-controlled with at bedtime use of CPAP. Other    Malignant neoplasm of upper-outer quadrant of right breast in female, estrogen receptor positive ([address])     Patient continues to follow with hematology/oncology for this. Other Visit Diagnoses     Annual physical exam    -  Primary    Screening for tuberculosis        Relevant Orders    TB Skin Test (Completed)    Screening for diabetes mellitus        Relevant Orders    Comprehensive metabolic panel    UA w Reflex to Microscopic w Reflex to Culture -Lab Collect    Screening for lipid disorders        Relevant Orders    Lipid Panel with Direct LDL reflex          Immunizations and preventive care screenings were discussed with patient today. Appropriate education was printed on patient's after visit summary. Counseling:  Alcohol/drug use: discussed moderation in alcohol intake, the recommendations for healthy alcohol use, and avoidance of illicit drug use. Dental Health: discussed importance of regular tooth brushing, flossing, and dental visits. Injury prevention: discussed safety/seat belts, safety helmets, smoke detectors, carbon dioxide detectors, and smoking near bedding or upholstery. Sexual health: discussed sexually transmitted diseases, partner selection, use of condoms, avoidance of unintended pregnancy, and contraceptive alternatives. Exercise: the importance of regular exercise/physical activity was discussed. Recommend exercise 3-5 times per week for at least 30 minutes. Depression Screening and Follow-up Plan: Patient was screened for depression during today's encounter. They screened negative with a PHQ-2 score of 0. Return in about 6 months (around 3/13/2024) for Recheck. Chief Complaint:     Chief Complaint   Patient presents with   • Physical Exam     Patient present today for her Annual Physical Exam and to fill out fitness form for work. History of Present Illness:     Adult Annual Physical   Patient here for a comprehensive physical exam. The patient reports no problems. Hypothyroidism: Patient's most recent TSH and T4 levels were normal.  Patient is currently managed on levothyroxine 88 mcg daily. Patient does have regular follow-up with endocrinology regarding this. Malignant neoplasm of breast: Patient does have regular follow-up with hematology/oncology regarding this and she was previously being treated with anastrozole but this was recently discontinued by her oncologist.    HEIKE: Well-controlled with at bedtime use of CPAP. Diet and Physical Activity  Diet/Nutrition: well balanced diet, limited junk food and consuming 3-5 servings of fruits/vegetables daily. Exercise: moderate cardiovascular exercise, strength training exercises, 5-7 times a week on average and 1-2 hours on average. Depression Screening  PHQ-2/9 Depression Screening    Little interest or pleasure in doing things: 0 - not at all  Feeling down, depressed, or hopeless: 0 - not at all  PHQ-2 Score: 0  PHQ-2 Interpretation: Negative depression screen       General Health  Sleep: sleeps well and gets 7-8 hours of sleep on average. Hearing: normal - bilateral.  Vision: most recent eye exam >1 year ago and wears glasses. Dental: regular dental visits, brushes teeth twice daily and flosses teeth occasionally. /GYN Health  Patient is: postmenopausal  Last menstrual period: NA  Contraceptive method: None.      Review of Systems:     Review of Systems Constitutional: Negative for chills and fever. HENT: Negative for ear pain and sore throat. Eyes: Negative for pain and visual disturbance. Respiratory: Negative for cough, chest tightness, shortness of breath and wheezing. Cardiovascular: Negative for chest pain, palpitations and leg swelling. Gastrointestinal: Negative for abdominal pain, blood in stool, constipation, diarrhea and vomiting. Endocrine: Negative for cold intolerance and heat intolerance. Genitourinary: Negative for decreased urine volume, dysuria and hematuria. Musculoskeletal: Negative for arthralgias, back pain and myalgias. Skin: Negative for color change and rash. Allergic/Immunologic: Negative for environmental allergies. Neurological: Negative for dizziness, seizures, syncope, weakness, light-headedness, numbness and headaches. Hematological: Negative for adenopathy. Psychiatric/Behavioral: Negative for confusion. The patient is not nervous/anxious. All other systems reviewed and are negative. Past Medical History:     Past Medical History:   Diagnosis Date   • Anxiety    • Basal cell carcinoma (BCC) 2019    nose   • Bilateral breast cysts    • Depression    • Frequent urination    • Goiter    • Hashimoto's disease    • Hypothyroidism    • Night sweats    • Ovarian cyst    • Palpitations     improved since lowered caffeine intake   • Tinnitus     b/l   • Viral syndrome       Past Surgical History:     Past Surgical History:   Procedure Laterality Date   • BREAST BIOPSY Right 03.01/18    DCIS   • BREAST BIOPSY Left 04/2018    benign   • BREAST LUMPECTOMY Right 04/24/2018    Procedure: LUMPECTOMY BREAST NEEDLE LOCALIZED BRACKET;  Surgeon: Mile Christina MD;  Location: [facility] OR;  Service: Surgical Oncology   • COLONOSCOPY  11/11/2013    Dr. Sophie Cornejo - normal, no bx taken. • EGD  11/11/2013    Dr. Sophie Cornejo - overall normal. Due to dysphagia, bx of distal esophagus was taken.  Bx(esophagus): histologically normal squamous epithelium. • ESOPHAGOGASTRODUODENOSCOPY     • LYMPH NODE BIOPSY Right 04/24/2018    Procedure: BIOPSY LYMPH NODE SENTINEL;  Surgeon: Doug Way MD;  Location: [facility];  Service: Surgical Oncology   • MAMMO NEEDLE LOCALIZATION RIGHT (ALL INC) Right 04/24/2018   • MAMMO NEEDLE LOCALIZATION RIGHT (ALL INC) EACH ADD Right 04/24/2018   • MAMMO STEREOTACTIC BREAST BIOPSY RIGHT (ALL INC) Right 03/01/2018   • MRI BREAST BIOPSY LEFT (ALL INCLUSIVE) Left 04/16/2018   • WISDOM TOOTH EXTRACTION        Social History:     Social History     Socioeconomic History   • Marital status: /Civil Union     Spouse name: None   • Number of children: None   • Years of education: None   • Highest education level: None   Occupational History   • Occupation:    Tobacco Use   • Smoking status: Never   • Smokeless tobacco: Never   Vaping Use   • Vaping Use: Never used   Substance and Sexual Activity   • Alcohol use:  Yes     Alcohol/week: 7.0 standard drinks of alcohol     Types: 7 Standard drinks or equivalent per week     Comment: 7 -8 drinks a week   • Drug use: No   • Sexual activity: None   Other Topics Concern   • None   Social History Narrative   • None     Social Determinants of Health     Financial Resource Strain: Not on file   Food Insecurity: Not on file   Transportation Needs: Not on file   Physical Activity: Not on file   Stress: Not on file   Social Connections: Not on file   Intimate Partner Violence: Not on file   Housing Stability: Not on file      Family History:     Family History   Problem Relation Age of Onset   • Anemia Mother    • Kidney disease Mother    • Skin cancer Mother    • Dementia Father    • Heart disease Father    • Diabetes Father    • Glaucoma Father    • Hypothyroidism Father    • No Known Problems Sister    • Hypothyroidism Brother    • No Known Problems Maternal Grandmother    • No Known Problems Maternal Grandfather    • No Known Problems Paternal Grandmother    • No Known Problems Paternal Grandfather    • No Known Problems Maternal Aunt    • No Known Problems Paternal Aunt    • No Known Problems Paternal Aunt    • Prostate cancer Paternal Uncle 64      Current Medications:     Current Outpatient Medications   Medication Sig Dispense Refill   • benzoyl peroxide 5 % external wash WASH ACNE AFFECTED AREAS TWICE DAILY. • Calcium Carb-Cholecalciferol (CALCIUM+D3 PO) Take 1 tablet by mouth daily      • Cranberry (CRANBEREX PO) Take by mouth     • levothyroxine 88 mcg tablet Take 1 tablet by mouth daily     • Magnesium 400 MG TABS Take by mouth daily     • Multiple Vitamin (MULTIVITAMIN) tablet Take 1 tablet by mouth daily     • clindamycin (CLINDAGEL) 1 % gel USE ONCE DAILY TO ACNE AREAS ON FACE. No current facility-administered medications for this visit. Allergies: Allergies   Allergen Reactions   • Clindamycin Other (See Comments)     Was painful to walk    • Erythromycin Hives   • Sulfamethoxazole-Trimethoprim Hives   • Ceclor [Cefaclor] Other (See Comments)     Bad Reaction   • Amoxicillin Hives and Rash      Physical Exam:     /66 (BP Location: Right arm, Patient Position: Sitting, Cuff Size: Large)   Pulse 67   Ht 5' 6" (1.676 m)   Wt 82 kg (180 lb 12.8 oz)   LMP  (LMP Unknown)   SpO2 97%   BMI 29.18 kg/m²     Physical Exam  Vitals and nursing note reviewed. Constitutional:       General: She is not in acute distress. Appearance: Normal appearance. She is well-developed. She is not ill-appearing. HENT:      Head: Normocephalic. Eyes:      Conjunctiva/sclera: Conjunctivae normal.   Cardiovascular:      Rate and Rhythm: Normal rate and regular rhythm. Pulses: Normal pulses. Carotid pulses are 2+ on the right side and 2+ on the left side. Radial pulses are 2+ on the right side and 2+ on the left side. Posterior tibial pulses are 2+ on the right side and 2+ on the left side.       Heart sounds: Normal heart sounds. No murmur heard. Pulmonary:      Effort: Pulmonary effort is normal. No respiratory distress. Breath sounds: Normal breath sounds. No decreased breath sounds, wheezing, rhonchi or rales. Abdominal:      General: Abdomen is flat. Bowel sounds are normal. There is no distension. Palpations: Abdomen is soft. Tenderness: There is no abdominal tenderness. There is no guarding. Musculoskeletal:         General: No swelling. Normal range of motion. Cervical back: Normal range of motion and neck supple. Right lower leg: No edema. Left lower leg: No edema. Skin:     General: Skin is warm and dry. Capillary Refill: Capillary refill takes less than 2 seconds. Neurological:      General: No focal deficit present. Mental Status: She is alert and oriented to person, place, and time. Psychiatric:         Mood and Affect: Mood normal.         Behavior: Behavior normal.         Thought Content:  Thought content normal.         Judgment: Judgment normal.          AMANDA Gongora  [facility] PRIMARY CARE

## 2023-09-13 NOTE — ASSESSMENT & PLAN NOTE
Well-controlled on current regimen. Patient does have regular follow-up with endocrinology regarding this.

## 2023-09-15 ENCOUNTER — CLINICAL SUPPORT (OUTPATIENT)
Dept: FAMILY MEDICINE CLINIC | Facility: CLINIC | Age: 58
End: 2023-09-15

## 2023-09-15 DIAGNOSIS — Z11.1 ENCOUNTER FOR PPD SKIN TEST READING: Primary | ICD-10-CM

## 2023-09-15 NOTE — PROGRESS NOTES
PPD Reading Note  PPD read and results entered in [facility]. Result: 0 mm induration.   Interpretation: NEG  If test not read within 48-72 hours of initial placement, patient advised to repeat in other arm 1-3 weeks after this test.  Allergic reaction: No

## 2023-09-20 LAB
ALBUMIN SERPL-MCNC: 4 G/DL (ref 3.6–5.1)
ALBUMIN/GLOB SERPL: 1.3 (CALC) (ref 1–2.5)
ALP SERPL-CCNC: 92 U/L (ref 37–153)
ALT SERPL-CCNC: 31 U/L (ref 6–29)
APPEARANCE UR: CLEAR
AST SERPL-CCNC: 28 U/L (ref 10–35)
BACTERIA UR QL AUTO: ABNORMAL /HPF
BILIRUB SERPL-MCNC: 0.4 MG/DL (ref 0.2–1.2)
BILIRUB UR QL STRIP: NEGATIVE
BUN SERPL-MCNC: 19 MG/DL (ref 7–25)
BUN/CREAT SERPL: ABNORMAL (CALC) (ref 6–22)
CALCIUM SERPL-MCNC: 9.5 MG/DL (ref 8.6–10.4)
CHLORIDE SERPL-SCNC: 104 MMOL/L (ref 98–110)
CHOLEST SERPL-MCNC: 192 MG/DL
CHOLEST/HDLC SERPL: 3.1 (CALC)
CO2 SERPL-SCNC: 29 MMOL/L (ref 20–32)
COLOR UR: YELLOW
CREAT SERPL-MCNC: 0.74 MG/DL (ref 0.5–1.05)
GFR/BSA.PRED SERPLBLD CYS-BASED-ARV: 93 ML/MIN/1.73M2
GLOBULIN SER CALC-MCNC: 3 G/DL (CALC) (ref 1.9–3.7)
GLUCOSE SERPL-MCNC: 96 MG/DL (ref 65–99)
GLUCOSE UR QL STRIP: NEGATIVE
HDLC SERPL-MCNC: 61 MG/DL
HGB UR QL STRIP: NEGATIVE
HYALINE CASTS #/AREA URNS LPF: ABNORMAL /LPF
KETONES UR QL STRIP: NEGATIVE
LDLC SERPL CALC-MCNC: 111 MG/DL (CALC)
LEUKOCYTE ESTERASE UR QL STRIP: ABNORMAL
NITRITE UR QL STRIP: NEGATIVE
NONHDLC SERPL-MCNC: 131 MG/DL (CALC)
PH UR STRIP: 6.5 [PH] (ref 5–8)
POTASSIUM SERPL-SCNC: 4.1 MMOL/L (ref 3.5–5.3)
PROT SERPL-MCNC: 7 G/DL (ref 6.1–8.1)
PROT UR QL STRIP: NEGATIVE
RBC #/AREA URNS HPF: ABNORMAL /HPF
SODIUM SERPL-SCNC: 139 MMOL/L (ref 135–146)
SP GR UR STRIP: 1.01 (ref 1–1.03)
SPECIMEN SOURCE CVX/VAG CYTO: NORMAL
SQUAMOUS #/AREA URNS HPF: ABNORMAL /HPF
TRANSFUSION STATUS PATIENT QL: NORMAL
TRIGL SERPL-MCNC: 94 MG/DL
VZV AG SPEC QL IF: NORMAL
WBC #/AREA URNS HPF: ABNORMAL /HPF

## 2024-01-29 ENCOUNTER — TELEPHONE (OUTPATIENT)
Dept: SURGICAL ONCOLOGY | Facility: CLINIC | Age: 59
End: 2024-01-29

## 2024-01-29 NOTE — TELEPHONE ENCOUNTER
Left message for patient requesting to move 2/28 appt with Henri PATEL to 11am or 2pm. Left my direct line and service line number as a call back.

## 2024-02-26 ENCOUNTER — HOSPITAL ENCOUNTER (OUTPATIENT)
Dept: MAMMOGRAPHY | Facility: MEDICAL CENTER | Age: 59
Discharge: HOME | End: 2024-02-26
Payer: COMMERCIAL

## 2024-02-26 VITALS — BODY MASS INDEX: 29.05 KG/M2 | HEIGHT: 66 IN | WEIGHT: 180.78 LBS

## 2024-02-26 DIAGNOSIS — Z12.31 SCREENING MAMMOGRAM FOR BREAST CANCER: ICD-10-CM

## 2024-02-26 PROCEDURE — 77063 BREAST TOMOSYNTHESIS BI: CPT

## 2024-02-26 PROCEDURE — 77067 SCR MAMMO BI INCL CAD: CPT

## 2024-02-28 ENCOUNTER — ONCOLOGY SURVIVORSHIP (OUTPATIENT)
Dept: SURGICAL ONCOLOGY | Facility: CLINIC | Age: 59
End: 2024-02-28
Payer: COMMERCIAL

## 2024-02-28 VITALS
HEIGHT: 66 IN | WEIGHT: 182.4 LBS | SYSTOLIC BLOOD PRESSURE: 110 MMHG | HEART RATE: 81 BPM | OXYGEN SATURATION: 97 % | TEMPERATURE: 97.5 F | RESPIRATION RATE: 14 BRPM | BODY MASS INDEX: 29.32 KG/M2 | DIASTOLIC BLOOD PRESSURE: 68 MMHG

## 2024-02-28 DIAGNOSIS — Z85.3 ENCOUNTER FOR FOLLOW-UP SURVEILLANCE OF BREAST CANCER: ICD-10-CM

## 2024-02-28 DIAGNOSIS — Z08 ENCOUNTER FOR FOLLOW-UP SURVEILLANCE OF BREAST CANCER: ICD-10-CM

## 2024-02-28 DIAGNOSIS — Z85.3 HISTORY OF RIGHT BREAST CANCER: Primary | ICD-10-CM

## 2024-02-28 PROBLEM — Z79.811 USE OF ANASTROZOLE: Status: RESOLVED | Noted: 2018-08-07 | Resolved: 2024-02-28

## 2024-02-28 PROCEDURE — 99215 OFFICE O/P EST HI 40 MIN: CPT | Performed by: NURSE PRACTITIONER

## 2024-02-28 NOTE — PROGRESS NOTES
Assessment/Plan:    Diagnoses and all orders for this visit:    History of right breast cancer  -     Ambulatory referral to oncology social worker; Future  -     Ambulatory Referral to Oncology Genetics; Future    Encounter for follow-up surveillance of breast cancer    Patient is a 58-year-old female with a right-sided breast cancer in March 2018. 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  She underwent a right lumpectomy and sentinel node biopsy with Dr. Green.  At this time her pathology revealed IDC, ER receptor was 90-95% positive, UT negative, HER2 FISH equivocal.  She completed adjuvant radiation therapy and 5 years of anastrozole therapy.  She is now on observation.  Breast cancer survivorship visit performed today and treatment summary and care plan were reviewed with patient.  She had a bilateral 3D screening mammogram 2 days ago which revealed a left breast asymmetry.  She is scheduled for a diagnostic mammogram and ultrasound on 4/1.  She offers no new complaints today and there are no concerns on today's clinical exam.  She is interested in genetic testing, referral placed.  She will think about the strength ABC program and will contact me if she is interested in a referral.  She is up-to-date on colorectal cancer screening but has not had a recent Pap smear.  She states she will contact the gynecologist when she is ready to schedule this.  Assuming her upcoming diagnostic breast imaging is benign, we will plan to see her back in 1 year or sooner should the need arise.  She was instructed to contact us with any changes or concerns in the interim.  All of her questions were answered today.    REASON FOR VISIT:   Survivorship      Previous therapy:  Oncology History   Malignant neoplasm of upper-outer quadrant of right breast in female, estrogen receptor positive    3/1/2018 Biopsy    Right breast biopsy:  - microinvasive ductal carcinoma  Grade 3  ER  "0%  PA 0%  HER2 positive     4/24/2018 Surgery    Right breast lumpectomy with sentinel lymph node biopsy:  - Clear margins  - 0/1 lymph node    Dr. Green     5/2018 - 7/2023 Hormone Therapy    Anastrozole 1 mg daily    - Dr Ramos     6/19/2018 - 7/18/2018 Radiation    Right breast to 4256 cGy in 16 fractions followed by a boost to the lumpectomy cavity of an additional 1000 cGy.       - Dr Langley           Patient ID: Diana Green is a 58 y.o. female  Presenting today for a breast cancer survivorship visit.  She has not appreciated any changes on self breast exam.  She completed 5 years of anastrozole therapy and is now on observation.  She had a bilateral 3D screening mammogram performed earlier this week which revealed a left breast asymmetry.  She is scheduled for a diagnostic mammogram and ultrasound on 4/1.  She denies persistent headaches, back pain or bone pain, cough or shortness of breath, abdominal pain.          Review of Systems   Constitutional:  Negative for activity change, appetite change, chills, fatigue, fever and unexpected weight change.   Respiratory:  Negative for cough and shortness of breath.    Cardiovascular:  Negative for chest pain.   Gastrointestinal:  Negative for abdominal pain, constipation, diarrhea, nausea and vomiting.   Musculoskeletal:  Positive for arthralgias. Negative for back pain, gait problem and myalgias.   Skin:  Negative for color change and rash.   Neurological:  Negative for dizziness and headaches.   Hematological:  Negative for adenopathy.   Psychiatric/Behavioral:  Negative for agitation and confusion.    All other systems reviewed and are negative.      Objective:    Blood pressure 110/68, pulse 81, temperature 97.5 °F (36.4 °C), resp. rate 14, height 5' 6\" (1.676 m), weight 82.7 kg (182 lb 6.4 oz), SpO2 97%, not currently breastfeeding.  Body mass index is 29.44 kg/m².      Physical Exam  Vitals reviewed.   Constitutional:       General: She is not in acute " distress.     Appearance: Normal appearance. She is well-developed. She is not diaphoretic.   HENT:      Head: Normocephalic and atraumatic.   Cardiovascular:      Rate and Rhythm: Normal rate and regular rhythm.      Heart sounds: Normal heart sounds.   Pulmonary:      Effort: Pulmonary effort is normal.      Breath sounds: Normal breath sounds.   Chest:   Breasts:     Right: Skin change (surgical scars) present. No swelling, bleeding, inverted nipple, mass, nipple discharge or tenderness.      Left: No swelling, bleeding, inverted nipple, mass, nipple discharge, skin change or tenderness.   Abdominal:      Palpations: Abdomen is soft. There is no mass.      Tenderness: There is no abdominal tenderness.   Musculoskeletal:         General: Normal range of motion.      Cervical back: Normal range of motion.   Lymphadenopathy:      Upper Body:      Right upper body: No supraclavicular or axillary adenopathy.      Left upper body: No supraclavicular or axillary adenopathy.   Skin:     General: Skin is warm and dry.      Findings: No rash.   Neurological:      Mental Status: She is alert and oriented to person, place, and time.   Psychiatric:         Speech: Speech normal.             Discussed symptoms related to disease recurrence, Yes    Evaluated for late effects related to cancer treatment, Yes, describe: discussed effects of surgery, RT, AI therapy    Screening current for cervical cancer, No pt declines referral    Screening current for colon cancer, Yes, describe: Colonoscopy 5/2023, repeat in 10 years    Cancer rehabilitation services addressed, Yes, describe: will contact me if interested in referral    Screening current for osteoporosis, Yes, describe: dxa 9/2023    Oncology Treatment Summary reviewed with patient and copy provided, Yes    Referral placed for psychosocial evaluation/screening to oncology social work  Yes    I have spent 45 minutes with Patient  today in which greater than 50% of this time was  spent in counseling/coordination of care regarding breast cancer survivorship.

## 2024-02-29 ENCOUNTER — TELEPHONE (OUTPATIENT)
Dept: HEMATOLOGY ONCOLOGY | Facility: CLINIC | Age: 59
End: 2024-02-29

## 2024-02-29 NOTE — TELEPHONE ENCOUNTER
I called Diana in response to a referral that was received for patient to establish care with [facility].     Outreach was made to schedule a consultation.    Patient declined scheduling. The referral has been closed.

## 2024-03-04 ENCOUNTER — TELEPHONE (OUTPATIENT)
Dept: HEMATOLOGY ONCOLOGY | Facility: CLINIC | Age: 59
End: 2024-03-04

## 2024-03-04 NOTE — TELEPHONE ENCOUNTER
I spoke with Diana to schedule their consultation with [facility].     Scheduling Outcome: Patient is scheduled for an appointment on 7/31/24 at 1:00 PM with Shabnam Iyer    Personal/Family History Related to Appointment:     Personal History of Cancer: Patient reports personal history of breast cancer diagnosed age 53    Family History of Cancer: Patient reports family history of paternal uncle prostate cancer diagnosed age 65    Is patient of Ashkenazi [employer]ism Heritage?: No    History of Genetic Testing:  Personal History of Genetic Testing: Patient report no personal history of Genetic Testing.    Family History of Genetic Testing: Patient reports that no family members have had Genetic Testing.     Patient's preferred e-mail address: [email or web address]

## 2024-03-06 ENCOUNTER — PATIENT OUTREACH (OUTPATIENT)
Dept: CASE MANAGEMENT | Facility: OTHER | Age: 59
End: 2024-03-06

## 2024-03-12 ENCOUNTER — PATIENT OUTREACH (OUTPATIENT)
Dept: CASE MANAGEMENT | Facility: OTHER | Age: 59
End: 2024-03-12

## 2024-03-12 NOTE — PROGRESS NOTES
OSW placed outreach TC to pt this morning. OSW introduced self and role. Pt was at a restaurant and unable to speak. OSW asked when a better day/time would be to reach out She states that she is doing fine and does not feel it is necessary to call back. OSW will close the referral at this time.

## 2024-04-01 ENCOUNTER — HOSPITAL ENCOUNTER (OUTPATIENT)
Dept: MAMMOGRAPHY | Facility: CLINIC | Age: 59
Discharge: HOME | End: 2024-04-01
Payer: COMMERCIAL

## 2024-04-01 ENCOUNTER — HOSPITAL ENCOUNTER (OUTPATIENT)
Dept: ULTRASOUND IMAGING | Facility: CLINIC | Age: 59
Discharge: HOME | End: 2024-04-01
Payer: COMMERCIAL

## 2024-04-01 VITALS — WEIGHT: 182 LBS | HEIGHT: 66 IN | BODY MASS INDEX: 29.25 KG/M2

## 2024-04-01 DIAGNOSIS — R92.8 ABNORMAL MAMMOGRAM: ICD-10-CM

## 2024-04-01 PROCEDURE — 77065 DX MAMMO INCL CAD UNI: CPT

## 2024-04-01 PROCEDURE — G0279 TOMOSYNTHESIS, MAMMO: HCPCS

## 2024-04-01 PROCEDURE — 76642 ULTRASOUND BREAST LIMITED: CPT

## 2024-04-12 ENCOUNTER — TELEPHONE (OUTPATIENT)
Dept: HEMATOLOGY ONCOLOGY | Facility: CLINIC | Age: 59
End: 2024-04-12

## 2024-04-12 DIAGNOSIS — Z12.31 SCREENING MAMMOGRAM FOR BREAST CANCER: Primary | ICD-10-CM

## 2024-04-12 NOTE — TELEPHONE ENCOUNTER
Patient Call    Who are you speaking with? Patient    If it is not the patient, are they listed on an active communication consent form? N/A   What is the reason for this call? She asked about her results and scheduling her next mammo   Does this require a call back? Yes   If a call back is required, please list best call back number [phone]    If a call back is required, advise that a message will be forwarded to their care team and someone will return their call as soon as possible.   Did you relay this information to the patient? Yes

## 2024-04-12 NOTE — TELEPHONE ENCOUNTER
Called patient, confirmed last name and , and discussed results per Henri. Patient states she already scheduled her mammogram for next year for 3/3/25.

## 2024-07-29 ENCOUNTER — DOCUMENTATION (OUTPATIENT)
Dept: GENETICS | Facility: CLINIC | Age: 59
End: 2024-07-29

## 2024-07-31 ENCOUNTER — CLINICAL SUPPORT (OUTPATIENT)
Dept: GENETICS | Facility: CLINIC | Age: 59
End: 2024-07-31
Payer: COMMERCIAL

## 2024-07-31 ENCOUNTER — DOCUMENTATION (OUTPATIENT)
Dept: GENETICS | Facility: CLINIC | Age: 59
End: 2024-07-31

## 2024-07-31 DIAGNOSIS — Z80.42 FAMILY HISTORY OF PROSTATE CANCER: ICD-10-CM

## 2024-07-31 DIAGNOSIS — Z85.3 HISTORY OF RIGHT BREAST CANCER: Primary | ICD-10-CM

## 2024-07-31 PROCEDURE — 36415 COLL VENOUS BLD VENIPUNCTURE: CPT

## 2024-07-31 NOTE — PROGRESS NOTES
Pre-Test Genetic Counseling Consult Note    Patient Name: Diana Green   /Age: 1965/59 y.o.  Referring Provider: AMANDA Botello     Date of Service: 2024  Genetic Counselor: Shabnam Iyer MS, [facility] [place]  Interpretation Services: None  Location: In-person consult at [place]  Length of Visit: 60 Minutes    Diana was referred to the [facility] Cancer Risk and Genetic Assessment Program due to her personal history of breast cancer and family history of prostate and skin cancer among others . she presents today to discuss the possibility of a hereditary cancer syndrome, options for genetic testing, and implications for her and her family.     Cancer History and Treatment:     Personal History: Personal history of BCC and right breast cancer at age 53    Oncology History   History of right breast cancer   3/1/2018 Biopsy    Right breast biopsy:  - microinvasive ductal carcinoma  Grade 3  ER 0%  TX 0%  HER2 positive     2018 Surgery    Right breast lumpectomy with sentinel lymph node biopsy:    Invasive ductal carcinoma  ER 90-95%  TX negative  HER-2 FISH Equivocal    - Clear margins  - 0/1 lymph node    Dr. Green     2018 - 2023 Hormone Therapy    Anastrozole 1 mg daily    - Dr Ramos     2018 - 2018 Radiation    Right breast to 4256 cGy in 16 fractions followed by a boost to the lumpectomy cavity of an additional 1000 cGy.       - Dr Langley       Screening Hx:     Breast: - Follows with AMANDA Jackson   Breast Imaging: Annual   Breast Density: Heterogeneously dense    Colon:  Colonoscopy: Most recent colonoscopy was 23; no history of polyps; recommended to repeat in 10 years     Gynecologic:  Ovaries and Uterus intact     Skin:  Sees derm annually    Reproductive History: Not assessed     Medical and Surgical History  Pertinent surgical history:   Past Surgical History:   Procedure Laterality Date    BREAST BIOPSY Right     DCIS    BREAST BIOPSY Left 2018  "   benign    BREAST LUMPECTOMY Right 04/24/2018    Procedure: LUMPECTOMY BREAST NEEDLE LOCALIZED BRACKET;  Surgeon: Mendy Green MD;  Location: [facility];  Service: Surgical Oncology    COLONOSCOPY  11/11/2013    Dr. Flores - normal, no bx taken.    EGD  11/11/2013    Dr. Flores - overall normal. Due to dysphagia, bx of distal esophagus was taken. Bx(esophagus): histologically normal squamous epithelium.    ESOPHAGOGASTRODUODENOSCOPY      LYMPH NODE BIOPSY Right 04/24/2018    Procedure: BIOPSY LYMPH NODE SENTINEL;  Surgeon: Mendy Green MD;  Location: [facility];  Service: Surgical Oncology    MAMMO NEEDLE LOCALIZATION RIGHT (ALL INC) Right 04/24/2018    MAMMO NEEDLE LOCALIZATION RIGHT (ALL INC) EACH ADD Right 04/24/2018    MAMMO STEREOTACTIC BREAST BIOPSY RIGHT (ALL INC) Right 03/01/2018    MRI BREAST BIOPSY LEFT (ALL INCLUSIVE) Left 04/16/2018    WISDOM TOOTH EXTRACTION        Pertinent medical history:  Past Medical History:   Diagnosis Date    Anxiety     Basal cell carcinoma (BCC) 2019    nose    Bilateral breast cysts     Breast cancer (HCC) 2018    Depression     Frequent urination     Goiter     Hashimoto's disease     Hypothyroidism     Night sweats     Ovarian cyst     Palpitations     improved since lowered caffeine intake    Tinnitus     b/l    Viral syndrome        Other History:  Height:   Ht Readings from Last 1 Encounters:   04/01/24 5' 6\" (1.676 m)     Weight:   Wt Readings from Last 1 Encounters:   04/01/24 82.6 kg (182 lb)     Relevant Family History   Patient reports no Ashkenazi Jain ancestry.         Please refer to the scanned pedigree in the Media Tab for a complete family history     *All history is reported as provided by the patient; records are not available for review, except where indicated.     Assessment:  We discussed sporadic, familial and hereditary cancer.  We also discussed the many factors that influence our risk for cancer such as age, environmental exposures, lifestyle " choices and family history.      We reviewed the indications suggestive of a hereditary predisposition to cancer.    Genetic testing is indicated for Diana based on the following criteria: Meets NCCN V2.2024 Testing Criteria for High-Penetrance Breast Cancer Susceptibility Genes which states that testing may be considered when there is a personal history of breast cancer <60 y as this approaches a 2.5% probability of having a P/LP variant, based on recent data.  Diana was diagnosed with breast cancer at age 53.      The risks, benefits, and limitations of genetic testing were reviewed with the patient, as well as genetic discrimination laws, and possible test results (positive, negative, variants of uncertain significance) and their clinical implications. If positive for a mutation, options for managing cancer risk including increased surveillance, chemoprevention, and in some cases prophylactic surgery were discussed. Diana was informed that if a hereditary cancer syndrome was identified in her, first degree relatives (parents, siblings, and children) have a chance of also inheriting the condition. Genetic testing would allow for predictive genetic testing in other relatives, who may also be at risk depending on their degree of relation.     Billing:  Most individuals pay <$100 for hereditary cancer genetic testing. If insurance covers the cost of the testing, individuals may still pay out of pocket secondary to co-pays, co-insurance, or deductibles. If the cost of the testing exceeds $100, the lab will reach out to the patient via phone or e-mail. The patient will then have the option to proceed with the testing, cancel the testing, or elect the self-pay option of $250. Diana verbalized understanding.     Plan: Patient decided to proceed with testing and provided consent.    Summary:     Sample Collection:  The patient's blood sample was drawn in the office on 7/31/24 by genetic counseling assistant Gómez  Adam    Genetic Testing Preformed: CustomNext: Cancer® +RNAinsight® (59 genes): APC, TAMMY, AXIN2, BAP1, BARD1, BMPR1A, BRCA1, BRCA2, BRIP1, CDH1, CDK4, CDKN1B, CDKN2A, CHEK2, CTNNA1, DICER1, EGLN1, EPCAM, FH, FLCN, GREM1, HOXB13, KIF1B, KIT, MAX, MEN1, MET, MITF, MLH1, MSH2, MSH3, MSH6, MUTYH, NF1, NTHL1, PALB2, PDGFRA PMS2, POLD1, POLE, POT1, PTEN, RAD51C, RAD51D, RB1, RET, SDHA, SDHAF2, SDHB, SDHC, SDHD, SMAD4, SMARCA4, STK11, [id number], TP53, TSC1, TSC2, VHL      Result Call Information:  In the event that we need to reach Diana via telephone:  I confirmed the patient's mobile number on file as the best number to call with results  I confirmed with the patient that we can leave a voicemail on the provided numbers    Results take approximately 2-3 weeks to complete once test is started.    Diana will be notified via [employer] once results are available.      Additional recommendations for surveillance/medical management will be made pending genetic test results.

## 2024-08-09 ENCOUNTER — TELEPHONE (OUTPATIENT)
Dept: GENETICS | Facility: CLINIC | Age: 59
End: 2024-08-09

## 2024-08-09 NOTE — TELEPHONE ENCOUNTER
Post-Test Genetic Counseling Consult Note    Today I spoke with Diana over the phone to review the results of her genetic test for hereditary cancer. Diana met previously with Shabnam Iyer on 7/31/24 for pre-test counseling.  A copy of this consult note and genetic test result will be shared with the patient.      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 The significance of the APC variant is currently not known and therefore this test result cannot be used to help determine Diana cancer risks.      It is possible that the variant was seen in only a handful of individuals, or there may be conflicting or incomplete information in the medical literature about the variant and its association with hereditary cancer.     The laboratory will continue to accumulate information on this variant and will reclassify it as either a positive or negative genetic test result when they are confident that they have adequate information. We will notify Diana as updated information is obtained. It is important to note that the majority of variants of uncertain significance are reclassified as likely benign or benign as additional information about the variant becomes available.      Risk Based on Family History:  The significance of this variant is currently not known and therefore this test result cannot be used to help determine Diana cancer risks. Rather her personal medical history and family history of cancer are the most important factors used to estimate her risk for developing certain cancers and to direct her medical management.     Risks and Testing for Family Members:  Genetic testing for this variant is not recommended for relatives who wish to determine their cancer risks for purposes of determining medical management. The presence or absence of this variant in a relative is not clinically meaningful unless the variant is reclassified in the future.     Despite this result, Diana's first-degree relatives may be at increased risk for the cancers based on the family history. We recommend they discuss screening and management recommendations with their healthcare providers.    If Diana has any affected family members with a cancer diagnosis, especially at a young age, they may still consider genetic testing. Relatives who wish to pursue genetic testing can reach out to the [facility] [phone] to schedule an appointment or visit [email or web address] to identify a local genetic counselor.     Plan:   There are no additional recommendations based on Diana's result. she should continue cancer screening and medical management as clinically indicated and as determined appropriate by her healthcare providers.    VUS Result: Diana was strongly encouraged to contact us regarding any changes in her personal or family history of cancer as these changes could alter our recommendation regarding genetic testing and/or cancer screening. Diana was also encouraged to follow up with us on an annual basis as variant classifications are subject to change.

## 2024-12-30 ENCOUNTER — TELEPHONE (OUTPATIENT)
Age: 59
End: 2024-12-30

## 2024-12-31 ENCOUNTER — TELEPHONE (OUTPATIENT)
Age: 59
End: 2024-12-31

## 2024-12-31 NOTE — TELEPHONE ENCOUNTER
Patient calling to inform Shabnam Iyer she received a bill for the genetic testing that was done 7/31/24.  Her insurance company states there wasn't a pre authorization.  Please call her asap at [phone]

## 2025-01-02 NOTE — TELEPHONE ENCOUNTER
I left a voice message for Diana today regarding her out of pocket cost for the genetic test through [employer].  I spoke with a representative at [facility] who confirmed that no bill was issued by [employer].  Diana most likely received an explanation of benefits from her insurance company.  At this time, Diana does not owe anything to [employer] as we wait for the appeal process to conclude.  It is my understanding that [facility] and the insurance have all the information needed and no additional information is required from our team. I explained that [facility] will email Diana a financial assistance application which I encouraged her to complete and return.  I left my direct phone number [phone] for Diana to call back with questions.

## 2025-01-03 ENCOUNTER — TELEPHONE (OUTPATIENT)
Age: 60
End: 2025-01-03

## 2025-01-03 NOTE — TELEPHONE ENCOUNTER
I spoke with Diana today regarding her billing questions.  See my phone note from 1/2/25 for details discussed. All of her questions were answered and we encouraged her to complete the [email or web address] Financial Assistance Form.  She can reach us at [phone] option 3 with questions.

## 2025-01-03 NOTE — TELEPHONE ENCOUNTER
Patient is calling asking for a call back from Shabnam Iyer to further discuss her phone call from the other day

## 2025-03-03 ENCOUNTER — HOSPITAL ENCOUNTER (OUTPATIENT)
Dept: MAMMOGRAPHY | Facility: MEDICAL CENTER | Age: 60
Discharge: HOME | End: 2025-03-03
Payer: COMMERCIAL

## 2025-03-03 VITALS — BODY MASS INDEX: 29.25 KG/M2 | HEIGHT: 66 IN | WEIGHT: 182 LBS

## 2025-03-03 DIAGNOSIS — Z12.31 SCREENING MAMMOGRAM FOR BREAST CANCER: ICD-10-CM

## 2025-03-03 PROCEDURE — 77067 SCR MAMMO BI INCL CAD: CPT

## 2025-03-03 PROCEDURE — 77063 BREAST TOMOSYNTHESIS BI: CPT

## 2025-03-05 ENCOUNTER — ONCOLOGY SURVIVORSHIP (OUTPATIENT)
Dept: SURGICAL ONCOLOGY | Facility: CLINIC | Age: 60
End: 2025-03-05
Payer: COMMERCIAL

## 2025-03-05 VITALS
OXYGEN SATURATION: 93 % | SYSTOLIC BLOOD PRESSURE: 100 MMHG | HEART RATE: 80 BPM | DIASTOLIC BLOOD PRESSURE: 60 MMHG | HEIGHT: 66 IN | TEMPERATURE: 97.4 F | WEIGHT: 179 LBS | BODY MASS INDEX: 28.77 KG/M2

## 2025-03-05 DIAGNOSIS — Z08 ENCOUNTER FOR FOLLOW-UP SURVEILLANCE OF BREAST CANCER: Primary | ICD-10-CM

## 2025-03-05 DIAGNOSIS — Z85.3 ENCOUNTER FOR FOLLOW-UP SURVEILLANCE OF BREAST CANCER: Primary | ICD-10-CM

## 2025-03-05 DIAGNOSIS — Z85.3 HISTORY OF RIGHT BREAST CANCER: ICD-10-CM

## 2025-03-05 PROCEDURE — 99213 OFFICE O/P EST LOW 20 MIN: CPT | Performed by: NURSE PRACTITIONER

## 2025-03-05 NOTE — PROGRESS NOTES
Name: Diana Green      : 1965      MRN: [id number]  Encounter Provider: AMANDA Botello  Encounter Date: 3/5/2025   Encounter department: [facility] [place]  :  Assessment & Plan  Encounter for follow-up surveillance of breast cancer         History of right breast cancer       Patient is a 59-year-old female with a right-sided breast cancer in 2018. Her biopsy pathology revealed microinvasive ductal carcinoma, ER/AZ negative, HER2 positive.  She underwent a right lumpectomy and sentinel node biopsy with Dr. Green.  At this time her pathology revealed IDC, ER receptor was 90-95% positive, AZ negative, HER2 FISH equivocal.  She completed adjuvant radiation therapy and 5 years of anastrozole therapy.  She is now on observation. She underwent genetic testing which revealed a VUS of APC but with otherwise negative. Follow up breast cancer survivorship visit performed today. She had a bilateral 3D screening mammogram two days ago which revealed a left breast asymmetry. She has been ordered for a diagnostic mammogram and ultrasound. She offers no new complaints today and there are no worrisome findings on today's clinical exam. She was again encouraged to schedule a gyn appt for updated cervical cancer screening. She is up to date on colorectal cancer screening and osteoporosis screening.  I will await the results from her upcoming diagnostic imaging and her next mammogram will be ordered at that time.  We will plan to see her back in 1 year for a follow-up survivorship visit or sooner should the need arise.  All questions were answered today.      Survivorship  Discussed symptoms related to disease recurrence, Yes     Evaluated for late effects related to cancer treatment, Yes     Screening current for cervical cancer, No encouraged patient to schedule appt with Gyn     Screening current for colon cancer, Yes     Cancer rehabilitation services addressed, No      Screening current for osteoporosis, Yes, will be due in September 9/2025         History of Present Illness   Diana Green is a 59 y.o. year old female who presents today for a follow-up survivorship visit.  She has not appreciated any changes on self breast exam.  She had a bilateral 3D screening mammogram performed yesterday which revealed an asymmetry in the right breast on the MLO view.  She has been ordered for a diagnostic mammogram and ultrasound but this has not yet been scheduled.  She denies any new or persistent headaches, back pain or bone pain, cough or shortness of breath, abdominal pain.    Oncology History   Cancer Staging   History of right breast cancer  Staging form: Breast, AJCC 8th Edition  - Clinical: cT1mi, cN0, cM0 - Signed by Mendy Green MD on 3/14/2018  Laterality: Right  - Pathologic: Stage IA (pT1a, pN0(sn), cM0, G2, ER: Positive, ID: Negative, HER2: Equivocal) - Signed by Mendy Green MD on 5/8/2018  Method of lymph node assessment: [place] lymph node biopsy  Histologic grading system: 3 grade system  Laterality: Right  Oncology History   History of right breast cancer   3/1/2018 Biopsy    Right breast biopsy:  - microinvasive ductal carcinoma  Grade 3  ER 0%  ID 0%  HER2 positive     4/24/2018 Surgery    Right breast lumpectomy with sentinel lymph node biopsy:    Invasive ductal carcinoma  ER 90-95%  ID negative  HER-2 FISH Equivocal    - Clear margins  - 0/1 lymph node    Dr. Green     5/2018 - 7/2023 Hormone Therapy    Anastrozole 1 mg daily    - Dr Ramos     6/19/2018 - 7/18/2018 Radiation    Right breast to 4256 cGy in 16 fractions followed by a boost to the lumpectomy cavity of an additional 1000 cGy.       - Dr Langley     8/2024 Genetic Testing    Test(s): CustomNext: Cancer® +RNAinsight® (59 genes): APC, TAMMY, AXIN2, BAP1, BARD1, BMPR1A, BRCA1, BRCA2, BRIP1, CDH1, CDK4, CDKN1B, CDKN2A, CHEK2, CTNNA1, DICER1, EGLN1, EPCAM, FH, FLCN, GREM1, HOXB13, KIF1B, KIT, MAX, MEN1,  "MET, MITF, MLH1, MSH2, MSH3, MSH6, MUTYH, NF1, NTHL1, PALB2, PDGFRA PMS2, POLD1, POLE, POT1, PTEN, RAD51C, RAD51D, RB1, RET, SDHA, SDHAF2, SDHB, SDHC, SDHD, SMAD4, SMARCA4, STK11, VCLP576, TP53, TSC1, TSC2, VHL       Result: Variant of uncertain significance     APC c.3659C>T (p.B9183H); heterozygous; uncertain significance         Review of Systems   Constitutional:  Negative for activity change, appetite change, chills, fatigue, fever and unexpected weight change.   Respiratory:  Negative for cough and shortness of breath.    Cardiovascular:  Negative for chest pain.   Gastrointestinal:  Negative for abdominal pain, constipation, diarrhea, nausea and vomiting.   Musculoskeletal:  Negative for arthralgias, back pain, gait problem and myalgias.   Skin:  Negative for color change and rash.   Neurological:  Negative for dizziness and headaches.   Hematological:  Negative for adenopathy.   Psychiatric/Behavioral:  Negative for agitation and confusion.    All other systems reviewed and are negative.   A complete review of systems is negative other than that noted above in the HPI.           Objective   /60 (BP Location: Left arm, Patient Position: Sitting, Cuff Size: Large)   Pulse 80   Temp (!) 97.4 °F (36.3 °C) (Temporal)   Ht 5' 6\" (1.676 m)   Wt 81.2 kg (179 lb)   LMP  (LMP Unknown)   SpO2 93%   BMI 28.89 kg/m²     Pain Screening:  Pain Score: 0-No pain  ECOG    Physical Exam  Vitals reviewed.   Constitutional:       General: She is not in acute distress.     Appearance: Normal appearance. She is well-developed. She is not diaphoretic.   HENT:      Head: Normocephalic and atraumatic.   Cardiovascular:      Rate and Rhythm: Normal rate and regular rhythm.      Heart sounds: Normal heart sounds.   Pulmonary:      Effort: Pulmonary effort is normal.      Breath sounds: Normal breath sounds.   Chest:   Breasts:     Right: Skin change (surgical scars) present. No swelling, bleeding, inverted nipple, mass, " nipple discharge or tenderness.      Left: No swelling, bleeding, inverted nipple, mass, nipple discharge, skin change or tenderness.   Abdominal:      Palpations: Abdomen is soft. There is no mass.      Tenderness: There is no abdominal tenderness.   Musculoskeletal:         General: Normal range of motion.      Cervical back: Normal range of motion.   Lymphadenopathy:      Upper Body:      Right upper body: No supraclavicular or axillary adenopathy.      Left upper body: No supraclavicular or axillary adenopathy.   Skin:     General: Skin is warm and dry.      Findings: No rash.   Neurological:      Mental Status: She is alert and oriented to person, place, and time.   Psychiatric:         Speech: Speech normal.

## 2025-03-05 NOTE — ASSESSMENT & PLAN NOTE
Patient is a 59-year-old female with a right-sided breast cancer in March 2018. Her biopsy pathology revealed microinvasive ductal carcinoma, ER/SD negative, HER2 positive.  She underwent a right lumpectomy and sentinel node biopsy with Dr. Green.  At this time her pathology revealed IDC, ER receptor was 90-95% positive, SD negative, HER2 FISH equivocal.  She completed adjuvant radiation therapy and 5 years of anastrozole therapy.  She is now on observation. She underwent genetic testing which revealed a VUS of APC but with otherwise negative. Follow up breast cancer survivorship visit performed today. She had a bilateral 3D screening mammogram two days ago which revealed a left breast asymmetry. She has been ordered for a diagnostic mammogram and ultrasound. She offers no new complaints today and there are no worrisome findings on today's clinical exam. She was again encouraged to schedule a gyn appt for updated cervical cancer screening. She is up to date on colorectal cancer screening and osteoporosis screening.  I will await the results from her upcoming diagnostic imaging and her next mammogram will be ordered at that time.  We will plan to see her back in 1 year for a follow-up survivorship visit or sooner should the need arise.  All questions were answered today.

## 2025-03-06 ENCOUNTER — RESULTS FOLLOW-UP (OUTPATIENT)
Dept: SURGICAL ONCOLOGY | Facility: CLINIC | Age: 60
End: 2025-03-06

## 2025-04-05 ENCOUNTER — HOSPITAL ENCOUNTER (OUTPATIENT)
Dept: MAMMOGRAPHY | Facility: CLINIC | Age: 60
Discharge: HOME | End: 2025-04-05
Payer: COMMERCIAL

## 2025-04-05 ENCOUNTER — HOSPITAL ENCOUNTER (OUTPATIENT)
Dept: ULTRASOUND IMAGING | Facility: CLINIC | Age: 60
Discharge: HOME | End: 2025-04-05
Payer: COMMERCIAL

## 2025-04-05 DIAGNOSIS — R92.8 ABNORMAL MAMMOGRAM: ICD-10-CM

## 2025-04-05 PROCEDURE — 77065 DX MAMMO INCL CAD UNI: CPT

## 2025-04-05 PROCEDURE — G0279 TOMOSYNTHESIS, MAMMO: HCPCS

## 2025-04-05 PROCEDURE — 76642 ULTRASOUND BREAST LIMITED: CPT

## 2025-04-07 ENCOUNTER — RESULTS FOLLOW-UP (OUTPATIENT)
Dept: SURGICAL ONCOLOGY | Facility: CLINIC | Age: 60
End: 2025-04-07

## 2025-04-07 DIAGNOSIS — R92.8 ABNORMAL FINDING ON BREAST IMAGING: Primary | ICD-10-CM

## 2025-05-23 ENCOUNTER — TELEPHONE (OUTPATIENT)
Age: 60
End: 2025-05-23

## 2025-05-23 DIAGNOSIS — Z13.0 SCREENING FOR DEFICIENCY ANEMIA: ICD-10-CM

## 2025-05-23 DIAGNOSIS — Z13.220 SCREENING FOR LIPID DISORDERS: Primary | ICD-10-CM

## 2025-08-01 ENCOUNTER — TELEPHONE (OUTPATIENT)
Age: 60
End: 2025-08-01

## 2025-08-02 LAB
BASOPHILS # BLD AUTO: 71 CELLS/UL (ref 0–200)
BASOPHILS NFR BLD AUTO: 1.2 %
CHOLEST SERPL-MCNC: 203 MG/DL
CHOLEST/HDLC SERPL: 3.4 (CALC)
EOSINOPHIL # BLD AUTO: 260 CELLS/UL (ref 15–500)
EOSINOPHIL NFR BLD AUTO: 4.4 %
ERYTHROCYTE [DISTWIDTH] IN BLOOD BY AUTOMATED COUNT: 12.6 % (ref 11–15)
HCT VFR BLD AUTO: 41.9 % (ref 35–45)
HDLC SERPL-MCNC: 60 MG/DL
HGB BLD-MCNC: 13.9 G/DL (ref 11.7–15.5)
LDLC SERPL CALC-MCNC: 124 MG/DL (CALC)
LYMPHOCYTES # BLD AUTO: 1670 CELLS/UL (ref 850–3900)
LYMPHOCYTES NFR BLD AUTO: 28.3 %
MCH RBC QN AUTO: 30.3 PG (ref 27–33)
MCHC RBC AUTO-ENTMCNC: 33.2 G/DL (ref 32–36)
MCV RBC AUTO: 91.5 FL (ref 80–100)
MONOCYTES # BLD AUTO: 596 CELLS/UL (ref 200–950)
MONOCYTES NFR BLD AUTO: 10.1 %
NEUTROPHILS # BLD AUTO: 3304 CELLS/UL (ref 1500–7800)
NEUTROPHILS NFR BLD AUTO: 56 %
NONHDLC SERPL-MCNC: 143 MG/DL (CALC)
PLATELET # BLD AUTO: 200 THOUSAND/UL (ref 140–400)
PMV BLD REES-ECKER: 11.4 FL (ref 7.5–12.5)
RBC # BLD AUTO: 4.58 MILLION/UL (ref 3.8–5.1)
TRIGL SERPL-MCNC: 89 MG/DL
WBC # BLD AUTO: 5.9 THOUSAND/UL (ref 3.8–10.8)

## 2025-08-06 ENCOUNTER — OFFICE VISIT (OUTPATIENT)
Dept: FAMILY MEDICINE CLINIC | Facility: CLINIC | Age: 60
End: 2025-08-06
Payer: COMMERCIAL

## 2025-08-06 VITALS
BODY MASS INDEX: 28.61 KG/M2 | TEMPERATURE: 98.5 F | HEIGHT: 66 IN | HEART RATE: 62 BPM | OXYGEN SATURATION: 94 % | WEIGHT: 178 LBS | DIASTOLIC BLOOD PRESSURE: 70 MMHG | SYSTOLIC BLOOD PRESSURE: 90 MMHG | RESPIRATION RATE: 16 BRPM

## 2025-08-06 DIAGNOSIS — Z13.1 SCREENING FOR DIABETES MELLITUS: ICD-10-CM

## 2025-08-06 DIAGNOSIS — E03.9 ACQUIRED HYPOTHYROIDISM: ICD-10-CM

## 2025-08-06 DIAGNOSIS — R00.2 PALPITATIONS: ICD-10-CM

## 2025-08-06 DIAGNOSIS — Z00.00 ANNUAL PHYSICAL EXAM: Primary | ICD-10-CM

## 2025-08-06 DIAGNOSIS — Z13.220 SCREENING FOR LIPID DISORDERS: ICD-10-CM

## 2025-08-06 PROCEDURE — 99396 PREV VISIT EST AGE 40-64: CPT | Performed by: NURSE PRACTITIONER

## 2025-08-06 PROCEDURE — 99214 OFFICE O/P EST MOD 30 MIN: CPT | Performed by: NURSE PRACTITIONER

## 2025-08-08 ENCOUNTER — TELEPHONE (OUTPATIENT)
Age: 60
End: 2025-08-08

## 2025-08-08 DIAGNOSIS — R00.2 PALPITATIONS: Primary | ICD-10-CM

## (undated) DEVICE — 3M™ STERI-STRIP™ COMPOUND BENZOIN TINCTURE 40 BAGS/CARTON 4 CARTONS/CASE C1544: Brand: 3M™ STERI-STRIP™

## (undated) DEVICE — CHLORAPREP HI-LITE 26ML ORANGE

## (undated) DEVICE — SUT MONOCRYL 4-0 PS-2 27 IN Y426H

## (undated) DEVICE — MEDI-VAC YANKAUER SUCTION HANDLE W/BULBOUS AND CONTROL VENT: Brand: CARDINAL HEALTH

## (undated) DEVICE — GAUZE SPONGES,16 PLY: Brand: CURITY

## (undated) DEVICE — 2000CC GUARDIAN II: Brand: GUARDIAN

## (undated) DEVICE — NEEDLE 25G X 1 1/2

## (undated) DEVICE — TUBING SUCTION 5MM X 12 FT

## (undated) DEVICE — DRAPE PROBE NEO-PROBE/ULTRASOUND

## (undated) DEVICE — LIGACLIP MCA MULTIPLE CLIP APPLIERS, 20 MEDIUM CLIPS: Brand: LIGACLIP

## (undated) DEVICE — SMOKE EVAC BOVIE PENCIL BUTTON

## (undated) DEVICE — ADHESIVE SKN CLSR HISTOACRYL FLEX 0.5ML LF

## (undated) DEVICE — SUT SILK 2-0 SH 30 IN K833H

## (undated) DEVICE — GLOVE SRG BIOGEL 6

## (undated) DEVICE — SUPER SPONGES,MEDIUM: Brand: KERLIX

## (undated) DEVICE — SCD SEQUENTIAL COMPRESSION COMFORT SLEEVE MEDIUM KNEE LENGTH: Brand: KENDALL SCD

## (undated) DEVICE — SUT MONOCRYL 3-0 SH 27 IN Y416H

## (undated) DEVICE — STRL UNIVERSAL MINOR GENERAL: Brand: CARDINAL HEALTH

## (undated) DEVICE — 3M™ STERI-STRIP™ REINFORCED ADHESIVE SKIN CLOSURES, R1547, 1/2 IN X 4 IN (12 MM X 100 MM), 6 STRIPS/ENVELOPE: Brand: 3M™ STERI-STRIP™

## (undated) DEVICE — INTENDED FOR TISSUE SEPARATION, AND OTHER PROCEDURES THAT REQUIRE A SHARP SURGICAL BLADE TO PUNCTURE OR CUT.: Brand: BARD-PARKER SAFETY BLADES SIZE 15, STERILE